# Patient Record
Sex: FEMALE | Race: WHITE | NOT HISPANIC OR LATINO | Employment: STUDENT | URBAN - METROPOLITAN AREA
[De-identification: names, ages, dates, MRNs, and addresses within clinical notes are randomized per-mention and may not be internally consistent; named-entity substitution may affect disease eponyms.]

---
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
## 2022-02-21 ENCOUNTER — TELEPHONE (OUTPATIENT)
Dept: PULMONOLOGY | Facility: CLINIC | Age: 22
End: 2022-02-21

## 2022-02-21 NOTE — TELEPHONE ENCOUNTER
Patient is calling, she said she started using the lower dosage Advair inhaler first to try and see if that would work well for her  She was still struggling so she tried the higher dosage to see if that would help her shortness of breath or asthma attacks  Neither of the doses really helped her and she is still experiencing SOB and asthma attacks  She isn't sure what to do   Please advise

## 2022-02-21 NOTE — TELEPHONE ENCOUNTER
Called patient  Started on prednisone taper    She will call to schedule follow up with Dr Wilton Perea Length To Time In Minutes Device Was In Place: 10

## 2022-03-21 ENCOUNTER — TELEPHONE (OUTPATIENT)
Dept: PULMONOLOGY | Facility: CLINIC | Age: 22
End: 2022-03-21

## 2022-03-22 ENCOUNTER — OFFICE VISIT (OUTPATIENT)
Dept: PULMONOLOGY | Facility: CLINIC | Age: 22
End: 2022-03-22
Payer: COMMERCIAL

## 2022-03-22 VITALS
WEIGHT: 220 LBS | SYSTOLIC BLOOD PRESSURE: 118 MMHG | HEIGHT: 69 IN | DIASTOLIC BLOOD PRESSURE: 76 MMHG | OXYGEN SATURATION: 99 % | BODY MASS INDEX: 32.58 KG/M2 | TEMPERATURE: 97.7 F | RESPIRATION RATE: 18 BRPM | HEART RATE: 106 BPM

## 2022-03-22 DIAGNOSIS — J45.40 MODERATE PERSISTENT ASTHMA, UNSPECIFIED WHETHER COMPLICATED: Primary | ICD-10-CM

## 2022-03-22 DIAGNOSIS — T78.40XD ALLERGY, SUBSEQUENT ENCOUNTER: ICD-10-CM

## 2022-03-22 DIAGNOSIS — J30.89 NON-SEASONAL ALLERGIC RHINITIS, UNSPECIFIED TRIGGER: ICD-10-CM

## 2022-03-22 PROCEDURE — 99214 OFFICE O/P EST MOD 30 MIN: CPT | Performed by: INTERNAL MEDICINE

## 2022-03-22 RX ORDER — BUDESONIDE AND FORMOTEROL FUMARATE DIHYDRATE 160; 4.5 UG/1; UG/1
2 AEROSOL RESPIRATORY (INHALATION) 2 TIMES DAILY
Qty: 10.2 G | Refills: 5 | Status: SHIPPED | OUTPATIENT
Start: 2022-03-22 | End: 2022-03-23

## 2022-03-22 RX ORDER — BUDESONIDE AND FORMOTEROL FUMARATE DIHYDRATE 160; 4.5 UG/1; UG/1
2 AEROSOL RESPIRATORY (INHALATION) 2 TIMES DAILY
Qty: 10.2 G | Refills: 5 | Status: SHIPPED | OUTPATIENT
Start: 2022-03-22 | End: 2022-03-22

## 2022-03-22 RX ORDER — FLUTICASONE PROPIONATE 50 MCG
1 SPRAY, SUSPENSION (ML) NASAL DAILY
Qty: 15.8 ML | Refills: 5 | Status: SHIPPED | OUTPATIENT
Start: 2022-03-22

## 2022-03-22 NOTE — PROGRESS NOTES
Office Progress Note - Pulmonary    Reid Saavedra 25 y o  female MRN: [id number]    Encounter: [id number]      Assessment:   Bronchial asthma   Allergic rhinitis  Plan:     Symbicort 160/4 5, 2 inhalations twice a day   Albuterol rescue inhaler 2 inhalations 4 times a day as needed   Fluticasone nasal spray 2 sprays to each nostril once a day   Benadryl once a day as needed per   Follow-up in 6 months per    Discussion:   The patient has bronchial asthma is better treated with the higher dose of Advair inhaler  Unfortunately her insurance company does not cover Advair and Symbicort is the preferred agent  I have discontinue the Advair and started her on Symbicort 160/4 5, 2 inhalations twice a day  She will use albuterol rescue inhaler 2 inhalations 4 times a day as needed  I have continued her on the fluticasone nasal spray 2 sprays to each nostril once a day  I have renewed her prescription  Because of the asthma and the allergic rhinitis the patient needs to have an air condition in her dorm  I have filled the form to help her moved to a different dorm that is air conditioned  I will see her in 6 months in a follow-up visit  Subjective: The patient is here for a follow-up visit  She tried the lower dose of Advair however she started having more symptoms  She resume the higher dose of the Advair  With that she did not need to use the rescue inhaler  Unfortunately her insurance company does not cover Advair and she has to switch to Symbicort  She denies nocturnal symptoms  Her postnasal dripping is well controlled  She is using fluticasone nasal spray 2 sprays to each nostril once a day  Review of systems:  A 12 point system review is done and aside from what is stated above the rest of the review of systems is negative  Family history and social history are reviewed  Medications list is reviewed        Vitals: Blood pressure 118/76, pulse (!) 106, temperature 97 7 °F (36 5 °C), resp  rate 18, height 5' 9" (1 753 m), weight 99 8 kg (220 lb), SpO2 99 %  ,     Physical Exam  Gen: Awake, alert, oriented x 3, no acute distress  HEENT: Mucous membranes moist, no oral lesions, no thrush  NECK: No accessory muscle use, JVP not elevated  Cardiac: Regular, single S1, single S2, no murmurs, no rubs, no gallops  Lungs:  Clear breath sounds  No wheezing or rhonchi  Abdomen: normoactive bowel sounds, soft nontender, nondistended, no rebound or rigidity, no guarding  Extremities: no cyanosis, no clubbing, no edema  Neuro:  Grossly nonfocal   Skin:  No rash

## 2022-03-23 DIAGNOSIS — J45.40 MODERATE PERSISTENT ASTHMA WITHOUT COMPLICATION: Primary | ICD-10-CM

## 2022-03-23 RX ORDER — BUDESONIDE AND FORMOTEROL FUMARATE DIHYDRATE 160; 4.5 UG/1; UG/1
2 AEROSOL RESPIRATORY (INHALATION) 2 TIMES DAILY
Qty: 10.2 G | Refills: 5 | Status: SHIPPED | OUTPATIENT
Start: 2022-03-23 | End: 2022-06-03 | Stop reason: SDUPTHER

## 2022-03-28 ENCOUNTER — TELEPHONE (OUTPATIENT)
Dept: OTHER | Facility: OTHER | Age: 22
End: 2022-03-28

## 2022-03-28 ENCOUNTER — APPOINTMENT (EMERGENCY)
Dept: RADIOLOGY | Facility: HOSPITAL | Age: 22
End: 2022-03-28
Payer: COMMERCIAL

## 2022-03-28 ENCOUNTER — HOSPITAL ENCOUNTER (EMERGENCY)
Facility: HOSPITAL | Age: 22
Discharge: HOME | End: 2022-03-28
Attending: EMERGENCY MEDICINE
Payer: COMMERCIAL

## 2022-03-28 VITALS
HEART RATE: 76 BPM | RESPIRATION RATE: 17 BRPM | SYSTOLIC BLOOD PRESSURE: 145 MMHG | TEMPERATURE: 98.7 F | OXYGEN SATURATION: 100 % | DIASTOLIC BLOOD PRESSURE: 79 MMHG

## 2022-03-28 DIAGNOSIS — R10.13 EPIGASTRIC PAIN: ICD-10-CM

## 2022-03-28 DIAGNOSIS — R10.9 LEFT SIDED ABDOMINAL PAIN: ICD-10-CM

## 2022-03-28 DIAGNOSIS — R10.9 LEFT FLANK PAIN: Primary | ICD-10-CM

## 2022-03-28 LAB
ALBUMIN SERPL BCP-MCNC: 4 G/DL (ref 3.5–5)
ALP SERPL-CCNC: 91 U/L (ref 46–384)
ALT SERPL W P-5'-P-CCNC: 14 U/L (ref 12–78)
ANION GAP SERPL CALCULATED.3IONS-SCNC: 3 MMOL/L (ref 4–13)
AST SERPL W P-5'-P-CCNC: 12 U/L (ref 5–45)
BASOPHILS # BLD AUTO: 0.07 THOUSANDS/ΜL (ref 0–0.1)
BASOPHILS NFR BLD AUTO: 1 % (ref 0–1)
BILIRUB SERPL-MCNC: 0.24 MG/DL (ref 0.2–1)
BILIRUB UR QL STRIP: NEGATIVE
BUN SERPL-MCNC: 8 MG/DL (ref 5–25)
CALCIUM SERPL-MCNC: 9.6 MG/DL (ref 8.3–10.1)
CHLORIDE SERPL-SCNC: 108 MMOL/L (ref 100–108)
CLARITY UR: CLEAR
CO2 SERPL-SCNC: 29 MMOL/L (ref 21–32)
COLOR UR: YELLOW
CREAT SERPL-MCNC: 0.64 MG/DL (ref 0.6–1.3)
EOSINOPHIL # BLD AUTO: 0.28 THOUSAND/ΜL (ref 0–0.61)
EOSINOPHIL NFR BLD AUTO: 3 % (ref 0–6)
ERYTHROCYTE [DISTWIDTH] IN BLOOD BY AUTOMATED COUNT: 12.7 % (ref 11.6–15.1)
EXT PREG TEST URINE: NEGATIVE
EXT. CONTROL ED NAV: NORMAL
GFR SERPL CREATININE-BSD FRML MDRD: 129 ML/MIN/1.73SQ M
GLUCOSE SERPL-MCNC: 96 MG/DL (ref 65–140)
GLUCOSE UR STRIP-MCNC: NEGATIVE MG/DL
HCT VFR BLD AUTO: 40.9 % (ref 34.8–46.1)
HGB BLD-MCNC: 13.2 G/DL (ref 11.5–15.4)
HGB UR QL STRIP.AUTO: NEGATIVE
IMM GRANULOCYTES # BLD AUTO: 0.06 THOUSAND/UL (ref 0–0.2)
IMM GRANULOCYTES NFR BLD AUTO: 1 % (ref 0–2)
KETONES UR STRIP-MCNC: NEGATIVE MG/DL
LEUKOCYTE ESTERASE UR QL STRIP: NEGATIVE
LIPASE SERPL-CCNC: 117 U/L (ref 73–393)
LYMPHOCYTES # BLD AUTO: 1.9 THOUSANDS/ΜL (ref 0.6–4.47)
LYMPHOCYTES NFR BLD AUTO: 19 % (ref 14–44)
MCH RBC QN AUTO: 29.5 PG (ref 26.8–34.3)
MCHC RBC AUTO-ENTMCNC: 32.3 G/DL (ref 31.4–37.4)
MCV RBC AUTO: 91 FL (ref 82–98)
MONOCYTES # BLD AUTO: 0.61 THOUSAND/ΜL (ref 0.17–1.22)
MONOCYTES NFR BLD AUTO: 6 % (ref 4–12)
NEUTROPHILS # BLD AUTO: 7.06 THOUSANDS/ΜL (ref 1.85–7.62)
NEUTS SEG NFR BLD AUTO: 70 % (ref 43–75)
NITRITE UR QL STRIP: NEGATIVE
NRBC BLD AUTO-RTO: 0 /100 WBCS
PH UR STRIP.AUTO: 7.5 [PH] (ref 4.5–8)
PLATELET # BLD AUTO: 352 THOUSANDS/UL (ref 149–390)
PMV BLD AUTO: 10.3 FL (ref 8.9–12.7)
POTASSIUM SERPL-SCNC: 3.8 MMOL/L (ref 3.5–5.3)
PROT SERPL-MCNC: 7.8 G/DL (ref 6.4–8.2)
PROT UR STRIP-MCNC: NEGATIVE MG/DL
RBC # BLD AUTO: 4.48 MILLION/UL (ref 3.81–5.12)
SODIUM SERPL-SCNC: 140 MMOL/L (ref 136–145)
SP GR UR STRIP.AUTO: 1.02 (ref 1–1.03)
UROBILINOGEN UR QL STRIP.AUTO: 0.2 E.U./DL
WBC # BLD AUTO: 9.98 THOUSAND/UL (ref 4.31–10.16)

## 2022-03-28 PROCEDURE — 81025 URINE PREGNANCY TEST: CPT | Performed by: PHYSICIAN ASSISTANT

## 2022-03-28 PROCEDURE — 81003 URINALYSIS AUTO W/O SCOPE: CPT

## 2022-03-28 PROCEDURE — 99285 EMERGENCY DEPT VISIT HI MDM: CPT | Performed by: PHYSICIAN ASSISTANT

## 2022-03-28 PROCEDURE — 96374 THER/PROPH/DIAG INJ IV PUSH: CPT

## 2022-03-28 PROCEDURE — 85025 COMPLETE CBC W/AUTO DIFF WBC: CPT | Performed by: PHYSICIAN ASSISTANT

## 2022-03-28 PROCEDURE — 36415 COLL VENOUS BLD VENIPUNCTURE: CPT | Performed by: PHYSICIAN ASSISTANT

## 2022-03-28 PROCEDURE — 83690 ASSAY OF LIPASE: CPT | Performed by: PHYSICIAN ASSISTANT

## 2022-03-28 PROCEDURE — 96376 TX/PRO/DX INJ SAME DRUG ADON: CPT

## 2022-03-28 PROCEDURE — 76856 US EXAM PELVIC COMPLETE: CPT

## 2022-03-28 PROCEDURE — 96361 HYDRATE IV INFUSION ADD-ON: CPT

## 2022-03-28 PROCEDURE — 80053 COMPREHEN METABOLIC PANEL: CPT | Performed by: PHYSICIAN ASSISTANT

## 2022-03-28 PROCEDURE — G1004 CDSM NDSC: HCPCS

## 2022-03-28 PROCEDURE — 99284 EMERGENCY DEPT VISIT MOD MDM: CPT

## 2022-03-28 PROCEDURE — 76830 TRANSVAGINAL US NON-OB: CPT

## 2022-03-28 PROCEDURE — 96375 TX/PRO/DX INJ NEW DRUG ADDON: CPT

## 2022-03-28 PROCEDURE — 74177 CT ABD & PELVIS W/CONTRAST: CPT

## 2022-03-28 RX ORDER — KETOROLAC TROMETHAMINE 30 MG/ML
15 INJECTION, SOLUTION INTRAMUSCULAR; INTRAVENOUS ONCE
Status: COMPLETED | OUTPATIENT
Start: 2022-03-28 | End: 2022-03-28

## 2022-03-28 RX ORDER — ONDANSETRON 2 MG/ML
4 INJECTION INTRAMUSCULAR; INTRAVENOUS ONCE
Status: COMPLETED | OUTPATIENT
Start: 2022-03-28 | End: 2022-03-28

## 2022-03-28 RX ADMIN — ONDANSETRON 4 MG: 2 INJECTION INTRAMUSCULAR; INTRAVENOUS at 16:49

## 2022-03-28 RX ADMIN — ONDANSETRON 4 MG: 2 INJECTION INTRAMUSCULAR; INTRAVENOUS at 15:08

## 2022-03-28 RX ADMIN — SODIUM CHLORIDE 1000 ML: 0.9 INJECTION, SOLUTION INTRAVENOUS at 15:08

## 2022-03-28 RX ADMIN — IOHEXOL 100 ML: 350 INJECTION, SOLUTION INTRAVENOUS at 16:59

## 2022-03-28 RX ADMIN — MORPHINE SULFATE 2 MG: 2 INJECTION, SOLUTION INTRAMUSCULAR; INTRAVENOUS at 16:49

## 2022-03-28 RX ADMIN — KETOROLAC TROMETHAMINE 15 MG: 30 INJECTION, SOLUTION INTRAMUSCULAR at 15:08

## 2022-03-28 NOTE — Clinical Note
Esvin Grya was seen and treated in our emergency department on 3/28/2022  Diagnosis:     Denisse    She may return on this date:     Excused on 3/28     If you have any questions or concerns, please don't hesitate to call        Tyrone Florez MD    ______________________________           _______________          _______________  Hospital Representative                              Date                                Time

## 2022-03-28 NOTE — DISCHARGE INSTRUCTIONS
Please follow all return precautions  6 5 x 4 7 x 6 5 cm hemorrhagic left ovarian cyst (O-RADS 2  Based on its size and appearance, by ACR O-RADS criteria, gynecologic referral is suggested)

## 2022-03-28 NOTE — Clinical Note
Elizabeth Chun was seen and treated in our emergency department on 3/28/2022  Diagnosis:     Denisse    She may return on this date:     Excused on 3/28     If you have any questions or concerns, please don't hesitate to call        Chuy Suarez MD    ______________________________           _______________          _______________  Hospital Representative                              Date                                Time

## 2022-03-28 NOTE — ED PROVIDER NOTES
History  Chief Complaint   Patient presents with    Flank Pain     Pt c/o L sided flank pain radiating into groin/abdomen since today  Pt has hx of kidney stones      Patient presents to the ER for evaluation of left flank pain  The patient states that today she began with pain that began in her left flank and radiates around to her back  States that the pain is worse with walking  Patient notes associated nausea and urinary frequency  Patient denies taking any medication for PTA  Patient states that she has a history of kidney stones and this feels similar  Patient states she has a history of an appendectomy  Denies any fevers, dizziness, chest pain, difficulty breathing, vomiting, diarrhea, dysuria, or any other concerning symptoms  Prior to Admission Medications   Prescriptions Last Dose Informant Patient Reported? Taking? Multiple Vitamin (Multi-Day) TABS  Self Yes No   Sig: Take 1 tablet by mouth   Probiotic Product (Misc Intestinal Vikki Regulat) CAPS  Self Yes No   Sig: Take by mouth   Patient not taking: Reported on 2021   Symbicort 160-4 5 MCG/ACT inhaler   No No   Sig: Inhale 2 puffs 2 (two) times a day Rinse mouth after use     albuterol (PROVENTIL HFA,VENTOLIN HFA) 90 mcg/act inhaler  Self Yes No   ascorbic acid (VITAMIN C) 500 MG tablet  Self Yes No   Sig: Take 500 mg by mouth   doxycycline hyclate (VIBRAMYCIN) 100 mg capsule  Self Yes No   Patient not taking: Reported on 2021   fluticasone (FLONASE) 50 mcg/act nasal spray   No No   Si spray into each nostril daily   ibuprofen (MOTRIN) 800 mg tablet   No No   Sig: Take 1 tablet (800 mg total) by mouth every 8 (eight) hours for 5 days   morphine (MSIR) 15 mg tablet  Self No No   Sig: Take 0 5 tablets (7 5 mg total) by mouth every 4 (four) hours as needed for severe painMax Daily Amount: 45 mg   Patient not taking: Reported on 2021   ondansetron (ZOFRAN) 4 mg tablet  Self No No   Sig: Take 1 tablet (4 mg total) by mouth every 6 (six) hours   Patient not taking: Reported on 10/7/2021   predniSONE 10 mg tablet  Self No No   Sig: Take 4 tablets (40 mg total) by mouth daily 40 mg x 3 days, 30 mg x 3 days, 20 mg x 3 days, 10 mg x 3 days   Patient not taking: Reported on 3/22/2022    predniSONE 20 mg tablet  Self Yes No   Patient not taking: Reported on 9/22/2021      Facility-Administered Medications: None       Past Medical History:   Diagnosis Date    Exercise-induced asthma        Past Surgical History:   Procedure Laterality Date    APPENDECTOMY         History reviewed  No pertinent family history  I have reviewed and agree with the history as documented  E-Cigarette/Vaping    E-Cigarette Use Never User      E-Cigarette/Vaping Substances    Nicotine No     THC No     CBD No     Flavoring No     Other No     Unknown No      Social History     Tobacco Use    Smoking status: Never Smoker    Smokeless tobacco: Never Used   Vaping Use    Vaping Use: Never used   Substance Use Topics    Alcohol use: Yes     Comment: social     Drug use: Never       Review of Systems   Constitutional: Negative for fever  HENT: Negative for congestion, rhinorrhea and sore throat  Respiratory: Negative for shortness of breath  Cardiovascular: Negative for chest pain  Gastrointestinal: Positive for abdominal pain and nausea  Negative for diarrhea and vomiting  Genitourinary: Positive for flank pain and frequency  Negative for dysuria  Musculoskeletal: Negative for back pain and neck pain  Skin: Negative for rash  Neurological: Negative for weakness, numbness and headaches  All other systems reviewed and are negative  Physical Exam  Physical Exam  Constitutional:       Appearance: She is well-developed  HENT:      Head: Normocephalic and atraumatic  Nose: Nose normal    Eyes:      Conjunctiva/sclera: Conjunctivae normal    Cardiovascular:      Rate and Rhythm: Normal rate     Pulmonary:      Effort: Pulmonary effort is normal    Abdominal:      Palpations: Abdomen is soft  Tenderness: There is abdominal tenderness  There is no guarding  Comments: TTP of Left flank, LLQ, LUQ and epigastric area   Musculoskeletal:         General: Normal range of motion  Cervical back: Normal range of motion  Skin:     General: Skin is warm  Capillary Refill: Capillary refill takes less than 2 seconds  Neurological:      Mental Status: She is alert and oriented to person, place, and time           Vital Signs  ED Triage Vitals [03/28/22 1436]   Temperature Pulse Respirations Blood Pressure SpO2   98 7 °F (37 1 °C) 76 17 145/79 100 %      Temp Source Heart Rate Source Patient Position - Orthostatic VS BP Location FiO2 (%)   Oral Monitor Lying Right arm --      Pain Score       7           Vitals:    03/28/22 1436   BP: 145/79   Pulse: 76   Patient Position - Orthostatic VS: Lying         Visual Acuity      ED Medications  Medications   sodium chloride 0 9 % bolus 1,000 mL (0 mL Intravenous Stopped 3/28/22 1634)   ondansetron (ZOFRAN) injection 4 mg (4 mg Intravenous Given 3/28/22 1508)   ketorolac (TORADOL) injection 15 mg (15 mg Intravenous Given 3/28/22 1508)   morphine injection 2 mg (2 mg Intravenous Given 3/28/22 1649)   ondansetron (ZOFRAN) injection 4 mg (4 mg Intravenous Given 3/28/22 1649)   iohexol (OMNIPAQUE) 350 MG/ML injection (MULTI-DOSE) 100 mL (100 mL Intravenous Given 3/28/22 1659)       Diagnostic Studies  Results Reviewed     Procedure Component Value Units Date/Time    Lipase [[id number]]  (Normal) Collected: 03/28/22 1507    Lab Status: Final result Specimen: Blood from Arm, Right Updated: 03/28/22 1559     Lipase 117 u/L     Comprehensive metabolic panel [[id number]]  (Abnormal) Collected: 03/28/22 1507    Lab Status: Final result Specimen: Blood from Arm, Right Updated: 03/28/22 1559     Sodium 140 mmol/L      Potassium 3 8 mmol/L      Chloride 108 mmol/L      CO2 29 mmol/L      ANION GAP 3 mmol/L BUN 8 mg/dL      Creatinine 0 64 mg/dL      Glucose 96 mg/dL      Calcium 9 6 mg/dL      AST 12 U/L      ALT 14 U/L      Alkaline Phosphatase 91 U/L      Total Protein 7 8 g/dL      Albumin 4 0 g/dL      Total Bilirubin 0 24 mg/dL      eGFR 129 ml/min/1 73sq m     Narrative:      National Kidney Disease Foundation guidelines for Chronic Kidney Disease (CKD):     Stage 1 with normal or high GFR (GFR > 90 mL/min/1 73 square meters)    Stage 2 Mild CKD (GFR = 60-89 mL/min/1 73 square meters)    Stage 3A Moderate CKD (GFR = 45-59 mL/min/1 73 square meters)    Stage 3B Moderate CKD (GFR = 30-44 mL/min/1 73 square meters)    Stage 4 Severe CKD (GFR = 15-29 mL/min/1 73 square meters)    Stage 5 End Stage CKD (GFR <15 mL/min/1 73 square meters)  Note: GFR calculation is accurate only with a steady state creatinine    POCT pregnancy, urine [[id number]]  (Normal) Resulted: 03/28/22 1544    Lab Status: Final result Updated: 03/28/22 1544     EXT PREG TEST UR (Ref: Negative) negative     Control valid    CBC and differential [[id number]] Collected: 03/28/22 1507    Lab Status: Final result Specimen: Blood from Arm, Right Updated: 03/28/22 1525     WBC 9 98 Thousand/uL      RBC 4 48 Million/uL      Hemoglobin 13 2 g/dL      Hematocrit 40 9 %      MCV 91 fL      MCH 29 5 pg      MCHC 32 3 g/dL      RDW 12 7 %      MPV 10 3 fL      Platelets 442 Thousands/uL      nRBC 0 /100 WBCs      Neutrophils Relative 70 %      Immat GRANS % 1 %      Lymphocytes Relative 19 %      Monocytes Relative 6 %      Eosinophils Relative 3 %      Basophils Relative 1 %      Neutrophils Absolute 7 06 Thousands/µL      Immature Grans Absolute 0 06 Thousand/uL      Lymphocytes Absolute 1 90 Thousands/µL      Monocytes Absolute 0 61 Thousand/µL      Eosinophils Absolute 0 28 Thousand/µL      Basophils Absolute 0 07 Thousands/µL     Urine Macroscopic, POC [[id number]] Collected: 03/28/22 1514    Lab Status: Final result Specimen: Urine Updated: 03/28/22 1515     Color, UA Yellow     Clarity, UA Clear     pH, UA 7 5     Leukocytes, UA Negative     Nitrite, UA Negative     Protein, UA Negative mg/dl      Glucose, UA Negative mg/dl      Ketones, UA Negative mg/dl      Urobilinogen, UA 0 2 E U /dl      Bilirubin, UA Negative     Blood, UA Negative     Specific Gravity, UA 1 020    Narrative:      CLINITEK RESULT                 US pelvis complete w transvaginal   Final Result by Derrick Medeiros MD (03/28 1856)       6 5 x 4 7 x 6 5 cm hemorrhagic left ovarian cyst (O-RADS 2  Based on its size and appearance, by ACR O-RADS criteria, gynecologic referral is suggested)  No evidence of adnexal torsion  Small amount of free fluid in the pelvis  Otherwise normal pelvic sonogram                       Workstation performed: [id number]         CT abdomen pelvis with contrast   Final Result by Radha Jo MD (03/28 1733)      Incidental 6 8 cm left adnexal cyst   According to current guidelines Aracelis Goo Am Nnamdi Radiol 2020; 30:649-016) in this premenopausal woman, this should be followed up in 6 to 12 months by pelvic ultrasound  The study was marked in [facility] for immediate notification  Workstation performed: [id number]                    Procedures  Procedures         ED Course  ED Course as of 03/29/22 0722   Mon Mar 28, 2022   1446 Blood Pressure: 145/79   1446 Temperature: 98 7 °F (37 1 °C)   1446 Pulse: 76   1446 Respirations: 17   1446 SpO2: 100 %   1516 Leukocytes, UA: Negative   1516 Nitrite, UA: Negative   1516 Blood, UA: Negative   1529 WBC: 9 98   1529 Hemoglobin: 13 2   1559 Creatinine: 0 64   1600 PREGNANCY TEST URINE: negative         CRAFFT      Most Recent Value   SBIRT (13-21 yo)    In order to provide better care to our patients, we are screening all of our patients for alcohol and drug use  Would it be okay to ask you these screening questions?  No Filed at: 03/28/2022 1523                                          MDM Patient well appearing, no acute distress in the ER  Patient signed out to Dr Moni Zimmer, awaiting CT scan results and dispo  Disposition  Final diagnoses:   Left flank pain   Left sided abdominal pain   Epigastric pain     Time reflects when diagnosis was documented in both MDM as applicable and the Disposition within this note     Time User Action Codes Description Comment    3/28/2022  5:39 PM Blinda Duplin Add [R10 9] Left flank pain     3/28/2022  5:39 PM Blinda Duplin Add [R10 9] Left sided abdominal pain     3/28/2022  5:40 PM Blinda Duplin Add [R10 13] Epigastric pain       ED Disposition     ED Disposition Condition Date/Time Comment    Discharge Stable Mon Mar 28, 2022  5:39 PM Ayla Mooreumblani discharge to home/self care              Follow-up Information     Follow up With Specialties Details Why Contact Info Additional Information    Marian Tsai MD Pediatrics Call   [address] [facility]   [address]  [facility] [address] [place] [address], [facility]  Obstetrics and Gynecology Call   [id number] [place] [address], [address] [place] [address], [address] [place] [address], [id number] [phone]          Discharge Medication List as of 3/28/2022  7:15 PM      CONTINUE these medications which have NOT CHANGED    Details   albuterol (PROVENTIL HFA,VENTOLIN HFA) 90 mcg/act inhaler Starting Fri 7/23/2021, Historical Med      ascorbic acid (VITAMIN C) 500 MG tablet Take 500 mg by mouth, Historical Med      doxycycline hyclate (VIBRAMYCIN) 100 mg capsule Starting u 6/24/2021, Historical Med      fluticasone (FLONASE) 50 mcg/act nasal spray 1 spray into each nostril daily, Starting Tue 3/22/2022, Normal      ibuprofen (MOTRIN) 800 mg tablet Take 1 tablet (800 mg total) by mouth every 8 (eight) hours for 5 days, Starting Tue 5/18/2021, Until Sun 5/23/2021, Normal morphine (MSIR) 15 mg tablet Take 0 5 tablets (7 5 mg total) by mouth every 4 (four) hours as needed for severe painMax Daily Amount: 45 mg, Starting Sun 4/18/2021, Normal      Multiple Vitamin (Multi-Day) TABS Take 1 tablet by mouth, Historical Med      ondansetron (ZOFRAN) 4 mg tablet Take 1 tablet (4 mg total) by mouth every 6 (six) hours, Starting Sun 4/18/2021, Normal      !! predniSONE 10 mg tablet Take 4 tablets (40 mg total) by mouth daily 40 mg x 3 days, 30 mg x 3 days, 20 mg x 3 days, 10 mg x 3 days, Starting Mon 2/21/2022, Normal      !! predniSONE 20 mg tablet Starting Fri 7/23/2021, Historical Med      Probiotic Product (Misc Intestinal Vikki Regulat) CAPS Take by mouth, Historical Med      Symbicort 160-4 5 MCG/ACT inhaler Inhale 2 puffs 2 (two) times a day Rinse mouth after use , Starting Wed 3/23/2022, Normal       !! - Potential duplicate medications found  Please discuss with provider  No discharge procedures on file      PDMP Review     None          ED Provider  Electronically Signed by           Oscar Avila PA-C  03/29/22 1108

## 2022-03-28 NOTE — Clinical Note
Naeem Kan was seen and treated in our emergency department on 3/28/2022  Diagnosis:     Denisse    She may return on this date:     Excused on 3/28     If you have any questions or concerns, please don't hesitate to call        Marlena Mccoy MD    ______________________________           _______________          _______________  Hospital Representative                              Date                                Time

## 2022-03-28 NOTE — TELEPHONE ENCOUNTER
[employer]  She is going to be sending her over to the hospital for flank pain and possible kidney stones  She just wanted her urologist to be aware  If you need to speak with her della call her back at the number listed

## 2022-03-31 ENCOUNTER — HOSPITAL ENCOUNTER (EMERGENCY)
Facility: HOSPITAL | Age: 22
Discharge: HOME | End: 2022-03-31
Attending: EMERGENCY MEDICINE
Payer: COMMERCIAL

## 2022-03-31 VITALS
OXYGEN SATURATION: 99 % | BODY MASS INDEX: 34.5 KG/M2 | TEMPERATURE: 98.3 F | HEART RATE: 95 BPM | DIASTOLIC BLOOD PRESSURE: 79 MMHG | WEIGHT: 233.6 LBS | RESPIRATION RATE: 18 BRPM | SYSTOLIC BLOOD PRESSURE: 141 MMHG

## 2022-03-31 DIAGNOSIS — R10.9 ABDOMINAL PAIN: Primary | ICD-10-CM

## 2022-03-31 DIAGNOSIS — R11.0 NAUSEA: ICD-10-CM

## 2022-03-31 DIAGNOSIS — R19.7 DIARRHEA: ICD-10-CM

## 2022-03-31 DIAGNOSIS — N83.209 OVARIAN CYST: ICD-10-CM

## 2022-03-31 LAB
ALBUMIN SERPL BCP-MCNC: 3.6 G/DL (ref 3.5–5)
ALP SERPL-CCNC: 81 U/L (ref 46–384)
ALT SERPL W P-5'-P-CCNC: 13 U/L (ref 12–78)
ANION GAP SERPL CALCULATED.3IONS-SCNC: 4 MMOL/L (ref 4–13)
AST SERPL W P-5'-P-CCNC: 14 U/L (ref 5–45)
BASOPHILS # BLD AUTO: 0.08 THOUSANDS/ΜL (ref 0–0.1)
BASOPHILS NFR BLD AUTO: 1 % (ref 0–1)
BILIRUB SERPL-MCNC: 0.23 MG/DL (ref 0.2–1)
BILIRUB UR QL STRIP: NEGATIVE
BUN SERPL-MCNC: 9 MG/DL (ref 5–25)
CALCIUM SERPL-MCNC: 9.2 MG/DL (ref 8.3–10.1)
CHLORIDE SERPL-SCNC: 108 MMOL/L (ref 100–108)
CLARITY UR: CLEAR
CO2 SERPL-SCNC: 28 MMOL/L (ref 21–32)
COLOR UR: NORMAL
CREAT SERPL-MCNC: 0.69 MG/DL (ref 0.6–1.3)
EOSINOPHIL # BLD AUTO: 0.3 THOUSAND/ΜL (ref 0–0.61)
EOSINOPHIL NFR BLD AUTO: 3 % (ref 0–6)
ERYTHROCYTE [DISTWIDTH] IN BLOOD BY AUTOMATED COUNT: 12.4 % (ref 11.6–15.1)
EXT PREG TEST URINE: NEGATIVE
EXT. CONTROL ED NAV: NORMAL
GFR SERPL CREATININE-BSD FRML MDRD: 126 ML/MIN/1.73SQ M
GLUCOSE SERPL-MCNC: 74 MG/DL (ref 65–140)
GLUCOSE UR STRIP-MCNC: NEGATIVE MG/DL
HCT VFR BLD AUTO: 41.5 % (ref 34.8–46.1)
HGB BLD-MCNC: 13.7 G/DL (ref 11.5–15.4)
HGB UR QL STRIP.AUTO: NEGATIVE
IMM GRANULOCYTES # BLD AUTO: 0.05 THOUSAND/UL (ref 0–0.2)
IMM GRANULOCYTES NFR BLD AUTO: 1 % (ref 0–2)
KETONES UR STRIP-MCNC: NEGATIVE MG/DL
LEUKOCYTE ESTERASE UR QL STRIP: NEGATIVE
LIPASE SERPL-CCNC: 106 U/L (ref 73–393)
LYMPHOCYTES # BLD AUTO: 1.74 THOUSANDS/ΜL (ref 0.6–4.47)
LYMPHOCYTES NFR BLD AUTO: 16 % (ref 14–44)
MCH RBC QN AUTO: 30.2 PG (ref 26.8–34.3)
MCHC RBC AUTO-ENTMCNC: 33 G/DL (ref 31.4–37.4)
MCV RBC AUTO: 92 FL (ref 82–98)
MONOCYTES # BLD AUTO: 0.79 THOUSAND/ΜL (ref 0.17–1.22)
MONOCYTES NFR BLD AUTO: 7 % (ref 4–12)
NEUTROPHILS # BLD AUTO: 7.83 THOUSANDS/ΜL (ref 1.85–7.62)
NEUTS SEG NFR BLD AUTO: 72 % (ref 43–75)
NITRITE UR QL STRIP: NEGATIVE
NRBC BLD AUTO-RTO: 0 /100 WBCS
PH UR STRIP.AUTO: 7.5 [PH]
PLATELET # BLD AUTO: 366 THOUSANDS/UL (ref 149–390)
PMV BLD AUTO: 10.2 FL (ref 8.9–12.7)
POTASSIUM SERPL-SCNC: 3.5 MMOL/L (ref 3.5–5.3)
PROT SERPL-MCNC: 7.3 G/DL (ref 6.4–8.2)
PROT UR STRIP-MCNC: NEGATIVE MG/DL
RBC # BLD AUTO: 4.53 MILLION/UL (ref 3.81–5.12)
SODIUM SERPL-SCNC: 140 MMOL/L (ref 136–145)
SP GR UR STRIP.AUTO: 1.01 (ref 1–1.03)
UROBILINOGEN UR STRIP-ACNC: <2 MG/DL
WBC # BLD AUTO: 10.79 THOUSAND/UL (ref 4.31–10.16)

## 2022-03-31 PROCEDURE — 85025 COMPLETE CBC W/AUTO DIFF WBC: CPT | Performed by: PHYSICIAN ASSISTANT

## 2022-03-31 PROCEDURE — 99284 EMERGENCY DEPT VISIT MOD MDM: CPT | Performed by: PHYSICIAN ASSISTANT

## 2022-03-31 PROCEDURE — 96361 HYDRATE IV INFUSION ADD-ON: CPT

## 2022-03-31 PROCEDURE — 83690 ASSAY OF LIPASE: CPT | Performed by: PHYSICIAN ASSISTANT

## 2022-03-31 PROCEDURE — 99284 EMERGENCY DEPT VISIT MOD MDM: CPT

## 2022-03-31 PROCEDURE — 80053 COMPREHEN METABOLIC PANEL: CPT | Performed by: PHYSICIAN ASSISTANT

## 2022-03-31 PROCEDURE — 81025 URINE PREGNANCY TEST: CPT | Performed by: PHYSICIAN ASSISTANT

## 2022-03-31 PROCEDURE — 96375 TX/PRO/DX INJ NEW DRUG ADDON: CPT

## 2022-03-31 PROCEDURE — 96374 THER/PROPH/DIAG INJ IV PUSH: CPT

## 2022-03-31 PROCEDURE — 81003 URINALYSIS AUTO W/O SCOPE: CPT | Performed by: PHYSICIAN ASSISTANT

## 2022-03-31 PROCEDURE — 36415 COLL VENOUS BLD VENIPUNCTURE: CPT | Performed by: PHYSICIAN ASSISTANT

## 2022-03-31 RX ORDER — KETOROLAC TROMETHAMINE 30 MG/ML
15 INJECTION, SOLUTION INTRAMUSCULAR; INTRAVENOUS ONCE
Status: COMPLETED | OUTPATIENT
Start: 2022-03-31 | End: 2022-03-31

## 2022-03-31 RX ORDER — ONDANSETRON 2 MG/ML
4 INJECTION INTRAMUSCULAR; INTRAVENOUS ONCE
Status: COMPLETED | OUTPATIENT
Start: 2022-03-31 | End: 2022-03-31

## 2022-03-31 RX ORDER — ONDANSETRON 4 MG/1
4 TABLET, ORALLY DISINTEGRATING ORAL EVERY 6 HOURS PRN
Qty: 10 TABLET | Refills: 0 | Status: SHIPPED | OUTPATIENT
Start: 2022-03-31 | End: 2022-07-06 | Stop reason: ALTCHOICE

## 2022-03-31 RX ORDER — DICYCLOMINE HCL 20 MG
20 TABLET ORAL 2 TIMES DAILY
Qty: 6 TABLET | Refills: 0 | Status: SHIPPED | OUTPATIENT
Start: 2022-03-31 | End: 2022-07-06 | Stop reason: ALTCHOICE

## 2022-03-31 RX ORDER — DICYCLOMINE HCL 20 MG
20 TABLET ORAL ONCE
Status: COMPLETED | OUTPATIENT
Start: 2022-03-31 | End: 2022-03-31

## 2022-03-31 RX ADMIN — SODIUM CHLORIDE 1000 ML: 0.9 INJECTION, SOLUTION INTRAVENOUS at 12:40

## 2022-03-31 RX ADMIN — KETOROLAC TROMETHAMINE 15 MG: 30 INJECTION, SOLUTION INTRAMUSCULAR at 12:51

## 2022-03-31 RX ADMIN — ONDANSETRON 4 MG: 2 INJECTION INTRAMUSCULAR; INTRAVENOUS at 12:39

## 2022-03-31 RX ADMIN — DICYCLOMINE HYDROCHLORIDE 20 MG: 20 TABLET ORAL at 12:40

## 2022-03-31 NOTE — Clinical Note
Delfina Victor was seen and treated in our emergency department on 3/31/2022  Diagnosis: abdominal pain    Denisse    She may return on this date: 04/04/2022         If you have any questions or concerns, please don't hesitate to call        Kevan Knapp PA-C    ______________________________           _______________          _______________  Hospital Representative                              Date                                Time

## 2022-03-31 NOTE — ED PROVIDER NOTES
History  Chief Complaint   Patient presents with    Abdominal Pain     Pt seen monday for the same diagnosed with ovarian cysts, c/o increasing pain     This is a 82-year-old female patient who was seen here 4 days ago  At that time she had left lower quadrant pain had a CT scan was found have a 6 cm left incidental ovarian cyst   An ultrasound was then performed there was no torsion and the cyst was once again seen  Was taken Motrin every 6 hours and was helping now has developed diarrhea that is nonbloody she now has pain more over left upper sometimes in her lower  Sometimes in the right upper  She is currently nauseous  States she is not eating as normal   But she is nontoxic in no acute distress denies any fever chills headache blurred vision double vision cough congestion sore throat no chest pain or shortness of breath  No urgency frequency dysuria  Differential diagnosis not limited to viral gastroenteritis, cholecystitis less likely, gastritis less likely, pancreatitis  Ongoing cyst discomfort however not as intense as previous  Patient is under lot of stress from school could be also irritable bowel  Based on the factors no real pain to palpation no guarding or rebound and she just had a CT scan ultrasound will not reimage  I will recheck blood work give Bentyl Zofran fluids and re-evaluate  Prior to Admission Medications   Prescriptions Last Dose Informant Patient Reported? Taking? Multiple Vitamin (Multi-Day) TABS  Self Yes No   Sig: Take 1 tablet by mouth   Probiotic Product (Misc Intestinal Vikki Regulat) CAPS  Self Yes No   Sig: Take by mouth   Patient not taking: Reported on 9/22/2021   Symbicort 160-4 5 MCG/ACT inhaler   No No   Sig: Inhale 2 puffs 2 (two) times a day Rinse mouth after use     albuterol (PROVENTIL HFA,VENTOLIN HFA) 90 mcg/act inhaler  Self Yes No   ascorbic acid (VITAMIN C) 500 MG tablet  Self Yes No   Sig: Take 500 mg by mouth   doxycycline hyclate (VIBRAMYCIN) 100 mg capsule  Self Yes No   Patient not taking: Reported on 2021   fluticasone (FLONASE) 50 mcg/act nasal spray   No No   Si spray into each nostril daily   ibuprofen (MOTRIN) 800 mg tablet   No No   Sig: Take 1 tablet (800 mg total) by mouth every 8 (eight) hours for 5 days   morphine (MSIR) 15 mg tablet  Self No No   Sig: Take 0 5 tablets (7 5 mg total) by mouth every 4 (four) hours as needed for severe painMax Daily Amount: 45 mg   Patient not taking: Reported on 2021   ondansetron (ZOFRAN) 4 mg tablet  Self No No   Sig: Take 1 tablet (4 mg total) by mouth every 6 (six) hours   Patient not taking: Reported on 10/7/2021   predniSONE 10 mg tablet  Self No No   Sig: Take 4 tablets (40 mg total) by mouth daily 40 mg x 3 days, 30 mg x 3 days, 20 mg x 3 days, 10 mg x 3 days   Patient not taking: Reported on 3/22/2022    predniSONE 20 mg tablet  Self Yes No   Patient not taking: Reported on 2021      Facility-Administered Medications: None       Past Medical History:   Diagnosis Date    Exercise-induced asthma        Past Surgical History:   Procedure Laterality Date    APPENDECTOMY         History reviewed  No pertinent family history  I have reviewed and agree with the history as documented  E-Cigarette/Vaping    E-Cigarette Use Never User      E-Cigarette/Vaping Substances    Nicotine No     THC No     CBD No     Flavoring No     Other No     Unknown No      Social History     Tobacco Use    Smoking status: Never Smoker    Smokeless tobacco: Never Used   Vaping Use    Vaping Use: Never used   Substance Use Topics    Alcohol use: Yes     Comment: social     Drug use: Never       Review of Systems   Constitutional: Negative for diaphoresis, fatigue and fever  HENT: Negative for congestion, ear pain, nosebleeds and sore throat  Eyes: Negative for photophobia, pain, discharge and visual disturbance     Respiratory: Negative for cough, choking, chest tightness, shortness of breath and wheezing  Cardiovascular: Negative for chest pain and palpitations  Gastrointestinal: Positive for abdominal distention, diarrhea and nausea  Negative for abdominal pain, anal bleeding, blood in stool, constipation, rectal pain and vomiting  Genitourinary: Negative for dysuria, flank pain and frequency  Musculoskeletal: Negative for back pain, gait problem and joint swelling  Skin: Negative for color change and rash  Neurological: Negative for dizziness, syncope and headaches  Psychiatric/Behavioral: Negative for behavioral problems and confusion  The patient is not nervous/anxious  All other systems reviewed and are negative  Physical Exam  Physical Exam  Vitals and nursing note reviewed  Constitutional:       General: She is not in acute distress  Appearance: Normal appearance  She is well-developed  She is not ill-appearing, toxic-appearing or diaphoretic  HENT:      Head: Normocephalic and atraumatic  Right Ear: Tympanic membrane, ear canal and external ear normal       Left Ear: Tympanic membrane, ear canal and external ear normal       Nose: Nose normal  No congestion or rhinorrhea  Mouth/Throat:      Mouth: Mucous membranes are moist       Pharynx: Oropharynx is clear  No oropharyngeal exudate or posterior oropharyngeal erythema  Eyes:      Extraocular Movements: Extraocular movements intact  Conjunctiva/sclera: Conjunctivae normal       Pupils: Pupils are equal, round, and reactive to light  Cardiovascular:      Rate and Rhythm: Normal rate and regular rhythm  Pulses: Normal pulses  Pulmonary:      Effort: Pulmonary effort is normal  No respiratory distress  Breath sounds: Normal breath sounds  Abdominal:      General: Bowel sounds are normal       Palpations: Abdomen is soft  Tenderness: There is no abdominal tenderness  Musculoskeletal:         General: Normal range of motion        Cervical back: Normal range of motion and neck supple  No rigidity or tenderness  Right lower leg: No edema  Left lower leg: No edema  Lymphadenopathy:      Cervical: No cervical adenopathy  Skin:     General: Skin is warm and dry  Capillary Refill: Capillary refill takes less than 2 seconds  Findings: No rash  Neurological:      General: No focal deficit present  Mental Status: She is alert and oriented to person, place, and time  Mental status is at baseline     Psychiatric:         Mood and Affect: Mood normal          Behavior: Behavior normal          Vital Signs  ED Triage Vitals [03/31/22 1119]   Temperature Pulse Respirations Blood Pressure SpO2   98 3 °F (36 8 °C) 95 18 141/79 99 %      Temp Source Heart Rate Source Patient Position - Orthostatic VS BP Location FiO2 (%)   Oral Monitor -- -- --      Pain Score       7           Vitals:    03/31/22 1119   BP: 141/79   Pulse: 95         Visual Acuity      ED Medications  Medications   sodium chloride 0 9 % bolus 1,000 mL (1,000 mL Intravenous New Bag 3/31/22 1240)   ondansetron (ZOFRAN) injection 4 mg (4 mg Intravenous Given 3/31/22 1239)   dicyclomine (BENTYL) tablet 20 mg (20 mg Oral Given 3/31/22 1240)   ketorolac (TORADOL) injection 15 mg (15 mg Intravenous Given 3/31/22 1251)       Diagnostic Studies  Results Reviewed     Procedure Component Value Units Date/Time    Comprehensive metabolic panel [[id number]] Collected: 03/31/22 1233    Lab Status: Final result Specimen: Blood from Arm, Left Updated: 03/31/22 1321     Sodium 140 mmol/L      Potassium 3 5 mmol/L      Chloride 108 mmol/L      CO2 28 mmol/L      ANION GAP 4 mmol/L      BUN 9 mg/dL      Creatinine 0 69 mg/dL      Glucose 74 mg/dL      Calcium 9 2 mg/dL      AST 14 U/L      ALT 13 U/L      Alkaline Phosphatase 81 U/L      Total Protein 7 3 g/dL      Albumin 3 6 g/dL      Total Bilirubin 0 23 mg/dL      eGFR 126 ml/min/1 73sq m     Narrative:      [place] guidelines for Chronic Kidney Disease (CKD):     Stage 1 with normal or high GFR (GFR > 90 mL/min/1 73 square meters)    Stage 2 Mild CKD (GFR = 60-89 mL/min/1 73 square meters)    Stage 3A Moderate CKD (GFR = 45-59 mL/min/1 73 square meters)    Stage 3B Moderate CKD (GFR = 30-44 mL/min/1 73 square meters)    Stage 4 Severe CKD (GFR = 15-29 mL/min/1 73 square meters)    Stage 5 End Stage CKD (GFR <15 mL/min/1 73 square meters)  Note: GFR calculation is accurate only with a steady state creatinine    Lipase [[id number]]  (Normal) Collected: 03/31/22 1233    Lab Status: Final result Specimen: Blood from Arm, Left Updated: 03/31/22 1321     Lipase 106 u/L     UA w Reflex to Microscopic w Reflex to Culture [[id number]] Collected: 03/31/22 1238    Lab Status: Final result Specimen: Urine, Clean Catch Updated: 03/31/22 1318     Color, UA Light Yellow     Clarity, UA Clear     Specific Gravity, UA 1 014     pH, UA 7 5     Leukocytes, UA Negative     Nitrite, UA Negative     Protein, UA Negative mg/dl      Glucose, UA Negative mg/dl      Ketones, UA Negative mg/dl      Urobilinogen, UA <2 0 mg/dl      Bilirubin, UA Negative     Blood, UA Negative    CBC and differential [[id number]]  (Abnormal) Collected: 03/31/22 1233    Lab Status: Final result Specimen: Blood from Arm, Left Updated: 03/31/22 1303     WBC 10 79 Thousand/uL      RBC 4 53 Million/uL      Hemoglobin 13 7 g/dL      Hematocrit 41 5 %      MCV 92 fL      MCH 30 2 pg      MCHC 33 0 g/dL      RDW 12 4 %      MPV 10 2 fL      Platelets 219 Thousands/uL      nRBC 0 /100 WBCs      Neutrophils Relative 72 %      Immat GRANS % 1 %      Lymphocytes Relative 16 %      Monocytes Relative 7 %      Eosinophils Relative 3 %      Basophils Relative 1 %      Neutrophils Absolute 7 83 Thousands/µL      Immature Grans Absolute 0 05 Thousand/uL      Lymphocytes Absolute 1 74 Thousands/µL      Monocytes Absolute 0 79 Thousand/µL      Eosinophils Absolute 0 30 Thousand/µL      Basophils Absolute 0 08 Thousands/µL     POCT pregnancy, urine [[id number]]     Lab Status: No result                  No orders to display              Procedures  Procedures         ED Course         CRAFFT      Most Recent Value   SBIRT (13-21 yo)    In order to provide better care to our patients, we are screening all of our patients for alcohol and drug use  Would it be okay to ask you these screening questions? No Filed at: 03/31/2022 1122                                          [facility]  Number of Diagnoses or Management Options  Abdominal pain: established and worsening  Diarrhea: new and requires workup  Nausea: new and requires workup  Ovarian cyst  Diagnosis management comments: Patient presents with ongoing abdominal pain  Now with diarrhea  No real left lower quadrant pain there is no real pain to palpation  Patient states her pain is much improved she is in the room playing cards with friends in no acute distress  Patient to follow-up with OBGYN next week  Patient be given Bentyl and Zofran  Follow up with family doctor as well  All labs reviewed by this provider      Risk of Complications, Morbidity, and/or Mortality  Presenting problems: moderate  Diagnostic procedures: moderate  Management options: moderate    Patient Progress  Patient progress: stable      Disposition  Final diagnoses:   Abdominal pain   Nausea   Diarrhea   Ovarian cyst     Time reflects when diagnosis was documented in both MDM as applicable and the Disposition within this note     Time User Action Codes Description Comment    3/31/2022  1:28 PM DinapoliElkin Add [R10 9] Abdominal pain     3/31/2022  1:28 PM Alycia Bar [R11 0] Nausea     3/31/2022  1:28 PM Alycia Bar [R19 7] Diarrhea     3/31/2022  1:28 PM [address] [F32 339] Ovarian cyst       ED Disposition     ED Disposition Condition Date/Time Comment    Discharge Stable Thu Mar 31, 2022  1:28 PM [facility] discharge to home/self care             Follow-up Information     Follow up With Specialties Details Why Luzma Youngblood MD Pediatrics Call in 1 day  [place] [address]  [phone]            Patient's Medications   Discharge Prescriptions    DICYCLOMINE (BENTYL) 20 MG TABLET    Take 1 tablet (20 mg total) by mouth 2 (two) times a day       Start Date: 3/31/2022 End Date: --       Order Dose: 20 mg       Quantity: 6 tablet    Refills: 0    ONDANSETRON (ZOFRAN ODT) 4 MG DISINTEGRATING TABLET    Take 1 tablet (4 mg total) by mouth every 6 (six) hours as needed for nausea or vomiting       Start Date: 3/31/2022 End Date: --       Order Dose: 4 mg       Quantity: 10 tablet    Refills: 0       No discharge procedures on file      PDMP Review     None          ED Provider  Electronically Signed by           Nadine King PA-C  03/31/22 3309

## 2022-03-31 NOTE — DISCHARGE INSTRUCTIONS
Follow-up with OBGYN as planned next week    Follow-up with family doctor for recheck    Return with any worsening symptoms questions, or concerns

## 2022-04-05 ENCOUNTER — OFFICE VISIT (OUTPATIENT)
Dept: OBGYN CLINIC | Facility: CLINIC | Age: 22
End: 2022-04-05
Payer: COMMERCIAL

## 2022-04-05 VITALS
DIASTOLIC BLOOD PRESSURE: 72 MMHG | WEIGHT: 227.6 LBS | BODY MASS INDEX: 35.72 KG/M2 | SYSTOLIC BLOOD PRESSURE: 122 MMHG | HEIGHT: 67 IN

## 2022-04-05 DIAGNOSIS — N83.202 CYST OF LEFT OVARY: Primary | ICD-10-CM

## 2022-04-05 PROCEDURE — 99203 OFFICE O/P NEW LOW 30 MIN: CPT | Performed by: OBSTETRICS & GYNECOLOGY

## 2022-04-05 NOTE — PROGRESS NOTES
Assessment/Plan:    Discussed cyst, as her pain has been controlled and not requiring medication it is okay to observe and plan repeat ultrasound in 6 weeks  If enlarges further or has continued worsened pain may need to consider surgical excision  Discussed risk of ovarian torsion with patient - aware if any severe pain needs to present to ER for further evaluation  Also discussed option moving forward for options for cyst suppression  Could consider OCP for contraception, cyst prevention, cycle control  She will consider this option if necessary  Cyst of left ovary  -     US pelvis complete w transvaginal; Future        Subjective:      Patient ID: Anai Griffith is a 25 y o  female  Had normal period cycle in March    Last Monday had severe cramping/stomach pains on left side  Does have known history of kidney stones, thought was related  [facility] sent her [facility] ER  Diagnosed at that time with ovarian cyst, not a stone  Seen again on Thursday - more GI related  Since that time overall has been feeling better  Today has period cramps and mild back pain - not yet requiring OTC analgesia  No bleeding today  No history of ovarian cysts  Condoms for contraception  The following portions of the patient's history were reviewed and updated as appropriate: current medications and problem list     Review of Systems   Constitutional: Negative for chills and fever  Genitourinary: Positive for pelvic pain  Negative for vaginal bleeding and vaginal discharge  Musculoskeletal: Positive for back pain  Objective:      /72 (BP Location: Left arm, Patient Position: Sitting, Cuff Size: Large)   Ht 5' 7 25" (1 708 m)   Wt 103 kg (227 lb 9 6 oz)   LMP 03/27/2022 (Exact Date)   BMI 35 38 kg/m²          Physical Exam  Vitals and nursing note reviewed  Constitutional:       Appearance: Normal appearance  Abdominal:      General: There is no distension  Palpations: Abdomen is soft  Tenderness: There is abdominal tenderness (RLQ, mild)  There is no guarding or rebound  Neurological:      Mental Status: She is alert     Psychiatric:         Mood and Affect: Mood normal          Behavior: Behavior normal

## 2022-04-19 ENCOUNTER — TELEPHONE (OUTPATIENT)
Dept: OBGYN CLINIC | Facility: CLINIC | Age: 22
End: 2022-04-19

## 2022-04-19 DIAGNOSIS — Z30.011 ENCOUNTER FOR INITIAL PRESCRIPTION OF CONTRACEPTIVE PILLS: Primary | ICD-10-CM

## 2022-04-19 RX ORDER — NORETHINDRONE ACETATE AND ETHINYL ESTRADIOL 1; .02 MG/1; MG/1
1 TABLET ORAL DAILY
Qty: 84 TABLET | Refills: 1 | Status: SHIPPED | OUTPATIENT
Start: 2022-04-19 | End: 2022-04-21 | Stop reason: SDUPTHER

## 2022-04-19 NOTE — TELEPHONE ENCOUNTER
Yes, this would be fine  I'll send rx to her pharmacy - then will need a 3 month pill check scheduled

## 2022-04-19 NOTE — TELEPHONE ENCOUNTER
----- Message from Miguelangel Cabezas sent at 4/18/2022  7:57 PM EDT -----  Regarding: Birth Control  Hello! I was thinking about it, would I be able to get the birth control pills to help with everything? Thanks!   Miguelangel Cabezas

## 2022-04-20 ENCOUNTER — TELEPHONE (OUTPATIENT)
Dept: OBGYN CLINIC | Facility: CLINIC | Age: 22
End: 2022-04-20

## 2022-04-25 ENCOUNTER — TELEPHONE (OUTPATIENT)
Dept: OBGYN CLINIC | Facility: CLINIC | Age: 22
End: 2022-04-25

## 2022-04-25 NOTE — TELEPHONE ENCOUNTER
----- Message from Anai Griffith sent at 4/25/2022  1:20 PM EDT -----  Regarding: reply from Dr Jaskaran Estrada! I have a quick question  When I went to the pharmacy, they gave me estradiol and taytulla  Which one do I take?

## 2022-04-26 DIAGNOSIS — Z30.011 ENCOUNTER FOR INITIAL PRESCRIPTION OF CONTRACEPTIVE PILLS: ICD-10-CM

## 2022-04-26 RX ORDER — NORETHINDRONE ACETATE AND ETHINYL ESTRADIOL 1; .02 MG/1; MG/1
1 TABLET ORAL DAILY
Qty: 84 TABLET | Refills: 0 | Status: SHIPPED | OUTPATIENT
Start: 2022-04-26

## 2022-05-13 ENCOUNTER — HOSPITAL ENCOUNTER (OUTPATIENT)
Dept: ULTRASOUND IMAGING | Facility: HOSPITAL | Age: 22
Discharge: HOME | End: 2022-05-13
Payer: COMMERCIAL

## 2022-05-13 DIAGNOSIS — N83.202 CYST OF LEFT OVARY: ICD-10-CM

## 2022-05-13 PROCEDURE — 76856 US EXAM PELVIC COMPLETE: CPT

## 2022-05-13 PROCEDURE — 76830 TRANSVAGINAL US NON-OB: CPT

## 2022-06-03 DIAGNOSIS — J45.40 MODERATE PERSISTENT ASTHMA WITHOUT COMPLICATION: ICD-10-CM

## 2022-06-06 RX ORDER — BUDESONIDE AND FORMOTEROL FUMARATE DIHYDRATE 160; 4.5 UG/1; UG/1
2 AEROSOL RESPIRATORY (INHALATION) 2 TIMES DAILY
Qty: 30.6 G | Refills: 1 | Status: SHIPPED | OUTPATIENT
Start: 2022-06-06

## 2022-06-24 ENCOUNTER — OFFICE VISIT (OUTPATIENT)
Dept: URGENT CARE | Age: 22
End: 2022-06-24
Payer: COMMERCIAL

## 2022-06-24 VITALS
OXYGEN SATURATION: 98 % | HEART RATE: 89 BPM | SYSTOLIC BLOOD PRESSURE: 120 MMHG | DIASTOLIC BLOOD PRESSURE: 69 MMHG | RESPIRATION RATE: 16 BRPM | TEMPERATURE: 97 F

## 2022-06-24 DIAGNOSIS — H66.002 NON-RECURRENT ACUTE SUPPURATIVE OTITIS MEDIA OF LEFT EAR WITHOUT SPONTANEOUS RUPTURE OF TYMPANIC MEMBRANE: Primary | ICD-10-CM

## 2022-06-24 PROCEDURE — G0382 LEV 3 HOSP TYPE B ED VISIT: HCPCS

## 2022-06-24 RX ORDER — AMOXICILLIN AND CLAVULANATE POTASSIUM 875; 125 MG/1; MG/1
1 TABLET, FILM COATED ORAL EVERY 12 HOURS SCHEDULED
Qty: 20 TABLET | Refills: 0 | Status: SHIPPED | OUTPATIENT
Start: 2022-06-24 | End: 2022-07-04

## 2022-06-24 NOTE — PATIENT INSTRUCTIONS
Please take antibiotics once in the morning and once at night for the next 10 days  1   Drink plenty fluids  2   Take probiotics [i e  Yogurt, Acidophilus, Florastor (liquid)] daily  3   Over-the-counter medications as needed for symptomatic care  4    Advance activities as tolerated  5    Follow-up with your primary care physician in 3-4 days  6   Go to emergency room if symptoms are worsening      7   Use a humidifier at bedtime

## 2022-06-24 NOTE — PROGRESS NOTES
[facility]        NAME: Miguelangel Cabezas is a 25 y o  female  : 2000    MRN: [id number]  DATE: 2022  TIME: 4:20 PM    Assessment and Plan   Non-recurrent acute suppurative otitis media of left ear without spontaneous rupture of tympanic membrane [H66 [id number]]  1  Non-recurrent acute suppurative otitis media of left ear without spontaneous rupture of tympanic membrane  amoxicillin-clavulanate (AUGMENTIN) 875-125 mg per tablet         Patient Instructions     Antibiotics as discussed  Follow up with PCP in 3-5 days  Proceed to  ER if symptoms worsen  Chief Complaint     Chief Complaint   Patient presents with    Earache         History of Present Illness       Patient presenting for evaluation of left-sided ear pain worsening over the past 2 days  She states that this pain is sharp and at times radiates down her jaw  He denies any drainage or decreased hearing  She denies any tinnitus, fevers, chills or generalized body aches  Patient states that she is taking ibuprofen for symptoms, is provided minimal relief  Review of Systems   Review of Systems   Constitutional: Negative for chills and fever  HENT: Positive for ear pain  Negative for sore throat  Eyes: Negative for pain and visual disturbance  Respiratory: Negative for cough and shortness of breath  Cardiovascular: Negative for chest pain and palpitations  Gastrointestinal: Negative for abdominal pain and vomiting  Genitourinary: Negative for dysuria and hematuria  Musculoskeletal: Negative for arthralgias and back pain  Skin: Negative for color change and rash  Neurological: Negative for seizures and syncope  All other systems reviewed and are negative          Current Medications       Current Outpatient Medications:     amoxicillin-clavulanate (AUGMENTIN) 875-125 mg per tablet, Take 1 tablet by mouth every 12 (twelve) hours for 10 days, Disp: 20 tablet, Rfl: 0    albuterol (PROVENTIL HFA,VENTOLIN HFA) 90 mcg/act inhaler, , Disp: , Rfl:     ascorbic acid (VITAMIN C) 500 MG tablet, Take 500 mg by mouth, Disp: , Rfl:     dicyclomine (BENTYL) 20 mg tablet, Take 1 tablet (20 mg total) by mouth 2 (two) times a day (Patient not taking: Reported on 4/5/2022 ), Disp: 6 tablet, Rfl: 0    doxycycline hyclate (VIBRAMYCIN) 100 mg capsule, , Disp: , Rfl:     fluticasone (FLONASE) 50 mcg/act nasal spray, 1 spray into each nostril daily, Disp: 15 8 mL, Rfl: 5    ibuprofen (MOTRIN) 800 mg tablet, Take 1 tablet (800 mg total) by mouth every 8 (eight) hours for 5 days, Disp: 15 tablet, Rfl: 0    morphine (MSIR) 15 mg tablet, Take 0 5 tablets (7 5 mg total) by mouth every 4 (four) hours as needed for severe painMax Daily Amount: 45 mg (Patient not taking: Reported on 8/4/2021), Disp: 5 tablet, Rfl: 0    Multiple Vitamin (Multi-Day) TABS, Take 1 tablet by mouth (Patient not taking: Reported on 4/5/2022 ), Disp: , Rfl:     norethindrone-ethinyl estradiol (Junel 1/20) 1-20 MG-MCG per tablet, Take 1 tablet by mouth daily, Disp: 84 tablet, Rfl: 0    ondansetron (Zofran ODT) 4 mg disintegrating tablet, Take 1 tablet (4 mg total) by mouth every 6 (six) hours as needed for nausea or vomiting, Disp: 10 tablet, Rfl: 0    ondansetron (ZOFRAN) 4 mg tablet, Take 1 tablet (4 mg total) by mouth every 6 (six) hours (Patient not taking: Reported on 10/7/2021), Disp: 12 tablet, Rfl: 0    predniSONE 10 mg tablet, Take 4 tablets (40 mg total) by mouth daily 40 mg x 3 days, 30 mg x 3 days, 20 mg x 3 days, 10 mg x 3 days (Patient not taking: Reported on 3/22/2022 ), Disp: 30 tablet, Rfl: 0    predniSONE 20 mg tablet, , Disp: , Rfl:     Probiotic Product (Misc Intestinal Vikki Regulat) CAPS, Take by mouth (Patient not taking: Reported on 9/22/2021), Disp: , Rfl:     Symbicort 160-4 5 MCG/ACT inhaler, Inhale 2 puffs 2 (two) times a day Rinse mouth after use , Disp: 30 6 g, Rfl: 1    Current Allergies     Allergies as of 06/24/2022    (No Known Allergies)            The following portions of the patient's history were reviewed and updated as appropriate: allergies, current medications, past family history, past medical history, past social history, past surgical history and problem list      Past Medical History:   Diagnosis Date    Exercise-induced asthma        Past Surgical History:   Procedure Laterality Date    APPENDECTOMY      WISDOM TOOTH EXTRACTION         Family History   Problem Relation Age of Onset    Heart disease Maternal Grandfather          Medications have been verified  Objective   /69   Pulse 89   Temp (!) 97 °F (36 1 °C)   Resp 16   SpO2 98%        Physical Exam     Physical Exam  Vitals and nursing note reviewed  Constitutional:       General: She is not in acute distress  Appearance: Normal appearance  She is not ill-appearing  HENT:      Head: Normocephalic and atraumatic  Right Ear: Tympanic membrane normal       Left Ear: Tenderness present  Tympanic membrane is erythematous and bulging  Nose: Nose normal  No congestion or rhinorrhea  Mouth/Throat:      Mouth: Mucous membranes are moist       Pharynx: Oropharynx is clear  No oropharyngeal exudate or posterior oropharyngeal erythema  Eyes:      General:         Right eye: No discharge  Left eye: No discharge  Extraocular Movements: Extraocular movements intact  Conjunctiva/sclera: Conjunctivae normal       Pupils: Pupils are equal, round, and reactive to light  Cardiovascular:      Rate and Rhythm: Normal rate and regular rhythm  Pulses: Normal pulses  Heart sounds: Normal heart sounds  No murmur heard  No friction rub  No gallop  Pulmonary:      Effort: No respiratory distress  Breath sounds: Normal breath sounds  No wheezing, rhonchi or rales  Abdominal:      General: Abdomen is flat  Bowel sounds are normal       Palpations: Abdomen is soft  Tenderness:  There is no abdominal tenderness  There is no guarding or rebound  Musculoskeletal:         General: No tenderness  Normal range of motion  Cervical back: Normal range of motion and neck supple  Skin:     General: Skin is warm and dry  Capillary Refill: Capillary refill takes less than 2 seconds  Neurological:      General: No focal deficit present  Mental Status: She is alert and oriented to person, place, and time     Psychiatric:         Mood and Affect: Mood normal          Behavior: Behavior normal

## 2022-06-27 ENCOUNTER — APPOINTMENT (EMERGENCY)
Dept: RADIOLOGY | Facility: HOSPITAL | Age: 22
End: 2022-06-27
Payer: COMMERCIAL

## 2022-06-27 ENCOUNTER — HOSPITAL ENCOUNTER (EMERGENCY)
Facility: HOSPITAL | Age: 22
Discharge: HOME | End: 2022-06-27
Attending: EMERGENCY MEDICINE | Admitting: EMERGENCY MEDICINE
Payer: COMMERCIAL

## 2022-06-27 VITALS
SYSTOLIC BLOOD PRESSURE: 159 MMHG | OXYGEN SATURATION: 97 % | TEMPERATURE: 98.4 F | HEART RATE: 90 BPM | DIASTOLIC BLOOD PRESSURE: 90 MMHG | RESPIRATION RATE: 18 BRPM

## 2022-06-27 DIAGNOSIS — R10.9 ABDOMINAL PAIN: Primary | ICD-10-CM

## 2022-06-27 DIAGNOSIS — N83.209 OVARIAN CYST: ICD-10-CM

## 2022-06-27 LAB
ALBUMIN SERPL BCP-MCNC: 2.6 G/DL (ref 3.5–5)
ALP SERPL-CCNC: 76 U/L (ref 46–384)
ALT SERPL W P-5'-P-CCNC: 10 U/L (ref 12–78)
ANION GAP SERPL CALCULATED.3IONS-SCNC: 6 MMOL/L (ref 4–13)
AST SERPL W P-5'-P-CCNC: 8 U/L (ref 5–45)
BASOPHILS # BLD AUTO: 0.1 THOUSANDS/ΜL (ref 0–0.1)
BASOPHILS NFR BLD AUTO: 1 % (ref 0–1)
BILIRUB SERPL-MCNC: 0.14 MG/DL (ref 0.2–1)
BILIRUB UR QL STRIP: NEGATIVE
BUN SERPL-MCNC: 7 MG/DL (ref 5–25)
CALCIUM ALBUM COR SERPL-MCNC: 10.2 MG/DL (ref 8.3–10.1)
CALCIUM SERPL-MCNC: 9.1 MG/DL (ref 8.3–10.1)
CHLORIDE SERPL-SCNC: 110 MMOL/L (ref 100–108)
CLARITY UR: CLEAR
CO2 SERPL-SCNC: 24 MMOL/L (ref 21–32)
COLOR UR: YELLOW
COLOR, POC: YELLOW
CREAT SERPL-MCNC: 0.56 MG/DL (ref 0.6–1.3)
EOSINOPHIL # BLD AUTO: 0.66 THOUSAND/ΜL (ref 0–0.61)
EOSINOPHIL NFR BLD AUTO: 5 % (ref 0–6)
ERYTHROCYTE [DISTWIDTH] IN BLOOD BY AUTOMATED COUNT: 12.5 % (ref 11.6–15.1)
EXT PREG TEST URINE: NEGATIVE
EXT. CONTROL ED NAV: NORMAL
GFR SERPL CREATININE-BSD FRML MDRD: 135 ML/MIN/1.73SQ M
GLUCOSE SERPL-MCNC: 84 MG/DL (ref 65–140)
GLUCOSE UR STRIP-MCNC: NEGATIVE MG/DL
HCT VFR BLD AUTO: 41 % (ref 34.8–46.1)
HGB BLD-MCNC: 14 G/DL (ref 11.5–15.4)
HGB UR QL STRIP.AUTO: NEGATIVE
IMM GRANULOCYTES # BLD AUTO: 0.06 THOUSAND/UL (ref 0–0.2)
IMM GRANULOCYTES NFR BLD AUTO: 1 % (ref 0–2)
KETONES UR STRIP-MCNC: NEGATIVE MG/DL
LEUKOCYTE ESTERASE UR QL STRIP: NEGATIVE
LIPASE SERPL-CCNC: 118 U/L (ref 73–393)
LYMPHOCYTES # BLD AUTO: 2.4 THOUSANDS/ΜL (ref 0.6–4.47)
LYMPHOCYTES NFR BLD AUTO: 20 % (ref 14–44)
MCH RBC QN AUTO: 31 PG (ref 26.8–34.3)
MCHC RBC AUTO-ENTMCNC: 34.1 G/DL (ref 31.4–37.4)
MCV RBC AUTO: 91 FL (ref 82–98)
MONOCYTES # BLD AUTO: 1.1 THOUSAND/ΜL (ref 0.17–1.22)
MONOCYTES NFR BLD AUTO: 9 % (ref 4–12)
NEUTROPHILS # BLD AUTO: 7.83 THOUSANDS/ΜL (ref 1.85–7.62)
NEUTS SEG NFR BLD AUTO: 64 % (ref 43–75)
NITRITE UR QL STRIP: NEGATIVE
NRBC BLD AUTO-RTO: 0 /100 WBCS
PH UR STRIP.AUTO: 6.5 [PH] (ref 4.5–8)
PLATELET # BLD AUTO: 394 THOUSANDS/UL (ref 149–390)
PMV BLD AUTO: 10.5 FL (ref 8.9–12.7)
POTASSIUM SERPL-SCNC: 3.5 MMOL/L (ref 3.5–5.3)
PROT SERPL-MCNC: 7.7 G/DL (ref 6.4–8.2)
PROT UR STRIP-MCNC: NEGATIVE MG/DL
RBC # BLD AUTO: 4.52 MILLION/UL (ref 3.81–5.12)
SODIUM SERPL-SCNC: 140 MMOL/L (ref 136–145)
SP GR UR STRIP.AUTO: >=1.03 (ref 1–1.03)
UROBILINOGEN UR QL STRIP.AUTO: 0.2 E.U./DL
WBC # BLD AUTO: 12.15 THOUSAND/UL (ref 4.31–10.16)

## 2022-06-27 PROCEDURE — 96375 TX/PRO/DX INJ NEW DRUG ADDON: CPT

## 2022-06-27 PROCEDURE — 76830 TRANSVAGINAL US NON-OB: CPT

## 2022-06-27 PROCEDURE — 99285 EMERGENCY DEPT VISIT HI MDM: CPT | Performed by: EMERGENCY MEDICINE

## 2022-06-27 PROCEDURE — 76856 US EXAM PELVIC COMPLETE: CPT

## 2022-06-27 PROCEDURE — 74176 CT ABD & PELVIS W/O CONTRAST: CPT

## 2022-06-27 PROCEDURE — 80053 COMPREHEN METABOLIC PANEL: CPT

## 2022-06-27 PROCEDURE — 81025 URINE PREGNANCY TEST: CPT

## 2022-06-27 PROCEDURE — 83690 ASSAY OF LIPASE: CPT

## 2022-06-27 PROCEDURE — 76705 ECHO EXAM OF ABDOMEN: CPT | Performed by: EMERGENCY MEDICINE

## 2022-06-27 PROCEDURE — 96376 TX/PRO/DX INJ SAME DRUG ADON: CPT

## 2022-06-27 PROCEDURE — G1004 CDSM NDSC: HCPCS

## 2022-06-27 PROCEDURE — 85025 COMPLETE CBC W/AUTO DIFF WBC: CPT

## 2022-06-27 PROCEDURE — 76775 US EXAM ABDO BACK WALL LIM: CPT | Performed by: EMERGENCY MEDICINE

## 2022-06-27 PROCEDURE — 96374 THER/PROPH/DIAG INJ IV PUSH: CPT

## 2022-06-27 PROCEDURE — 99284 EMERGENCY DEPT VISIT MOD MDM: CPT

## 2022-06-27 PROCEDURE — 81003 URINALYSIS AUTO W/O SCOPE: CPT

## 2022-06-27 PROCEDURE — 36415 COLL VENOUS BLD VENIPUNCTURE: CPT

## 2022-06-27 RX ORDER — KETOROLAC TROMETHAMINE 30 MG/ML
15 INJECTION, SOLUTION INTRAMUSCULAR; INTRAVENOUS ONCE
Status: COMPLETED | OUTPATIENT
Start: 2022-06-27 | End: 2022-06-27

## 2022-06-27 RX ORDER — MORPHINE SULFATE 4 MG/ML
4 INJECTION, SOLUTION INTRAMUSCULAR; INTRAVENOUS ONCE
Status: COMPLETED | OUTPATIENT
Start: 2022-06-27 | End: 2022-06-27

## 2022-06-27 RX ORDER — ONDANSETRON 4 MG/1
4 TABLET, FILM COATED ORAL EVERY 6 HOURS
Qty: 12 TABLET | Refills: 0 | Status: SHIPPED | OUTPATIENT
Start: 2022-06-27 | End: 2022-07-06 | Stop reason: ALTCHOICE

## 2022-06-27 RX ORDER — ACETAMINOPHEN 325 MG/1
650 TABLET ORAL ONCE
Status: COMPLETED | OUTPATIENT
Start: 2022-06-27 | End: 2022-06-27

## 2022-06-27 RX ORDER — ONDANSETRON 2 MG/ML
4 INJECTION INTRAMUSCULAR; INTRAVENOUS ONCE
Status: COMPLETED | OUTPATIENT
Start: 2022-06-27 | End: 2022-06-27

## 2022-06-27 RX ORDER — ONDANSETRON 4 MG/1
4 TABLET, ORALLY DISINTEGRATING ORAL EVERY 6 HOURS PRN
Qty: 20 TABLET | Refills: 0 | Status: SHIPPED | OUTPATIENT
Start: 2022-06-27 | End: 2022-06-27 | Stop reason: CLARIF

## 2022-06-27 RX ADMIN — MORPHINE SULFATE 4 MG: 4 INJECTION INTRAVENOUS at 20:02

## 2022-06-27 RX ADMIN — KETOROLAC TROMETHAMINE 15 MG: 30 INJECTION, SOLUTION INTRAMUSCULAR; INTRAVENOUS at 17:58

## 2022-06-27 RX ADMIN — ACETAMINOPHEN 650 MG: 325 TABLET, FILM COATED ORAL at 17:59

## 2022-06-27 RX ADMIN — ONDANSETRON 4 MG: 2 INJECTION INTRAMUSCULAR; INTRAVENOUS at 17:56

## 2022-06-27 RX ADMIN — ONDANSETRON 4 MG: 2 INJECTION INTRAMUSCULAR; INTRAVENOUS at 20:53

## 2022-06-27 NOTE — ED PROVIDER NOTES
History  Chief Complaint   Patient presents with    Abdominal Pain     Abd pain, vomiting, back pain since 0600  HPI  Alicia Dalton is a 25 y o  female with PMH significant for hemorrhagic cyst coming in today with complaint of abdominal pain  She reports this started this morning when she woke up this exam   Reports is difficult to localize, and the bilateral flank region, radiating to her back  She reports associated nausea  It has been progressively worsening since this morning  She has had similar symptoms in the past, and was diagnosed with a renal stone  She reports this appears mild in nature  She reports associated nausea with meals over the last week  Otherwise the patient denies any headache, vision changes, sore throat, congestion, cough, chest pain, shortness of breath, episodes of syncope, vaginal bleeding, discharge, dysuria  She had an appendectomy previously  No other abdominal surgeries  Last BM this AM was normal   She has been taking her OCP as directed  Last menstrual period was 1 and half weeks ago  She is not sexually active  She does not drink alcohol or smoke  She works as a teacher  No other complaints at this time      - No language barrier    - History obtained from patient and chart   - Reviewed and documented relevant past medical/family/social history  - There are no limitations to the history obtained  - Previous charting was reviewed  Some data reviewed included below for ease of access whether or not it is relevant to this patient encounter  Prior to Admission Medications   Prescriptions Last Dose Informant Patient Reported? Taking?    Multiple Vitamin (Multi-Day) TABS  Self Yes No   Sig: Take 1 tablet by mouth   Patient not taking: Reported on 4/5/2022    Probiotic Product (Misc Intestinal Vikki Regulat) CAPS  Self Yes No   Sig: Take by mouth   Patient not taking: Reported on 9/22/2021   Symbicort 160-4 5 MCG/ACT inhaler   No No   Sig: Inhale 2 puffs 2 (two) times a day Rinse mouth after use     albuterol (PROVENTIL HFA,VENTOLIN HFA) 90 mcg/act inhaler  Self Yes No   amoxicillin-clavulanate (AUGMENTIN) 875-125 mg per tablet   No No   Sig: Take 1 tablet by mouth every 12 (twelve) hours for 10 days   ascorbic acid (VITAMIN C) 500 MG tablet  Self Yes No   Sig: Take 500 mg by mouth   dicyclomine (BENTYL) 20 mg tablet  Self No No   Sig: Take 1 tablet (20 mg total) by mouth 2 (two) times a day   Patient not taking: Reported on 2022    doxycycline hyclate (VIBRAMYCIN) 100 mg capsule  Self Yes No   Patient not taking: Reported on 2021   fluticasone (FLONASE) 50 mcg/act nasal spray  Self No No   Si spray into each nostril daily   ibuprofen (MOTRIN) 800 mg tablet   No No   Sig: Take 1 tablet (800 mg total) by mouth every 8 (eight) hours for 5 days   morphine (MSIR) 15 mg tablet  Self No No   Sig: Take 0 5 tablets (7 5 mg total) by mouth every 4 (four) hours as needed for severe painMax Daily Amount: 45 mg   Patient not taking: Reported on 2021   norethindrone-ethinyl estradiol (Junel ) 1-20 MG-MCG per tablet   No No   Sig: Take 1 tablet by mouth daily   ondansetron (ZOFRAN) 4 mg tablet  Self No No   Sig: Take 1 tablet (4 mg total) by mouth every 6 (six) hours   Patient not taking: Reported on 10/7/2021   ondansetron (Zofran ODT) 4 mg disintegrating tablet  Self No No   Sig: Take 1 tablet (4 mg total) by mouth every 6 (six) hours as needed for nausea or vomiting   predniSONE 10 mg tablet  Self No No   Sig: Take 4 tablets (40 mg total) by mouth daily 40 mg x 3 days, 30 mg x 3 days, 20 mg x 3 days, 10 mg x 3 days   Patient not taking: Reported on 3/22/2022    predniSONE 20 mg tablet  Self Yes No   Patient not taking: Reported on 2021      Facility-Administered Medications: None       Past Medical History:   Diagnosis Date    Exercise-induced asthma        Past Surgical History:   Procedure Laterality Date    APPENDECTOMY      WISDOM TOOTH EXTRACTION Family History   Problem Relation Age of Onset    Heart disease Maternal Grandfather      I have reviewed and agree with the history as documented  E-Cigarette/Vaping    E-Cigarette Use Never User      E-Cigarette/Vaping Substances    Nicotine No     THC No     CBD No     Flavoring No     Other No     Unknown No      Social History     Tobacco Use    Smoking status: Never Smoker    Smokeless tobacco: Never Used   Vaping Use    Vaping Use: Never used   Substance Use Topics    Alcohol use: Yes     Comment: social     Drug use: Never        Review of Systems   Constitutional: Negative for chills and fever  HENT: Negative for sore throat  Eyes: Negative for pain and visual disturbance  Respiratory: Negative for shortness of breath  Cardiovascular: Negative for chest pain and palpitations  Gastrointestinal: Positive for abdominal pain and nausea  Negative for vomiting  Genitourinary: Negative for dysuria  Musculoskeletal: Negative for back pain  Skin: Negative for color change and rash  Neurological: Negative for syncope  All other systems reviewed and are negative  Physical Exam  ED Triage Vitals [06/27/22 1615]   Temperature Pulse Respirations Blood Pressure SpO2   98 4 °F (36 9 °C) 90 18 159/90 97 %      Temp src Heart Rate Source Patient Position - Orthostatic VS BP Location FiO2 (%)   -- -- -- -- --      Pain Score       6             Orthostatic Vital Signs  Vitals:    06/27/22 1615   BP: 159/90   Pulse: 90       Physical Exam  Vitals and nursing note reviewed  Constitutional:       General: She is not in acute distress  Appearance: She is well-developed  HENT:      Head: Normocephalic and atraumatic  Eyes:      Conjunctiva/sclera: Conjunctivae normal    Cardiovascular:      Rate and Rhythm: Normal rate and regular rhythm  Heart sounds: No murmur heard  Pulmonary:      Effort: Pulmonary effort is normal  No respiratory distress        Breath sounds: Normal breath sounds  Abdominal:      Palpations: Abdomen is soft  Tenderness: There is abdominal tenderness in the right upper quadrant  There is no right CVA tenderness, left CVA tenderness, guarding or rebound  Negative signs include Bunch's sign  Musculoskeletal:      Cervical back: Neck supple  Skin:     General: Skin is warm and dry  Neurological:      General: No focal deficit present  Mental Status: She is alert     Psychiatric:         Mood and Affect: Mood normal          ED Medications  Medications   ondansetron (ZOFRAN) injection 4 mg (4 mg Intravenous Given 6/27/22 1756)   acetaminophen (TYLENOL) tablet 650 mg (650 mg Oral Given 6/27/22 1759)   ketorolac (TORADOL) injection 15 mg (15 mg Intravenous Given 6/27/22 1758)   morphine injection 4 mg (4 mg Intravenous Given 6/2002)   ondansetron (ZOFRAN) injection 4 mg (4 mg Intravenous Given 6/27/22 2053)       Diagnostic Studies  Results Reviewed     Procedure Component Value Units Date/Time    Comprehensive metabolic panel [[id number]]  (Abnormal) Collected: 06/27/22 1741    Lab Status: Final result Specimen: Blood from Arm, Right Updated: 06/27/22 1812     Sodium 140 mmol/L      Potassium 3 5 mmol/L      Chloride 110 mmol/L      CO2 24 mmol/L      ANION GAP 6 mmol/L      BUN 7 mg/dL      Creatinine 0 56 mg/dL      Glucose 84 mg/dL      Calcium 9 1 mg/dL      Corrected Calcium 10 2 mg/dL      AST 8 U/L      ALT 10 U/L      Alkaline Phosphatase 76 U/L      Total Protein 7 7 g/dL      Albumin 2 6 g/dL      Total Bilirubin 0 14 mg/dL      eGFR 135 ml/min/1 73sq m     Narrative:      Patricio guidelines for Chronic Kidney Disease (CKD):     Stage 1 with normal or high GFR (GFR > 90 mL/min/1 73 square meters)    Stage 2 Mild CKD (GFR = 60-89 mL/min/1 73 square meters)    Stage 3A Moderate CKD (GFR = 45-59 mL/min/1 73 square meters)    Stage 3B Moderate CKD (GFR = 30-44 mL/min/1 73 square meters)    Stage 4 Severe CKD (GFR = 15-29 mL/min/1 73 square meters)    Stage 5 End Stage CKD (GFR <15 mL/min/1 73 square meters)  Note: GFR calculation is accurate only with a steady state creatinine    Lipase [[id number]]  (Normal) Collected: 06/27/22 1741    Lab Status: Final result Specimen: Blood from Arm, Right Updated: 06/27/22 1812     Lipase 118 u/L     CBC and differential [[id number]]  (Abnormal) Collected: 06/27/22 1741    Lab Status: Final result Specimen: Blood from Arm, Right Updated: 06/27/22 1754     WBC 12 15 Thousand/uL      RBC 4 52 Million/uL      Hemoglobin 14 0 g/dL      Hematocrit 41 0 %      MCV 91 fL      MCH 31 0 pg      MCHC 34 1 g/dL      RDW 12 5 %      MPV 10 5 fL      Platelets 055 Thousands/uL      nRBC 0 /100 WBCs      Neutrophils Relative 64 %      Immat GRANS % 1 %      Lymphocytes Relative 20 %      Monocytes Relative 9 %      Eosinophils Relative 5 %      Basophils Relative 1 %      Neutrophils Absolute 7 83 Thousands/µL      Immature Grans Absolute 0 06 Thousand/uL      Lymphocytes Absolute 2 40 Thousands/µL      Monocytes Absolute 1 10 Thousand/µL      Eosinophils Absolute 0 66 Thousand/µL      Basophils Absolute 0 10 Thousands/µL     POCT pregnancy, urine [[id number]]  (Normal) Resulted: 06/27/22 1748    Lab Status: Final result Updated: 06/27/22 1748     EXT PREG TEST UR (Ref: Negative) negative     Control valid    POCT urinalysis dipstick [[id number]]  (Normal) Resulted: 06/27/22 1747    Lab Status: Final result Specimen: Urine Updated: 06/27/22 1748     Color, UA yellow    Urine Macroscopic, POC [[id number]] Collected: 06/27/22 1744    Lab Status: Final result Specimen: Urine Updated: 06/27/22 1746     Color, UA Yellow     Clarity, UA Clear     pH, UA 6 5     Leukocytes, UA Negative     Nitrite, UA Negative     Protein, UA Negative mg/dl      Glucose, UA Negative mg/dl      Ketones, UA Negative mg/dl      Urobilinogen, UA 0 2 E U /dl      Bilirubin, UA Negative     Occult Blood, UA Negative Specific Gravity, UA >=1 030    Narrative:      CLINITEK RESULT                 US pelvis complete w transvaginal   Final Result by Denys Essex, MD (06/27 2133)      1   4 0 cm simple right ovarian cyst  Based on the ACR O-RADS system, this is O-RADS category 2 (almost certain benign with <4% risk of malignancy ) The management recommendation is no additional workup or followup needed  Doppler flow within normal    limits  REFERENCE: Radiology 2020; [id number]         2  Unremarkable left ovary  Workstation performed: [id number]         CT abdomen pelvis wo contrast   Final Result by Mihaela Guardado MD (06/27 1927)         1  Bilateral nonobstructing renal calculi  No ureteral or bladder calculi  No collecting system dilatation  2   3 8 x 3 6 cm right ovarian cyst       The study was marked in EPIC for immediate notification              Workstation performed: [id number]               Procedures  POC Biliary US    Date/Time: 6/27/2022 6:51 PM  Performed by: Kallie Gomez MD  Authorized by: Kallie Gomez MD     Performed by:  Resident  Other Assisting Provider: No    Procedure details:     Exam Type:  Educational    Indications: upper right quadrant abdominal pain      Assessment for:  Cholecystitis and cholelithiasis    Views obtained: gallbladder (transverse and longitudinal) and liver      Image quality: non-diagnostic      Image availability:  Images available in PACS  Findings:     Cholelithiasis: not identified      Common bile duct:  Unable to visualize    Gallbladder wall:  Unable to visualize    Pericholecystic fluid: not identified      Sonographic Bunch's sign: negative    Interpretation:     Biliary ultrasound impressions: indeterminate    POC Renal US    Date/Time: 6/27/2022 6:52 PM  Performed by: Kallei Gomez MD  Authorized by: Kallie Gomez MD     Performed by:  Resident  Other Assisting Provider: No    Procedure details:     Exam Type:  Diagnostic    Indications: abdominal pain      Assessment for:  Suspected hydronephrosis    Views obtained: left kidney and right kidney      Image quality: diagnostic      Image availability:  Images available in PACS  Findings:     LEFT kidney findings: unremarkable      RIGHT kidney findings: unremarkable      RIGHT hydronephrosis: none    Interpretation:     Renal ultrasound impressions: normal exam            ED Course  ED Course as of 06/28/22 0856   Mon Jun 27, 2022   1752 PREGNANCY TEST URINE: negative   1752 Leukocytes, UA: Negative   1752 Nitrite, UA: Negative   1804 WBC(!): 12 15   1821 Added on scan given negative findings so far  Will bedside US as well                                       MDM  Number of Diagnoses or Management Options  Abdominal pain  Ovarian cyst  Diagnosis management comments: Luzma Kee is a 25 y o  who presents with complaints of abdominal pain    Vital signs are stable, physical exam shows right upper quadrant tenderness    Ddx:  Renal stone versus gallbladder pathology versus UTI versus ectopic pregnancy versus cyst versus other  Less likely to be infectious given afebrile status and reassuring vitals  Plan:  CBC, CMP, lipase, Tylenol, Zofran, ultrasound    Re-assessment: Remainder of care signed out  Chart review remarkable for R ovarian cyst, confirmed on US  She was discharged home with instructions for close follow up            Amount and/or Complexity of Data Reviewed  Clinical lab tests: ordered and reviewed  Tests in the radiology section of CPT®: ordered and reviewed    Risk of Complications, Morbidity, and/or Mortality  Presenting problems: moderate  Diagnostic procedures: low  Management options: low    Patient Progress  Patient progress: stable      Disposition  Final diagnoses:   Abdominal pain   Ovarian cyst     Time reflects when diagnosis was documented in both MDM as applicable and the Disposition within this note     Time User Action Codes Description Comment    6/27/2022  9:49 PM Evansjudit Herrera Add [R10 9] Abdominal pain     6/27/2022  9:49 PM Evansjudit Herrera Add [T21 251] Ovarian cyst       ED Disposition     ED Disposition   Discharge    Condition   Stable    Date/Time   Mon Jun 27, 2022  9:49 PM    Comment   Aristides Aguilar discharge to home/self care  Follow-up Information     Follow up With Specialties Details Why [address] [facility] Schedule an appointment as soon as possible for a visit   [address] [id number] [phone]          Discharge Medication List as of 6/27/2022  9:59 PM      START taking these medications    Details   !! ondansetron (ZOFRAN) 4 mg tablet Take 1 tablet (4 mg total) by mouth every 6 (six) hours, Starting Mon 6/27/2022, Normal       !! - Potential duplicate medications found  Please discuss with provider        CONTINUE these medications which have NOT CHANGED    Details   albuterol (PROVENTIL HFA,VENTOLIN HFA) 90 mcg/act inhaler Starting Fri 7/23/2021, Historical Med      amoxicillin-clavulanate (AUGMENTIN) 875-125 mg per tablet Take 1 tablet by mouth every 12 (twelve) hours for 10 days, Starting Fri 6/24/2022, Until Mon 7/4/2022, Normal      ascorbic acid (VITAMIN C) 500 MG tablet Take 500 mg by mouth, Historical Med      dicyclomine (BENTYL) 20 mg tablet Take 1 tablet (20 mg total) by mouth 2 (two) times a day, Starting Thu 3/31/2022, Print      doxycycline hyclate (VIBRAMYCIN) 100 mg capsule Starting Thu 6/24/2021, Historical Med      fluticasone (FLONASE) 50 mcg/act nasal spray 1 spray into each nostril daily, Starting Tue 3/22/2022, Normal      ibuprofen (MOTRIN) 800 mg tablet Take 1 tablet (800 mg total) by mouth every 8 (eight) hours for 5 days, Starting Tue 5/18/2021, Until Sun 5/23/2021, Normal      morphine (MSIR) 15 mg tablet Take 0 5 tablets (7 5 mg total) by mouth every 4 (four) hours as needed for severe painMax Daily Amount: 45 mg, Starting Sun 4/18/2021, Normal      Multiple Vitamin (Multi-Day) TABS Take 1 tablet by mouth, Historical Med      norethindrone-ethinyl estradiol (Junel 1/20) 1-20 MG-MCG per tablet Take 1 tablet by mouth daily, Starting Tue 4/26/2022, Normal      ondansetron (Zofran ODT) 4 mg disintegrating tablet Take 1 tablet (4 mg total) by mouth every 6 (six) hours as needed for nausea or vomiting, Starting Thu 3/31/2022, Print      !! ondansetron (ZOFRAN) 4 mg tablet Take 1 tablet (4 mg total) by mouth every 6 (six) hours, Starting Sun 4/18/2021, Normal      !! predniSONE 10 mg tablet Take 4 tablets (40 mg total) by mouth daily 40 mg x 3 days, 30 mg x 3 days, 20 mg x 3 days, 10 mg x 3 days, Starting Mon 2/21/2022, Normal      !! predniSONE 20 mg tablet Starting Fri 7/23/2021, Historical Med      Probiotic Product (Misc Intestinal Vikki Regulat) CAPS Take by mouth, Historical Med      Symbicort 160-4 5 MCG/ACT inhaler Inhale 2 puffs 2 (two) times a day Rinse mouth after use , Starting Mon 6/6/2022, Normal       !! - Potential duplicate medications found  Please discuss with provider  No discharge procedures on file  PDMP Review     None           ED Provider  Attending physically available and evaluated Peggy Sullivan  [facility] managed the patient along with the ED Attending      Electronically Signed by         Tiffani Waggoner MD  06/28/22 3284

## 2022-06-27 NOTE — DISCHARGE INSTRUCTIONS
Your workup here was not concerning for anything dangerous  Therefore there is no need for you to stay at the hospital for further testing  We feel safe to send you home  You can use Tylenol and Zofran for management of your symptoms  You should follow up with your primary care physician to assess for resolution of your symptoms and to determine if there is further evaluation that needs to be performed      Return to the emergency department if you have any symptoms of fevers, severe pain, or inability to eat

## 2022-06-27 NOTE — ED ATTENDING ATTESTATION
6/27/2022  Helena NUNEZ DO, saw and evaluated the patient  I have discussed the patient with the resident/non-physician practitioner and agree with the resident's/non-physician practitioner's findings, Plan of Care, and MDM as documented in the resident's/non-physician practitioner's note, except where noted  All available labs and Radiology studies were reviewed  I was present for key portions of any procedure(s) performed by the resident/non-physician practitioner and I was immediately available to provide assistance  At this point I agree with the current assessment done in the Emergency Department  I have conducted an independent evaluation of this patient a history and physical is as follows:    53-year-old female presents for right-sided abdominal pain  Started this morning  Mid abdominal pain  Radiates to the back  No hematuria  Does feel similar to previous kidney stones  Vomiting  Decreased appetite  Not necessarily worse with eating  Denies any constipation diarrhea fever chills or other systemic symptoms  She reports waxing waning her symptoms  Moderate severity  Last menstrual period was a week and half ago  She has a previous surgical history of appendectomy  She recently was diagnosed with a home very insists that resolved on repeat ultrasound a month and a half ago  On exam she has tenderness in the right lower right middle quadrant negative Bunch sign no rebound tenderness no guarding  Normal vital signs    Assessment plan:  Abdominal pain cholelithiasis or biliary colic versus ureteral colic versus ovarian cyst plan labs scan pain control reassess for disposition    ED Course    ultrasound shows ovarian cyst no evidence of torsion patient will follow-up with OBGYN    Critical Care Time  Procedures

## 2022-06-27 NOTE — Clinical Note
Brody Herr was seen and treated in our emergency department on 6/27/2022  No restrictions            Diagnosis:     Martinez Justice  may return to work on return date  She may return on this date: 06/29/2022         If you have any questions or concerns, please don't hesitate to call        Saji Baldwin, DO    ______________________________           _______________          _______________  Hospital Representative                              Date                                Time

## 2022-06-27 NOTE — Clinical Note
Nadaytonflash Plascencia was seen and treated in our emergency department on 6/27/2022  No restrictions            Diagnosis:     Anna Harry  may return to work on return date  She may return on this date: 06/29/2022         If you have any questions or concerns, please don't hesitate to call        Nathan Retana DO    ______________________________           _______________          _______________  Hospital Representative                              Date                                Time

## 2022-07-06 ENCOUNTER — OFFICE VISIT (OUTPATIENT)
Dept: OBGYN CLINIC | Facility: CLINIC | Age: 22
End: 2022-07-06
Payer: COMMERCIAL

## 2022-07-06 VITALS — SYSTOLIC BLOOD PRESSURE: 110 MMHG | DIASTOLIC BLOOD PRESSURE: 72 MMHG | BODY MASS INDEX: 36.38 KG/M2 | WEIGHT: 234 LBS

## 2022-07-06 DIAGNOSIS — Z11.3 SCREEN FOR STD (SEXUALLY TRANSMITTED DISEASE): Primary | ICD-10-CM

## 2022-07-06 DIAGNOSIS — N83.201 CYST OF RIGHT OVARY: ICD-10-CM

## 2022-07-06 LAB
EXTERNAL HIV CONFIRMATION: NORMAL
EXTERNAL HIV SCREEN: NORMAL

## 2022-07-06 PROCEDURE — 87491 CHLMYD TRACH DNA AMP PROBE: CPT | Performed by: PHYSICIAN ASSISTANT

## 2022-07-06 PROCEDURE — 87591 N.GONORRHOEAE DNA AMP PROB: CPT | Performed by: PHYSICIAN ASSISTANT

## 2022-07-06 PROCEDURE — 99213 OFFICE O/P EST LOW 20 MIN: CPT | Performed by: PHYSICIAN ASSISTANT

## 2022-07-06 RX ORDER — MELOXICAM 15 MG/1
15 TABLET ORAL DAILY
Qty: 30 TABLET | Refills: 3 | Status: SHIPPED | OUTPATIENT
Start: 2022-07-06 | End: 2022-10-26

## 2022-07-06 NOTE — PROGRESS NOTES
[place]  2000    S:  25 y o  female here for a problem visit  She complains of abdominal pain, pelvic cramping, and irregular menses    She was seen in the ER 6/27/22 and was found to have a 4cm right ovarian simple cyst   She was told to follow up with us  She had a previous cyst in March  She started Junel Fe 1/20 on 4/25/22 to control ovarian cysts - we discussed that this can take at least 3 months to regulate hormones nicely  She did get her bleed a week early this month  She has not been sexually active since April 2022  She has had no recent STD testing  Ibuprofen helps with her cramping but she is now having GI issues  She had some labs run today by GI  Past Medical History:   Diagnosis Date    Exercise-induced asthma      Family History   Problem Relation Age of Onset    Heart disease Maternal Grandfather      Social History     Socioeconomic History    Marital status: Single     Spouse name: None    Number of children: None    Years of education: None    Highest education level: None   Occupational History    None   Tobacco Use    Smoking status: Never Smoker    Smokeless tobacco: Never Used   Vaping Use    Vaping Use: Never used   Substance and Sexual Activity    Alcohol use: Yes     Comment: social     Drug use: Never    Sexual activity: Not Currently     Partners: Male     Birth control/protection: Condom   Other Topics Concern    None   Social History Narrative    Drinks one to two cups of coffee a day      Social Determinants of Health     Financial Resource Strain: Not on file   Food Insecurity: Not on file   Transportation Needs: Not on file   Physical Activity: Not on file   Stress: Not on file   Social Connections: Not on file   Intimate Partner Violence: Not on file   Housing Stability: Not on file       Review of Systems   Respiratory: Negative  Cardiovascular: Negative  Gastrointestinal: Negative for constipation and diarrhea  Genitourinary: Negative for difficulty urinating, pelvic pain, vaginal bleeding, vaginal discharge, itching or odor  O:  /72 (BP Location: Right arm, Patient Position: Sitting, Cuff Size: Standard)   Wt 106 kg (234 lb)   LMP 07/03/2022 (Exact Date)   BMI 36 38 kg/m²   She appears well and is in no distress  Abdomen is soft and nontender  External genitals are normal without lesions or rashes  Vagina with menses present  Cervix is normal, no lesions or discharge  Uterus is nontender, no masses  Adnexa are nontender, no pelvic masses appreciated    A/P: Ovarian cyst  Discussed heating pad, NSAIDs (will change to Mobic)   Call with increase in pain or if not resolved in 1-2 weeks   STD screening - GC/chlamydia sent today

## 2022-07-08 LAB
C TRACH DNA SPEC QL NAA+PROBE: NEGATIVE
N GONORRHOEA DNA SPEC QL NAA+PROBE: NEGATIVE

## 2022-07-24 ENCOUNTER — HOSPITAL ENCOUNTER (EMERGENCY)
Facility: HOSPITAL | Age: 22
Discharge: HOME | End: 2022-07-24
Attending: EMERGENCY MEDICINE | Admitting: EMERGENCY MEDICINE
Payer: COMMERCIAL

## 2022-07-24 ENCOUNTER — APPOINTMENT (EMERGENCY)
Dept: RADIOLOGY | Facility: HOSPITAL | Age: 22
End: 2022-07-24
Payer: COMMERCIAL

## 2022-07-24 VITALS
TEMPERATURE: 97.9 F | OXYGEN SATURATION: 95 % | DIASTOLIC BLOOD PRESSURE: 101 MMHG | SYSTOLIC BLOOD PRESSURE: 161 MMHG | RESPIRATION RATE: 16 BRPM | HEART RATE: 64 BPM

## 2022-07-24 DIAGNOSIS — N20.0 KIDNEY STONE ON LEFT SIDE: Primary | ICD-10-CM

## 2022-07-24 DIAGNOSIS — N13.30 HYDRONEPHROSIS OF LEFT KIDNEY: ICD-10-CM

## 2022-07-24 LAB
ALBUMIN SERPL BCP-MCNC: 3.8 G/DL (ref 3.5–5)
ALP SERPL-CCNC: 68 U/L (ref 46–384)
ALT SERPL W P-5'-P-CCNC: 9 U/L (ref 12–78)
AMORPH URATE CRY URNS QL MICRO: ABNORMAL
ANION GAP SERPL CALCULATED.3IONS-SCNC: 7 MMOL/L (ref 4–13)
AST SERPL W P-5'-P-CCNC: 11 U/L (ref 5–45)
BACTERIA UR QL AUTO: ABNORMAL /HPF
BASOPHILS # BLD AUTO: 0.06 THOUSANDS/ΜL (ref 0–0.1)
BASOPHILS NFR BLD AUTO: 1 % (ref 0–1)
BILIRUB SERPL-MCNC: 0.29 MG/DL (ref 0.2–1)
BILIRUB UR QL STRIP: NEGATIVE
BUN SERPL-MCNC: 8 MG/DL (ref 5–25)
CALCIUM SERPL-MCNC: 9.5 MG/DL (ref 8.3–10.1)
CHLORIDE SERPL-SCNC: 108 MMOL/L (ref 96–108)
CLARITY UR: CLEAR
CLARITY, POC: CLEAR
CO2 SERPL-SCNC: 23 MMOL/L (ref 21–32)
COLOR UR: YELLOW
COLOR, POC: YELLOW
CREAT SERPL-MCNC: 0.79 MG/DL (ref 0.6–1.3)
EOSINOPHIL # BLD AUTO: 0.32 THOUSAND/ΜL (ref 0–0.61)
EOSINOPHIL NFR BLD AUTO: 4 % (ref 0–6)
ERYTHROCYTE [DISTWIDTH] IN BLOOD BY AUTOMATED COUNT: 12.3 % (ref 11.6–15.1)
EXT PREG TEST URINE: NEGATIVE
EXT. CONTROL ED NAV: NORMAL
GFR SERPL CREATININE-BSD FRML MDRD: 108 ML/MIN/1.73SQ M
GLUCOSE SERPL-MCNC: 99 MG/DL (ref 65–140)
GLUCOSE UR STRIP-MCNC: NEGATIVE MG/DL
HCT VFR BLD AUTO: 41.4 % (ref 34.8–46.1)
HGB BLD-MCNC: 13.7 G/DL (ref 11.5–15.4)
HGB UR QL STRIP.AUTO: ABNORMAL
IMM GRANULOCYTES # BLD AUTO: 0.04 THOUSAND/UL (ref 0–0.2)
IMM GRANULOCYTES NFR BLD AUTO: 1 % (ref 0–2)
KETONES UR STRIP-MCNC: NEGATIVE MG/DL
LEUKOCYTE ESTERASE UR QL STRIP: NEGATIVE
LIPASE SERPL-CCNC: 86 U/L (ref 73–393)
LYMPHOCYTES # BLD AUTO: 1.85 THOUSANDS/ΜL (ref 0.6–4.47)
LYMPHOCYTES NFR BLD AUTO: 22 % (ref 14–44)
MCH RBC QN AUTO: 29.4 PG (ref 26.8–34.3)
MCHC RBC AUTO-ENTMCNC: 33.1 G/DL (ref 31.4–37.4)
MCV RBC AUTO: 89 FL (ref 82–98)
MONOCYTES # BLD AUTO: 0.51 THOUSAND/ΜL (ref 0.17–1.22)
MONOCYTES NFR BLD AUTO: 6 % (ref 4–12)
MUCOUS THREADS UR QL AUTO: ABNORMAL
NEUTROPHILS # BLD AUTO: 5.79 THOUSANDS/ΜL (ref 1.85–7.62)
NEUTS SEG NFR BLD AUTO: 66 % (ref 43–75)
NITRITE UR QL STRIP: NEGATIVE
NON-SQ EPI CELLS URNS QL MICRO: ABNORMAL /HPF
NRBC BLD AUTO-RTO: 0 /100 WBCS
PH UR STRIP.AUTO: 7 [PH] (ref 4.5–8)
PLATELET # BLD AUTO: 373 THOUSANDS/UL (ref 149–390)
PMV BLD AUTO: 10.3 FL (ref 8.9–12.7)
POTASSIUM SERPL-SCNC: 3.8 MMOL/L (ref 3.5–5.3)
PROT SERPL-MCNC: 7.9 G/DL (ref 6.4–8.4)
PROT UR STRIP-MCNC: ABNORMAL MG/DL
RBC # BLD AUTO: 4.66 MILLION/UL (ref 3.81–5.12)
RBC #/AREA URNS AUTO: ABNORMAL /HPF
SODIUM SERPL-SCNC: 138 MMOL/L (ref 135–147)
SP GR UR STRIP.AUTO: 1.02 (ref 1–1.03)
UROBILINOGEN UR QL STRIP.AUTO: 0.2 E.U./DL
WBC # BLD AUTO: 8.57 THOUSAND/UL (ref 4.31–10.16)
WBC #/AREA URNS AUTO: ABNORMAL /HPF

## 2022-07-24 PROCEDURE — 81001 URINALYSIS AUTO W/SCOPE: CPT

## 2022-07-24 PROCEDURE — 81025 URINE PREGNANCY TEST: CPT | Performed by: EMERGENCY MEDICINE

## 2022-07-24 PROCEDURE — G1004 CDSM NDSC: HCPCS

## 2022-07-24 PROCEDURE — 36415 COLL VENOUS BLD VENIPUNCTURE: CPT | Performed by: EMERGENCY MEDICINE

## 2022-07-24 PROCEDURE — 83690 ASSAY OF LIPASE: CPT | Performed by: EMERGENCY MEDICINE

## 2022-07-24 PROCEDURE — 99285 EMERGENCY DEPT VISIT HI MDM: CPT | Performed by: EMERGENCY MEDICINE

## 2022-07-24 PROCEDURE — 85025 COMPLETE CBC W/AUTO DIFF WBC: CPT | Performed by: EMERGENCY MEDICINE

## 2022-07-24 PROCEDURE — 96374 THER/PROPH/DIAG INJ IV PUSH: CPT

## 2022-07-24 PROCEDURE — 74176 CT ABD & PELVIS W/O CONTRAST: CPT

## 2022-07-24 PROCEDURE — 96375 TX/PRO/DX INJ NEW DRUG ADDON: CPT

## 2022-07-24 PROCEDURE — 80053 COMPREHEN METABOLIC PANEL: CPT | Performed by: EMERGENCY MEDICINE

## 2022-07-24 PROCEDURE — 99284 EMERGENCY DEPT VISIT MOD MDM: CPT

## 2022-07-24 PROCEDURE — 96361 HYDRATE IV INFUSION ADD-ON: CPT

## 2022-07-24 PROCEDURE — 96376 TX/PRO/DX INJ SAME DRUG ADON: CPT

## 2022-07-24 RX ORDER — ACETAMINOPHEN 325 MG/1
650 TABLET ORAL ONCE
Status: DISCONTINUED | OUTPATIENT
Start: 2022-07-24 | End: 2022-07-24 | Stop reason: HOSPADM

## 2022-07-24 RX ORDER — HYDROMORPHONE HCL/PF 1 MG/ML
0.5 SYRINGE (ML) INJECTION ONCE
Status: COMPLETED | OUTPATIENT
Start: 2022-07-24 | End: 2022-07-24

## 2022-07-24 RX ORDER — ONDANSETRON 2 MG/ML
4 INJECTION INTRAMUSCULAR; INTRAVENOUS ONCE
Status: COMPLETED | OUTPATIENT
Start: 2022-07-24 | End: 2022-07-24

## 2022-07-24 RX ORDER — OXYCODONE HYDROCHLORIDE 5 MG/1
5 TABLET ORAL EVERY 4 HOURS PRN
Qty: 12 TABLET | Refills: 0 | Status: SHIPPED | OUTPATIENT
Start: 2022-07-24 | End: 2022-10-26

## 2022-07-24 RX ORDER — HYDROMORPHONE HCL/PF 1 MG/ML
1 SYRINGE (ML) INJECTION ONCE
Status: COMPLETED | OUTPATIENT
Start: 2022-07-24 | End: 2022-07-24

## 2022-07-24 RX ADMIN — HYDROMORPHONE HYDROCHLORIDE 1 MG: 1 INJECTION, SOLUTION INTRAMUSCULAR; INTRAVENOUS; SUBCUTANEOUS at 10:43

## 2022-07-24 RX ADMIN — SODIUM CHLORIDE 1000 ML: 0.9 INJECTION, SOLUTION INTRAVENOUS at 09:54

## 2022-07-24 RX ADMIN — ONDANSETRON HYDROCHLORIDE 4 MG: 2 INJECTION, SOLUTION INTRAMUSCULAR; INTRAVENOUS at 09:54

## 2022-07-24 RX ADMIN — HYDROMORPHONE HYDROCHLORIDE 0.5 MG: 1 INJECTION, SOLUTION INTRAMUSCULAR; INTRAVENOUS; SUBCUTANEOUS at 09:54

## 2022-07-24 RX ADMIN — ONDANSETRON 4 MG: 2 INJECTION INTRAMUSCULAR; INTRAVENOUS at 10:43

## 2022-07-24 NOTE — DISCHARGE INSTRUCTIONS
Your CT scan showed "Mildly obstructing 1 mm calculus at the left ureterovesical junction  Multiple tiny bilateral intrarenal calculi "     Please follow up with urology, return to the ED if you develop any of the concerning symptoms we discussed  Prescription for oxycodone was sent to your pharmacy, use this as needed for pain  Please use the following pain medications as prescribed:  - Tylenol 650mg every 6 hours  - Motrin 400mg every 6 hours  They work in different ways so can be used together at the same time

## 2022-07-24 NOTE — ED PROVIDER NOTES
History  Chief Complaint   Patient presents with    Flank Pain     Left side, history of kidney stones     HPI    24 yo F, past medical history significant for nephrolithiasis, ovarian cyst, presenting for evaluation of left-sided flank pain  Patient states she woke up this morning with flank pain  Patient has attempted to take Advil without relief of her symptoms  States that this feels similar to symptoms she had in the past when she was found to have nephrolithiasis and ovarian cyst   Is associated with nausea and vomiting  Denies fever, chills, chest pain, shortness of breath, dysuria, urinary frequency, hematuria, vaginal discharge, vaginal bleeding  Denies any prior history of STIs  Prior to Admission Medications   Prescriptions Last Dose Informant Patient Reported? Taking? Multiple Vitamin (Multi-Day) TABS  Self Yes No   Sig: Take 1 tablet by mouth   Probiotic Product (Misc Intestinal Vikki Regulat) CAPS  Self Yes No   Sig: Take by mouth   Symbicort 160-4 5 MCG/ACT inhaler  Self No No   Sig: Inhale 2 puffs 2 (two) times a day Rinse mouth after use  albuterol (PROVENTIL HFA,VENTOLIN HFA) 90 mcg/act inhaler  Self Yes No   ascorbic acid (VITAMIN C) 500 MG tablet  Self Yes No   Sig: Take 500 mg by mouth   fluticasone (FLONASE) 50 mcg/act nasal spray  Self No No   Si spray into each nostril daily   meloxicam (Mobic) 15 mg tablet   No No   Sig: Take 1 tablet (15 mg total) by mouth daily   norethindrone-ethinyl estradiol (Junel ) 1-20 MG-MCG per tablet  Self No No   Sig: Take 1 tablet by mouth daily      Facility-Administered Medications: None       Past Medical History:   Diagnosis Date    Exercise-induced asthma        Past Surgical History:   Procedure Laterality Date    APPENDECTOMY      WISDOM TOOTH EXTRACTION         Family History   Problem Relation Age of Onset    Heart disease Maternal Grandfather      I have reviewed and agree with the history as documented      E-Cigarette/Vaping  E-Cigarette Use Never User      E-Cigarette/Vaping Substances    Nicotine No     THC No     CBD No     Flavoring No     Other No     Unknown No      Social History     Tobacco Use    Smoking status: Never Smoker    Smokeless tobacco: Never Used   Vaping Use    Vaping Use: Never used   Substance Use Topics    Alcohol use: Yes     Comment: social     Drug use: Never        Review of Systems   Constitutional: Negative for chills and fever  HENT: Negative for sore throat  Respiratory: Negative for cough and shortness of breath  Cardiovascular: Negative for chest pain, palpitations and leg swelling  Gastrointestinal: Positive for nausea and vomiting  Negative for abdominal pain and diarrhea  Genitourinary: Positive for flank pain  Negative for dysuria and frequency  Musculoskeletal: Negative for myalgias  Skin: Negative for rash and wound  Neurological: Negative for dizziness, weakness, light-headedness, numbness and headaches  All other systems reviewed and are negative  Physical Exam  ED Triage Vitals [07/24/22 0914]   Temperature Pulse Respirations Blood Pressure SpO2   97 9 °F (36 6 °C) (!) 106 18 (!) 161/101 100 %      Temp Source Heart Rate Source Patient Position - Orthostatic VS BP Location FiO2 (%)   Tympanic Monitor Sitting Right arm --      Pain Score       10 - Worst Possible Pain             Orthostatic Vital Signs  Vitals:    07/24/22 0914 07/24/22 1115   BP: (!) 161/101    Pulse: (!) 106 64   Patient Position - Orthostatic VS: Sitting        Physical Exam  Vitals and nursing note reviewed  Constitutional:       General: She is in acute distress (Crying)  Appearance: Normal appearance  She is not ill-appearing or toxic-appearing  HENT:      Head: Normocephalic and atraumatic        Right Ear: External ear normal       Left Ear: External ear normal       Nose: Nose normal       Mouth/Throat:      Mouth: Mucous membranes are moist       Pharynx: Oropharynx is clear    Eyes:      General: No scleral icterus  Extraocular Movements: Extraocular movements intact  Cardiovascular:      Rate and Rhythm: Normal rate and regular rhythm  Pulses: Normal pulses  Pulmonary:      Effort: Pulmonary effort is normal  No respiratory distress  Breath sounds: Normal breath sounds  Abdominal:      Palpations: Abdomen is soft  Tenderness: There is no abdominal tenderness  There is no right CVA tenderness, left CVA tenderness, guarding or rebound  Musculoskeletal:         General: Normal range of motion  Cervical back: Normal range of motion and neck supple  Skin:     General: Skin is warm  Capillary Refill: Capillary refill takes less than 2 seconds  Neurological:      General: No focal deficit present  Mental Status: She is alert and oriented to person, place, and time           ED Medications  Medications   sodium chloride 0 9 % bolus 1,000 mL (0 mL Intravenous Stopped 7/24/22 1210)   ondansetron (ZOFRAN) injection 4 mg (4 mg Intravenous Given 7/24/22 0954)   HYDROmorphone (DILAUDID) injection 0 5 mg (0 5 mg Intravenous Given 7/24/22 0954)   ondansetron (ZOFRAN) injection 4 mg (4 mg Intravenous Given 7/24/22 1043)   HYDROmorphone (DILAUDID) injection 1 mg (1 mg Intravenous Given 7/24/22 1043)       Diagnostic Studies  Results Reviewed     Procedure Component Value Units Date/Time    Comprehensive metabolic panel [[id number]]  (Abnormal) Collected: 07/24/22 0949    Lab Status: Final result Specimen: Blood from Arm, Right Updated: 07/24/22 1021     Sodium 138 mmol/L      Potassium 3 8 mmol/L      Chloride 108 mmol/L      CO2 23 mmol/L      ANION GAP 7 mmol/L      BUN 8 mg/dL      Creatinine 0 79 mg/dL      Glucose 99 mg/dL      Calcium 9 5 mg/dL      AST 11 U/L      ALT 9 U/L      Alkaline Phosphatase 68 U/L      Total Protein 7 9 g/dL      Albumin 3 8 g/dL      Total Bilirubin 0 29 mg/dL      eGFR 108 ml/min/1 73sq m     Narrative:      [facility] [employer] guidelines for Chronic Kidney Disease (CKD):     Stage 1 with normal or high GFR (GFR > 90 mL/min/1 73 square meters)    Stage 2 Mild CKD (GFR = 60-89 mL/min/1 73 square meters)    Stage 3A Moderate CKD (GFR = 45-59 mL/min/1 73 square meters)    Stage 3B Moderate CKD (GFR = 30-44 mL/min/1 73 square meters)    Stage 4 Severe CKD (GFR = 15-29 mL/min/1 73 square meters)    Stage 5 End Stage CKD (GFR <15 mL/min/1 73 square meters)  Note: GFR calculation is accurate only with a steady state creatinine    Lipase [[id number]]  (Normal) Collected: 07/24/22 0949    Lab Status: Final result Specimen: Blood from Arm, Right Updated: 07/24/22 1021     Lipase 86 u/L     Urine Microscopic [[id number]]  (Abnormal) Collected: 07/24/22 0956    Lab Status: Final result Specimen: Urine, Clean Catch Updated: 07/24/22 1017     RBC, UA Innumerable /hpf      WBC, UA 4-10 /hpf      Epithelial Cells Occasional /hpf      Bacteria, UA Occasional /hpf      MUCUS THREADS Moderate     Amorphous Crystals, UA Occasional    CBC and differential [[id number]] Collected: 07/24/22 0949    Lab Status: Final result Specimen: Blood from Arm, Right Updated: 07/24/22 1001     WBC 8 57 Thousand/uL      RBC 4 66 Million/uL      Hemoglobin 13 7 g/dL      Hematocrit 41 4 %      MCV 89 fL      MCH 29 4 pg      MCHC 33 1 g/dL      RDW 12 3 %      MPV 10 3 fL      Platelets 555 Thousands/uL      nRBC 0 /100 WBCs      Neutrophils Relative 66 %      Immat GRANS % 1 %      Lymphocytes Relative 22 %      Monocytes Relative 6 %      Eosinophils Relative 4 %      Basophils Relative 1 %      Neutrophils Absolute 5 79 Thousands/µL      Immature Grans Absolute 0 04 Thousand/uL      Lymphocytes Absolute 1 85 Thousands/µL      Monocytes Absolute 0 51 Thousand/µL      Eosinophils Absolute 0 32 Thousand/µL      Basophils Absolute 0 06 Thousands/µL     POCT pregnancy, urine [[id number]]  (Normal) Resulted: 07/24/22 0959    Lab Status: Final result Updated: 07/24/22 0959     EXT PREG TEST UR (Ref: Negative) negative     Control valid    POCT urinalysis dipstick [[id number]]  (Normal) Resulted: 07/24/22 0958    Lab Status: Final result Specimen: Urine Updated: 07/24/22 0958     Color, UA yellow     Clarity, UA clear    Urine Macroscopic, POC [[id number]]  (Abnormal) Collected: 07/24/22 0956    Lab Status: Final result Specimen: Urine Updated: 07/24/22 0957     Color, UA Yellow     Clarity, UA Clear     pH, UA 7 0     Leukocytes, UA Negative     Nitrite, UA Negative     Protein, UA Trace mg/dl      Glucose, UA Negative mg/dl      Ketones, UA Negative mg/dl      Urobilinogen, UA 0 2 E U /dl      Bilirubin, UA Negative     Occult Blood, UA Large     Specific Gravity, UA 1 025    Narrative:      CLINITEK RESULT                 CT abdomen pelvis wo contrast   Final Result by Mahin Salomon MD (07/24 1038)      Mildly obstructing 1 mm calculus at the left ureterovesical junction  Multiple tiny bilateral intrarenal calculi  Workstation performed: [id number]               Procedures  Procedures      ED Course  ED Course as of 07/24/22 1459   Sun Jul 24, 2022   1010 PREGNANCY TEST URINE: negative         CRAFFT    Flowsheet Row Most Recent Value   SBIRT (13-23 yo)    In order to provide better care to our patients, we are screening all of our patients for alcohol and drug use  Would it be okay to ask you these screening questions? No Filed at: 07/24/2022 3779                                    MDM  Number of Diagnoses or Management Options  Hydronephrosis of left kidney  Kidney stone on left side  Diagnosis management comments: 61-year-old female, past medical history significant for prior nephrolithiasis, ovarian cyst, presenting for evaluation of left-sided flank pain  On exam the patient is very uncomfortable appearing but has no abdominal tenderness, no CVA tenderness    My differential diagnosis includes but is not limited to nephrolithiasis, ovarian cyst, ovarian torsion, colitis, pyelonephritis  Plan:  CBC, CMP, lipase, UA, urine preg, CT abdomen pelvis, IV fluids, pain control  Patient's evaluation was significant for 1 mm mildly obstructing renal stone  Patient's other lab work does not show any signs of an CONSUELO, infection  Patient was re-evaluated and reports symptomatic improvement  Patient to be discharged with close Urology follow-up, pain control, strict return to ED precautions  I reviewed all testing with the patient:  See above  I gave oral return precautions for what to return for in addition to the written return precautions  The patient (and any family present:  Friend) verbalized understanding of the discharge instructions and warnings that would necessitate return to the Emergency Department  I specifically highlighted areas of special concern regarding the written and verbal discharge instructions and return precautions  All questions were answered prior to discharge  Disposition  Final diagnoses:   Kidney stone on left side   Hydronephrosis of left kidney     Time reflects when diagnosis was documented in both MDM as applicable and the Disposition within this note     Time User Action Codes Description Comment    7/24/2022 12:03 PM Rashad Sandra Add [N20 0] Kidney stone on left side     7/24/2022 12:03 PM Rashad Pep Add [N13 30] Hydronephrosis of left kidney     7/24/2022 12:04 PM Scott Patel Add [N20 0] Nephrolithiasis     7/24/2022 12:04 PM Cheko Coats [N20 0] Nephrolithiasis       ED Disposition     ED Disposition   Discharge    Condition   Stable    Date/Time   Sun Jul 24, 2022 12:04 PM    Comment   Alysha Elders discharge to home/self care                 Follow-up Information     Follow up With Specialties Details Why Contact Info Additional [address] [facility]  For Emergency Department Follow-up [address] [facility] [place], [address] [place], [place], [place], [address]    [facility] Emergency Department Emergency Medicine  If symptoms worsen Mame 10 [phone]  [address] Emergency Department, [address] [facility], [place], [place], [address]          Discharge Medication List as of 7/24/2022 12:05 PM      START taking these medications    Details   oxyCODONE (Roxicodone) 5 immediate release tablet Take 1 tablet (5 mg total) by mouth every 4 (four) hours as needed for moderate pain for up to 12 doses Max Daily Amount: 30 mg, Starting Sun 7/24/2022, Normal         CONTINUE these medications which have NOT CHANGED    Details   albuterol (PROVENTIL HFA,VENTOLIN HFA) 90 mcg/act inhaler Starting Fri 7/23/2021, Historical Med      ascorbic acid (VITAMIN C) 500 MG tablet Take 500 mg by mouth, Historical Med      fluticasone (FLONASE) 50 mcg/act nasal spray 1 spray into each nostril daily, Starting Tue 3/22/2022, Normal      meloxicam (Mobic) 15 mg tablet Take 1 tablet (15 mg total) by mouth daily, Starting Wed 7/6/2022, Normal      Multiple Vitamin (Multi-Day) TABS Take 1 tablet by mouth, Historical Med      norethindrone-ethinyl estradiol (Junel 1/20) 1-20 MG-MCG per tablet Take 1 tablet by mouth daily, Starting Tue 4/26/2022, Normal      Probiotic Product (Misc Intestinal Vikki Regulat) CAPS Take by mouth, Historical Med      Symbicort 160-4 5 MCG/ACT inhaler Inhale 2 puffs 2 (two) times a day Rinse mouth after use , Starting Mon 6/6/2022, Normal               PDMP Review     None           ED Provider  Attending physically available and evaluated Ashley Leon I managed the patient along with the ED Attending      Electronically Signed by         Artie Mane DO  07/24/22 8359

## 2022-08-12 ENCOUNTER — TELEPHONE (OUTPATIENT)
Dept: PULMONOLOGY | Facility: CLINIC | Age: 22
End: 2022-08-12

## 2022-09-01 ENCOUNTER — OFFICE VISIT (OUTPATIENT)
Dept: PULMONOLOGY | Facility: CLINIC | Age: 22
End: 2022-09-01
Payer: COMMERCIAL

## 2022-09-01 VITALS
HEIGHT: 70 IN | RESPIRATION RATE: 16 BRPM | TEMPERATURE: 97.5 F | OXYGEN SATURATION: 98 % | DIASTOLIC BLOOD PRESSURE: 80 MMHG | WEIGHT: 220 LBS | SYSTOLIC BLOOD PRESSURE: 118 MMHG | BODY MASS INDEX: 31.5 KG/M2 | HEART RATE: 80 BPM

## 2022-09-01 DIAGNOSIS — J30.89 NON-SEASONAL ALLERGIC RHINITIS, UNSPECIFIED TRIGGER: ICD-10-CM

## 2022-09-01 DIAGNOSIS — T78.40XD ALLERGY, SUBSEQUENT ENCOUNTER: ICD-10-CM

## 2022-09-01 DIAGNOSIS — J45.40 MODERATE PERSISTENT ASTHMA WITHOUT COMPLICATION: Primary | ICD-10-CM

## 2022-09-01 PROCEDURE — 99214 OFFICE O/P EST MOD 30 MIN: CPT | Performed by: INTERNAL MEDICINE

## 2022-09-01 RX ORDER — FLUTICASONE PROPIONATE 50 MCG
1 SPRAY, SUSPENSION (ML) NASAL DAILY
Qty: 15.8 ML | Refills: 5 | Status: SHIPPED | OUTPATIENT
Start: 2022-09-01

## 2022-09-01 RX ORDER — BUDESONIDE AND FORMOTEROL FUMARATE DIHYDRATE 160; 4.5 UG/1; UG/1
2 AEROSOL RESPIRATORY (INHALATION) 2 TIMES DAILY
Qty: 30.6 G | Refills: 5 | Status: SHIPPED | OUTPATIENT
Start: 2022-09-01

## 2022-09-01 RX ORDER — ALBUTEROL SULFATE 90 UG/1
2 AEROSOL, METERED RESPIRATORY (INHALATION) EVERY 6 HOURS PRN
Qty: 18 G | Refills: 5 | Status: SHIPPED | OUTPATIENT
Start: 2022-09-01

## 2022-09-01 NOTE — PROGRESS NOTES
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  It is better now  I have maintained her on the Symbicort 160/4 5, 2 inhalations twice a day  She will use the albuterol rescue inhaler 2 inhalations 4 times a day as needed  Her allergic rhinitis is fairly treated  I have maintained her on the fluticasone nasal spray 2 sprays to each nostril once a day  I talked her about her weight and try to bring her BMI below 30  I have renewed her prescriptions today  I will see her in 6 months in a follow-up visit  Subjective: The patient is here for a follow-up visit  She was having difficulty during the marching band practice is because of the heat and humidity  During those days she used the albuterol once a day and sometimes twice  She is on Symbicort 160/4 5, 2 inhalations twice a day  She denies nocturnal symptoms  She has occasional nasal congestion  She is using fluticasone nasal spray 2 sprays to each nostril once a day  She has occasional heartburn specially when she eats spicy food  Review of systems:  A 12 point system review is done and aside from what is stated above the rest of the review of systems is negative  Family history and social history are reviewed  Medications list is reviewed  Vitals: Blood pressure 118/80, pulse 80, temperature 97 5 °F (36 4 °C), temperature source Tympanic, resp   rate 16, height 5' 10" (1 778 m), weight 99 8 kg (220 lb), SpO2 98 % ,     Physical Exam  Gen: Awake, alert, oriented x 3, no acute distress  HEENT: Mucous membranes moist, no oral lesions, no thrush  NECK: No accessory muscle use, JVP not elevated  Cardiac: Regular, single S1, single S2, no murmurs, no rubs, no gallops  Lungs:  Clear breath sounds  No wheezing or rhonchi  Abdomen: normoactive bowel sounds, soft nontender, nondistended, no rebound or rigidity, no guarding  Extremities: no cyanosis, no clubbing, no edema  Neuro:  Grossly nonfocal     Lab Results   Component Value Date    WBC 8 57 07/24/2022    HGB 13 7 07/24/2022    HCT 41 4 07/24/2022    MCV 89 07/24/2022     07/24/2022     Lab Results   Component Value Date    SODIUM 138 07/24/2022    K 3 8 07/24/2022     07/24/2022    CO2 23 07/24/2022    BUN 8 07/24/2022    CREATININE 0 79 07/24/2022    GLUC 99 07/24/2022    CALCIUM 9 5 07/24/2022       Skin:  No rash

## 2022-09-29 ENCOUNTER — APPOINTMENT (EMERGENCY)
Dept: RADIOLOGY | Facility: HOSPITAL | Age: 22
End: 2022-09-29
Payer: COMMERCIAL

## 2022-09-29 ENCOUNTER — HOSPITAL ENCOUNTER (EMERGENCY)
Facility: HOSPITAL | Age: 22
Discharge: HOME | End: 2022-09-29
Attending: EMERGENCY MEDICINE
Payer: COMMERCIAL

## 2022-09-29 VITALS
SYSTOLIC BLOOD PRESSURE: 111 MMHG | TEMPERATURE: 101.4 F | HEART RATE: 103 BPM | RESPIRATION RATE: 20 BRPM | OXYGEN SATURATION: 98 % | DIASTOLIC BLOOD PRESSURE: 51 MMHG

## 2022-09-29 VITALS
TEMPERATURE: 98.3 F | SYSTOLIC BLOOD PRESSURE: 132 MMHG | OXYGEN SATURATION: 98 % | DIASTOLIC BLOOD PRESSURE: 72 MMHG | RESPIRATION RATE: 22 BRPM | HEART RATE: 122 BPM

## 2022-09-29 DIAGNOSIS — R10.9 ABDOMINAL PAIN: ICD-10-CM

## 2022-09-29 DIAGNOSIS — J02.9 PHARYNGITIS: ICD-10-CM

## 2022-09-29 DIAGNOSIS — R50.9 FEVER: ICD-10-CM

## 2022-09-29 DIAGNOSIS — N12 PYELONEPHRITIS: Primary | ICD-10-CM

## 2022-09-29 DIAGNOSIS — R11.2 NAUSEA AND VOMITING: ICD-10-CM

## 2022-09-29 DIAGNOSIS — R10.9 RIGHT FLANK PAIN: Primary | ICD-10-CM

## 2022-09-29 DIAGNOSIS — R10.9 FLANK PAIN: ICD-10-CM

## 2022-09-29 DIAGNOSIS — J02.9 SORE THROAT: ICD-10-CM

## 2022-09-29 LAB
ALBUMIN SERPL BCP-MCNC: 4 G/DL (ref 3.5–5)
ALP SERPL-CCNC: 80 U/L (ref 46–116)
ALT SERPL W P-5'-P-CCNC: 12 U/L (ref 12–78)
ANION GAP SERPL CALCULATED.3IONS-SCNC: 5 MMOL/L (ref 4–13)
ANION GAP SERPL CALCULATED.3IONS-SCNC: 7 MMOL/L (ref 4–13)
APTT PPP: 29 SECONDS (ref 23–37)
AST SERPL W P-5'-P-CCNC: 8 U/L (ref 5–45)
BACTERIA UR QL AUTO: ABNORMAL /HPF
BACTERIA UR QL AUTO: ABNORMAL /HPF
BASOPHILS # BLD AUTO: 0.06 THOUSANDS/ΜL (ref 0–0.1)
BASOPHILS # BLD AUTO: 0.07 THOUSANDS/ΜL (ref 0–0.1)
BASOPHILS NFR BLD AUTO: 0 % (ref 0–1)
BASOPHILS NFR BLD AUTO: 0 % (ref 0–1)
BILIRUB SERPL-MCNC: 0.41 MG/DL (ref 0.2–1)
BILIRUB UR QL STRIP: NEGATIVE
BILIRUB UR QL STRIP: NEGATIVE
BUN SERPL-MCNC: 6 MG/DL (ref 5–25)
BUN SERPL-MCNC: 8 MG/DL (ref 5–25)
CALCIUM SERPL-MCNC: 8.7 MG/DL (ref 8.3–10.1)
CALCIUM SERPL-MCNC: 9.4 MG/DL (ref 8.3–10.1)
CHLORIDE SERPL-SCNC: 103 MMOL/L (ref 96–108)
CHLORIDE SERPL-SCNC: 110 MMOL/L (ref 96–108)
CLARITY UR: CLEAR
CLARITY UR: CLEAR
CO2 SERPL-SCNC: 21 MMOL/L (ref 21–32)
CO2 SERPL-SCNC: 26 MMOL/L (ref 21–32)
COLOR UR: YELLOW
COLOR UR: YELLOW
CREAT SERPL-MCNC: 0.75 MG/DL (ref 0.6–1.3)
CREAT SERPL-MCNC: 0.76 MG/DL (ref 0.6–1.3)
EOSINOPHIL # BLD AUTO: 0.01 THOUSAND/ΜL (ref 0–0.61)
EOSINOPHIL # BLD AUTO: 0.12 THOUSAND/ΜL (ref 0–0.61)
EOSINOPHIL NFR BLD AUTO: 0 % (ref 0–6)
EOSINOPHIL NFR BLD AUTO: 1 % (ref 0–6)
ERYTHROCYTE [DISTWIDTH] IN BLOOD BY AUTOMATED COUNT: 12.5 % (ref 11.6–15.1)
ERYTHROCYTE [DISTWIDTH] IN BLOOD BY AUTOMATED COUNT: 12.5 % (ref 11.6–15.1)
EXT PREG TEST URINE: NORMAL
EXT. CONTROL ED NAV: NORMAL
FLUAV RNA RESP QL NAA+PROBE: NEGATIVE
FLUBV RNA RESP QL NAA+PROBE: NEGATIVE
GFR SERPL CREATININE-BSD FRML MDRD: 113 ML/MIN/1.73SQ M
GFR SERPL CREATININE-BSD FRML MDRD: 115 ML/MIN/1.73SQ M
GLUCOSE SERPL-MCNC: 104 MG/DL (ref 65–140)
GLUCOSE SERPL-MCNC: 95 MG/DL (ref 65–140)
GLUCOSE UR STRIP-MCNC: NEGATIVE MG/DL
GLUCOSE UR STRIP-MCNC: NEGATIVE MG/DL
HCT VFR BLD AUTO: 42.6 % (ref 34.8–46.1)
HCT VFR BLD AUTO: 42.9 % (ref 34.8–46.1)
HGB BLD-MCNC: 13.8 G/DL (ref 11.5–15.4)
HGB BLD-MCNC: 14.2 G/DL (ref 11.5–15.4)
HGB UR QL STRIP.AUTO: ABNORMAL
HGB UR QL STRIP.AUTO: NEGATIVE
IMM GRANULOCYTES # BLD AUTO: 0.08 THOUSAND/UL (ref 0–0.2)
IMM GRANULOCYTES # BLD AUTO: 0.09 THOUSAND/UL (ref 0–0.2)
IMM GRANULOCYTES NFR BLD AUTO: 1 % (ref 0–2)
IMM GRANULOCYTES NFR BLD AUTO: 1 % (ref 0–2)
INR PPP: 1.05 (ref 0.84–1.19)
KETONES UR STRIP-MCNC: ABNORMAL MG/DL
KETONES UR STRIP-MCNC: ABNORMAL MG/DL
LACTATE SERPL-SCNC: 1.1 MMOL/L (ref 0.5–2)
LEUKOCYTE ESTERASE UR QL STRIP: ABNORMAL
LEUKOCYTE ESTERASE UR QL STRIP: NEGATIVE
LIPASE SERPL-CCNC: 101 U/L (ref 73–393)
LYMPHOCYTES # BLD AUTO: 1.1 THOUSANDS/ΜL (ref 0.6–4.47)
LYMPHOCYTES # BLD AUTO: 1.41 THOUSANDS/ΜL (ref 0.6–4.47)
LYMPHOCYTES NFR BLD AUTO: 7 % (ref 14–44)
LYMPHOCYTES NFR BLD AUTO: 9 % (ref 14–44)
MCH RBC QN AUTO: 29.2 PG (ref 26.8–34.3)
MCH RBC QN AUTO: 30.1 PG (ref 26.8–34.3)
MCHC RBC AUTO-ENTMCNC: 32.4 G/DL (ref 31.4–37.4)
MCHC RBC AUTO-ENTMCNC: 33.1 G/DL (ref 31.4–37.4)
MCV RBC AUTO: 90 FL (ref 82–98)
MCV RBC AUTO: 91 FL (ref 82–98)
MONOCYTES # BLD AUTO: 1.03 THOUSAND/ΜL (ref 0.17–1.22)
MONOCYTES # BLD AUTO: 1.17 THOUSAND/ΜL (ref 0.17–1.22)
MONOCYTES NFR BLD AUTO: 6 % (ref 4–12)
MONOCYTES NFR BLD AUTO: 8 % (ref 4–12)
NEUTROPHILS # BLD AUTO: 12.81 THOUSANDS/ΜL (ref 1.85–7.62)
NEUTROPHILS # BLD AUTO: 13.96 THOUSANDS/ΜL (ref 1.85–7.62)
NEUTS SEG NFR BLD AUTO: 83 % (ref 43–75)
NEUTS SEG NFR BLD AUTO: 84 % (ref 43–75)
NITRITE UR QL STRIP: NEGATIVE
NITRITE UR QL STRIP: NEGATIVE
NON-SQ EPI CELLS URNS QL MICRO: ABNORMAL /HPF
NON-SQ EPI CELLS URNS QL MICRO: ABNORMAL /HPF
NRBC BLD AUTO-RTO: 0 /100 WBCS
NRBC BLD AUTO-RTO: 0 /100 WBCS
PH UR STRIP.AUTO: 7 [PH] (ref 4.5–8)
PH UR STRIP.AUTO: 7 [PH] (ref 4.5–8)
PLATELET # BLD AUTO: 287 THOUSANDS/UL (ref 149–390)
PLATELET # BLD AUTO: 352 THOUSANDS/UL (ref 149–390)
PMV BLD AUTO: 10.6 FL (ref 8.9–12.7)
PMV BLD AUTO: 10.8 FL (ref 8.9–12.7)
POTASSIUM SERPL-SCNC: 3.3 MMOL/L (ref 3.5–5.3)
POTASSIUM SERPL-SCNC: 3.6 MMOL/L (ref 3.5–5.3)
PROCALCITONIN SERPL-MCNC: 0.07 NG/ML
PROT SERPL-MCNC: 8.6 G/DL (ref 6.4–8.4)
PROT UR STRIP-MCNC: ABNORMAL MG/DL
PROT UR STRIP-MCNC: ABNORMAL MG/DL
PROTHROMBIN TIME: 13.9 SECONDS (ref 11.6–14.5)
RBC # BLD AUTO: 4.72 MILLION/UL (ref 3.81–5.12)
RBC # BLD AUTO: 4.73 MILLION/UL (ref 3.81–5.12)
RBC #/AREA URNS AUTO: ABNORMAL /HPF
RBC #/AREA URNS AUTO: ABNORMAL /HPF
RSV RNA RESP QL NAA+PROBE: NEGATIVE
SARS-COV-2 RNA RESP QL NAA+PROBE: NEGATIVE
SODIUM SERPL-SCNC: 134 MMOL/L (ref 135–147)
SODIUM SERPL-SCNC: 138 MMOL/L (ref 135–147)
SP GR UR STRIP.AUTO: 1.01 (ref 1–1.03)
SP GR UR STRIP.AUTO: 1.02 (ref 1–1.03)
UROBILINOGEN UR QL STRIP.AUTO: 0.2 E.U./DL
UROBILINOGEN UR QL STRIP.AUTO: 0.2 E.U./DL
WBC # BLD AUTO: 15.24 THOUSAND/UL (ref 4.31–10.16)
WBC # BLD AUTO: 16.67 THOUSAND/UL (ref 4.31–10.16)
WBC #/AREA URNS AUTO: ABNORMAL /HPF
WBC #/AREA URNS AUTO: ABNORMAL /HPF

## 2022-09-29 PROCEDURE — 36415 COLL VENOUS BLD VENIPUNCTURE: CPT | Performed by: EMERGENCY MEDICINE

## 2022-09-29 PROCEDURE — 96374 THER/PROPH/DIAG INJ IV PUSH: CPT

## 2022-09-29 PROCEDURE — 85025 COMPLETE CBC W/AUTO DIFF WBC: CPT | Performed by: EMERGENCY MEDICINE

## 2022-09-29 PROCEDURE — 83690 ASSAY OF LIPASE: CPT | Performed by: EMERGENCY MEDICINE

## 2022-09-29 PROCEDURE — 81025 URINE PREGNANCY TEST: CPT | Performed by: EMERGENCY MEDICINE

## 2022-09-29 PROCEDURE — 87086 URINE CULTURE/COLONY COUNT: CPT | Performed by: EMERGENCY MEDICINE

## 2022-09-29 PROCEDURE — 96375 TX/PRO/DX INJ NEW DRUG ADDON: CPT

## 2022-09-29 PROCEDURE — 85730 THROMBOPLASTIN TIME PARTIAL: CPT | Performed by: EMERGENCY MEDICINE

## 2022-09-29 PROCEDURE — 70491 CT SOFT TISSUE NECK W/DYE: CPT

## 2022-09-29 PROCEDURE — G1004 CDSM NDSC: HCPCS

## 2022-09-29 PROCEDURE — 96365 THER/PROPH/DIAG IV INF INIT: CPT

## 2022-09-29 PROCEDURE — 80048 BASIC METABOLIC PNL TOTAL CA: CPT | Performed by: EMERGENCY MEDICINE

## 2022-09-29 PROCEDURE — 99284 EMERGENCY DEPT VISIT MOD MDM: CPT

## 2022-09-29 PROCEDURE — 80053 COMPREHEN METABOLIC PANEL: CPT | Performed by: EMERGENCY MEDICINE

## 2022-09-29 PROCEDURE — 96361 HYDRATE IV INFUSION ADD-ON: CPT

## 2022-09-29 PROCEDURE — 81001 URINALYSIS AUTO W/SCOPE: CPT

## 2022-09-29 PROCEDURE — 87040 BLOOD CULTURE FOR BACTERIA: CPT | Performed by: EMERGENCY MEDICINE

## 2022-09-29 PROCEDURE — 84145 PROCALCITONIN (PCT): CPT | Performed by: EMERGENCY MEDICINE

## 2022-09-29 PROCEDURE — 74177 CT ABD & PELVIS W/CONTRAST: CPT

## 2022-09-29 PROCEDURE — 99284 EMERGENCY DEPT VISIT MOD MDM: CPT | Performed by: EMERGENCY MEDICINE

## 2022-09-29 PROCEDURE — 99285 EMERGENCY DEPT VISIT HI MDM: CPT | Performed by: EMERGENCY MEDICINE

## 2022-09-29 PROCEDURE — 85610 PROTHROMBIN TIME: CPT | Performed by: EMERGENCY MEDICINE

## 2022-09-29 PROCEDURE — 0241U HB NFCT DS VIR RESP RNA 4 TRGT: CPT | Performed by: EMERGENCY MEDICINE

## 2022-09-29 PROCEDURE — 83605 ASSAY OF LACTIC ACID: CPT | Performed by: EMERGENCY MEDICINE

## 2022-09-29 RX ORDER — POTASSIUM CHLORIDE 20 MEQ/1
40 TABLET, EXTENDED RELEASE ORAL ONCE
Status: COMPLETED | OUTPATIENT
Start: 2022-09-29 | End: 2022-09-29

## 2022-09-29 RX ORDER — DEXAMETHASONE SODIUM PHOSPHATE 10 MG/ML
10 INJECTION, SOLUTION INTRAMUSCULAR; INTRAVENOUS ONCE
Status: COMPLETED | OUTPATIENT
Start: 2022-09-29 | End: 2022-09-29

## 2022-09-29 RX ORDER — LIDOCAINE HYDROCHLORIDE 20 MG/ML
15 SOLUTION OROPHARYNGEAL ONCE
Status: COMPLETED | OUTPATIENT
Start: 2022-09-29 | End: 2022-09-29

## 2022-09-29 RX ORDER — IBUPROFEN 600 MG/1
600 TABLET ORAL ONCE
Status: COMPLETED | OUTPATIENT
Start: 2022-09-29 | End: 2022-09-29

## 2022-09-29 RX ORDER — IPRATROPIUM BROMIDE AND ALBUTEROL SULFATE 2.5; .5 MG/3ML; MG/3ML
3 SOLUTION RESPIRATORY (INHALATION)
Status: DISCONTINUED | OUTPATIENT
Start: 2022-09-29 | End: 2022-09-29

## 2022-09-29 RX ORDER — ONDANSETRON 2 MG/ML
4 INJECTION INTRAMUSCULAR; INTRAVENOUS ONCE
Status: COMPLETED | OUTPATIENT
Start: 2022-09-29 | End: 2022-09-29

## 2022-09-29 RX ORDER — CEPHALEXIN 500 MG/1
500 CAPSULE ORAL EVERY 6 HOURS SCHEDULED
Qty: 56 CAPSULE | Refills: 0 | Status: SHIPPED | OUTPATIENT
Start: 2022-09-29 | End: 2022-10-13

## 2022-09-29 RX ORDER — MORPHINE SULFATE 4 MG/ML
4 INJECTION, SOLUTION INTRAMUSCULAR; INTRAVENOUS ONCE
Status: COMPLETED | OUTPATIENT
Start: 2022-09-29 | End: 2022-09-29

## 2022-09-29 RX ORDER — CEPHALEXIN 500 MG/1
500 CAPSULE ORAL ONCE
Status: COMPLETED | OUTPATIENT
Start: 2022-09-29 | End: 2022-09-29

## 2022-09-29 RX ORDER — KETOROLAC TROMETHAMINE 30 MG/ML
15 INJECTION, SOLUTION INTRAMUSCULAR; INTRAVENOUS ONCE
Status: COMPLETED | OUTPATIENT
Start: 2022-09-29 | End: 2022-09-29

## 2022-09-29 RX ORDER — ONDANSETRON 4 MG/1
4 TABLET, ORALLY DISINTEGRATING ORAL EVERY 6 HOURS PRN
Qty: 20 TABLET | Refills: 0 | Status: SHIPPED | OUTPATIENT
Start: 2022-09-29 | End: 2022-10-23 | Stop reason: SDUPTHER

## 2022-09-29 RX ADMIN — SODIUM CHLORIDE 1000 ML: 0.9 INJECTION, SOLUTION INTRAVENOUS at 17:47

## 2022-09-29 RX ADMIN — IOHEXOL 100 ML: 350 INJECTION, SOLUTION INTRAVENOUS at 04:38

## 2022-09-29 RX ADMIN — IBUPROFEN 600 MG: 600 TABLET, FILM COATED ORAL at 17:43

## 2022-09-29 RX ADMIN — KETOROLAC TROMETHAMINE 15 MG: 30 INJECTION, SOLUTION INTRAMUSCULAR; INTRAVENOUS at 03:12

## 2022-09-29 RX ADMIN — SODIUM CHLORIDE 1000 ML: 0.9 INJECTION, SOLUTION INTRAVENOUS at 03:15

## 2022-09-29 RX ADMIN — POTASSIUM CHLORIDE 40 MEQ: 1500 TABLET, EXTENDED RELEASE ORAL at 18:46

## 2022-09-29 RX ADMIN — MORPHINE SULFATE 4 MG: 4 INJECTION INTRAVENOUS at 04:25

## 2022-09-29 RX ADMIN — CEPHALEXIN 500 MG: 500 CAPSULE ORAL at 17:43

## 2022-09-29 RX ADMIN — ONDANSETRON 4 MG: 2 INJECTION INTRAMUSCULAR; INTRAVENOUS at 17:48

## 2022-09-29 RX ADMIN — DEXAMETHASONE SODIUM PHOSPHATE 10 MG: 10 INJECTION, SOLUTION INTRAMUSCULAR; INTRAVENOUS at 18:24

## 2022-09-29 RX ADMIN — LIDOCAINE HYDROCHLORIDE 15 ML: 20 SOLUTION ORAL; TOPICAL at 08:53

## 2022-09-29 RX ADMIN — IOHEXOL 100 ML: 350 INJECTION, SOLUTION INTRAVENOUS at 18:33

## 2022-09-29 RX ADMIN — ONDANSETRON 4 MG: 2 INJECTION INTRAMUSCULAR; INTRAVENOUS at 03:11

## 2022-09-29 RX ADMIN — CEFTRIAXONE SODIUM 1000 MG: 10 INJECTION, POWDER, FOR SOLUTION INTRAVENOUS at 08:53

## 2022-09-29 NOTE — ED NOTES
Patient transported to [facility], [address] [facility]  09/29/22 9248

## 2022-09-29 NOTE — Clinical Note
Nancy Renees was seen and treated in our emergency department on 9/29/2022  No restrictions            Diagnosis:     Denisse    She may return on this date: 09/30/2022         If you have any questions or concerns, please don't hesitate to call        Jody Pozo RN    ______________________________           _______________          _______________  [facility] Representative                              Date                                Time

## 2022-09-29 NOTE — Clinical Note
Caleb Jamison was seen and treated in our emergency department on 9/29/2022  Diagnosis:     Marcelo Vasquez  may return to school on return date  She may return on this date: 10/01/2022         If you have any questions or concerns, please don't hesitate to call        Zina Salguero MD    ______________________________           _______________          _______________  Hospital Representative                              Date                                Time

## 2022-09-29 NOTE — Clinical Note
"CC:Cough and congestion     HPI:  Graciela Is a 7 year old female is here with her mother , who is a nurse at [employer] who is on her maternity leave . She and her brother both have developed cough and congestion .      Patient Active Problem List    Diagnosis Date Noted   • History of insulin dependent diabetes mellitus in sibling 01/12/2021   • Family history of autism 01/12/2021   • Cognitive deficits 01/12/2021   • Learning difficulty 01/12/2021   • Speech delay 01/12/2021       Current Outpatient Medications   Medication Sig Dispense Refill   • Melatonin 1 MG Tab At night when needed       No current facility-administered medications for this visit.        Patient has no known allergies.        Family History   Problem Relation Age of Onset   • No Known Problems Mother    • GI Disease Father         Gets bloated stomach with certain foods   • Allergies Father         Food allergy to chicken (outgrew as adult)   • Diabetes Brother 4        Type 1   • Other Maternal Grandmother         History of Cardona Parkinson White       No past surgical history on file.    ROS:    See HPI above. All other systems were reviewed and are negative.    BP 96/64   Pulse 98   Temp 36.7 °C (98 °F)   Resp 24   Ht 1.172 m (3' 10.16\")   Wt 22.7 kg (50 lb)   SpO2 97%   BMI 16.50 kg/m²     Physical Exam:  Gen:  Alert, active, well appearing  HEENT:  PERRLA, TM's clear b/l, oropharynx with no erythema or exudate Nose is with rhinorrhea   Neck:  Supple, FROM without tenderness, no lymphadenopathy  Lungs:  Clear to auscultation bilaterally, no wheezes/rales/rhonchi  CV:  Regular rate and rhythm. Normal S1/S2.  No murmurs.  Good pulses throughout.  Brisk capillary refill.  Abd:  Soft non tender, non distended. Normal active bowel sounds.  Ext:  WWP, no cyanosis, no edema  Skin:  No rashes or bruising.      Assessment and Plan:    1. Acute URI  Pathogenesis of viral infections discussed, including number expected per year, typical length and " Jim Shelton was seen and treated in our emergency department on 9/29/2022  No restrictions            Diagnosis:     Denisse    She may return on this date: 09/30/2022         If you have any questions or concerns, please don't hesitate to call        Anna Sanches RN    ______________________________           _______________          _______________  [facility] Representative                              Date                                Time natural progression. Symptomatic care discussed, including nasal saline, humidifier, encourage fluids, honey/[place] for cough, humidifier, may prefer to sleep at incline.  Do not give over the counter cold meds under 2 years of age. Antibiotics will not help a virus. Wash hands well and do not share food, drink, etc. Signs of dehydration and respiratory distress reviewed with parent/guardian. Return to clinic if not better in 7-10 days, getting worse, fever longer than 4 days, cough longer than 2 weeks, or signs of dehydration.      2. Close exposure to COVID-19 virus    - COVID/SARS CoV-2 PCR; Future

## 2022-09-29 NOTE — ED ATTENDING ATTESTATION
9/29/2022  IDigna DO, saw and evaluated the patient  I have discussed the patient with the resident/non-physician practitioner and agree with the resident's/non-physician practitioner's findings, Plan of Care, and MDM as documented in the resident's/non-physician practitioner's note, except where noted  All available labs and Radiology studies were reviewed  I was present for key portions of any procedure(s) performed by the resident/non-physician practitioner and I was immediately available to provide assistance  At this point I agree with the current assessment done in the Emergency Department  I have conducted an independent evaluation of this patient a history and physical is as follows:    51-year-old female presents with dysuria, right flank pain and sore throat  Patient was seen emergency department early this morning and diagnosed with likely pyelonephritis and started on antibiotics  Patient did get antibiotic IV in the emergency department but has not filled her oral antibiotic prescription  She states she started to get fevers and shaking chills today as well as some nausea  Also complains of sore throat worse on the left side and difficulty swallowing secondary to that  On exam-no acute distress, heart mildly tachycardic, swelling and erythema worse on a left side of oropharynx, abdomen soft, mild right CVA tenderness    Plan-check labs, CT soft tissue neck, IV fluids, Decadron and reassess    ED Course         Critical Care Time  Procedures

## 2022-09-29 NOTE — ED PROVIDER NOTES
History  Chief Complaint   Patient presents with    Back Pain     Pt states she was here for UTI this morning, pt states she experienced a fever this afternoon followed by chill, pt c/o back pain and nausea, pt referred back by health center     Ksenia Chung is a 26-year-old woman presenting with dysuria, right flank pain, sore throat  Chart review shows she was seen early this morning in the emergency department for the symptoms, had a CT and pelvis performed with lab work  She was ultimately diagnosed with UTI, sent home with Keflex  She has not taken any of the Keflex at home  Currently, her primary complaint is her sore throat  The left side of her throat hurts worse than the right  She reports a fever of 103F at home  She has some nausea  She has continued dysuria  She has a slight cough  She has left ear fullness  She had pharyngitis about 2 weeks ago, was prescribed amoxicillin, her symptoms completely resolved  No hematuria, urinary urgency or frequency, diarrhea  Prior to Admission Medications   Prescriptions Last Dose Informant Patient Reported? Taking? Multiple Vitamin (Multi-Day) TABS  Self Yes No   Sig: Take 1 tablet by mouth   Probiotic Product (Misc Intestinal Vikki Regulat) CAPS  Self Yes No   Sig: Take by mouth   Symbicort 160-4 5 MCG/ACT inhaler   No No   Sig: Inhale 2 puffs 2 (two) times a day Rinse mouth after use     albuterol (PROVENTIL HFA,VENTOLIN HFA) 90 mcg/act inhaler   No No   Sig: Inhale 2 puffs every 6 (six) hours as needed for wheezing   ascorbic acid (VITAMIN C) 500 MG tablet  Self Yes No   Sig: Take 500 mg by mouth   cephalexin (KEFLEX) 500 mg capsule   No No   Sig: Take 1 capsule (500 mg total) by mouth every 6 (six) hours for 14 days   fluticasone (FLONASE) 50 mcg/act nasal spray   No No   Si spray into each nostril daily   meloxicam (Mobic) 15 mg tablet   No No   Sig: Take 1 tablet (15 mg total) by mouth daily   norethindrone-ethinyl estradiol (Junel 1/20) 1-20 MG-MCG per tablet  Self No No   Sig: Take 1 tablet by mouth daily   ondansetron (Zofran ODT) 4 mg disintegrating tablet   No No   Sig: Take 1 tablet (4 mg total) by mouth every 6 (six) hours as needed for nausea or vomiting   oxyCODONE (Roxicodone) 5 immediate release tablet   No No   Sig: Take 1 tablet (5 mg total) by mouth every 4 (four) hours as needed for moderate pain for up to 12 doses Max Daily Amount: 30 mg      Facility-Administered Medications: None       Past Medical History:   Diagnosis Date    Exercise-induced asthma        Past Surgical History:   Procedure Laterality Date    APPENDECTOMY      WISDOM TOOTH EXTRACTION         Family History   Problem Relation Age of Onset    Heart disease Maternal Grandfather      I have reviewed and agree with the history as documented  E-Cigarette/Vaping    E-Cigarette Use Never User      E-Cigarette/Vaping Substances    Nicotine No     THC No     CBD No     Flavoring No     Other No     Unknown No      Social History     Tobacco Use    Smoking status: Never Smoker    Smokeless tobacco: Never Used   Vaping Use    Vaping Use: Never used   Substance Use Topics    Alcohol use: Yes     Comment: social     Drug use: Never        Review of Systems   All other systems reviewed and are negative  Physical Exam  ED Triage Vitals   Temperature Pulse Respirations Blood Pressure SpO2   09/29/22 1554 09/29/22 1554 09/29/22 1554 09/29/22 1555 09/29/22 1554   100 4 °F (38 °C) (!) 119 18 134/65 97 %      Temp Source Heart Rate Source Patient Position - Orthostatic VS BP Location FiO2 (%)   09/29/22 1554 09/29/22 1554 -- -- --   Oral Monitor         Pain Score       09/29/22 1743       6             Orthostatic Vital Signs  Vitals:    09/29/22 1554 09/29/22 1555 09/29/22 1626 09/29/22 1946   BP:  134/65 119/57 111/51   Pulse: (!) 119  (!) 107 103       Physical Exam  Vitals and nursing note reviewed     Constitutional:       General: She is not in acute distress  Appearance: She is well-developed  She is ill-appearing  She is not toxic-appearing  HENT:      Head: Normocephalic and atraumatic  Right Ear: External ear normal       Left Ear: External ear normal       Ears:      Comments: Left TM with effusion  Normal right TM  Mouth/Throat:      Comments: Left tonsillar swelling, exudates  Eyes:      Conjunctiva/sclera: Conjunctivae normal    Cardiovascular:      Rate and Rhythm: Regular rhythm  Tachycardia present  Heart sounds: No murmur heard  Pulmonary:      Effort: Pulmonary effort is normal  No respiratory distress  Breath sounds: Normal breath sounds  No stridor  Abdominal:      General: There is no distension  Palpations: Abdomen is soft  Tenderness: There is left CVA tenderness  There is no right CVA tenderness or guarding  Comments: Suprapubic tenderness  Musculoskeletal:         General: No deformity  Cervical back: Normal range of motion and neck supple  No rigidity  Skin:     General: Skin is warm and dry  Neurological:      General: No focal deficit present  Mental Status: She is alert and oriented to person, place, and time           ED Medications  Medications   ibuprofen (MOTRIN) tablet 600 mg (600 mg Oral Given 9/29/22 1743)   cephalexin (KEFLEX) capsule 500 mg (500 mg Oral Given 9/29/22 1743)   sodium chloride 0 9 % bolus 1,000 mL (0 mL Intravenous Stopped 9/29/22 2039)   ondansetron (ZOFRAN) injection 4 mg (4 mg Intravenous Given 9/29/22 1748)   dexamethasone (PF) (DECADRON) injection 10 mg (10 mg Intravenous Given 9/29/22 1824)   potassium chloride (K-DUR,KLOR-CON) CR tablet 40 mEq (40 mEq Oral Given 9/29/22 1846)   iohexol (OMNIPAQUE) 350 MG/ML injection (SINGLE-DOSE) 100 mL (100 mL Intravenous Given 9/29/22 1833)       Diagnostic Studies  Results Reviewed     Procedure Component Value Units Date/Time    Blood culture #2 [[id number]] Collected: 09/29/22 1823    Lab Status: Preliminary result Specimen: Blood from Arm, Left Updated: 09/29/22 2304     Blood Culture Received in Microbiology Lab  Culture in Progress  Blood culture #1 [[id number]] Collected: 09/29/22 1802    Lab Status: Preliminary result Specimen: Blood from Arm, Right Updated: 09/29/22 2203     Blood Culture Received in Microbiology Lab  Culture in Progress  Gwen Lu [[id number]]  (Normal) Collected: 09/29/22 1823    Lab Status: Final result Specimen: Blood from Arm, Left Updated: 09/29/22 1925     Protime 13 9 seconds      INR 1 05    APTT [[id number]]  (Normal) Collected: 09/29/22 1823    Lab Status: Final result Specimen: Blood from Arm, Left Updated: 09/29/22 1925     PTT 29 seconds     Procalcitonin [[id number]]  (Normal) Collected: 09/29/22 1823    Lab Status: Final result Specimen: Blood from Arm, Left Updated: 09/29/22 1917     Procalcitonin 0 07 ng/ml     Lactic acid [[id number]]  (Normal) Collected: 09/29/22 1823    Lab Status: Final result Specimen: Blood from Arm, Left Updated: 09/29/22 1910     LACTIC ACID 1 1 mmol/L     Narrative:      Result may be elevated if tourniquet was used during collection      Basic metabolic panel [[id number]]  (Abnormal) Collected: 09/29/22 1751    Lab Status: Final result Specimen: Blood from Arm, Right Updated: 09/29/22 1823     Sodium 138 mmol/L      Potassium 3 3 mmol/L      Chloride 110 mmol/L      CO2 21 mmol/L      ANION GAP 7 mmol/L      BUN 6 mg/dL      Creatinine 0 75 mg/dL      Glucose 95 mg/dL      Calcium 8 7 mg/dL      eGFR 115 ml/min/1 73sq m     Narrative:      [place] guidelines for Chronic Kidney Disease (CKD):     Stage 1 with normal or high GFR (GFR > 90 mL/min/1 73 square meters)    Stage 2 Mild CKD (GFR = 60-89 mL/min/1 73 square meters)    Stage 3A Moderate CKD (GFR = 45-59 mL/min/1 73 square meters)    Stage 3B Moderate CKD (GFR = 30-44 mL/min/1 73 square meters)    Stage 4 Severe CKD (GFR = 15-29 mL/min/1 73 square meters)    Stage 5 End Stage CKD (GFR <15 mL/min/1 73 square meters)  Note: GFR calculation is accurate only with a steady state creatinine    CBC and differential [[id number]]  (Abnormal) Collected: 09/29/22 1751    Lab Status: Final result Specimen: Blood from Arm, Right Updated: 09/29/22 1813     WBC 15 24 Thousand/uL      RBC 4 73 Million/uL      Hemoglobin 13 8 g/dL      Hematocrit 42 6 %      MCV 90 fL      MCH 29 2 pg      MCHC 32 4 g/dL      RDW 12 5 %      MPV 10 8 fL      Platelets 659 Thousands/uL      nRBC 0 /100 WBCs      Neutrophils Relative 84 %      Immat GRANS % 1 %      Lymphocytes Relative 7 %      Monocytes Relative 8 %      Eosinophils Relative 0 %      Basophils Relative 0 %      Neutrophils Absolute 12 81 Thousands/µL      Immature Grans Absolute 0 09 Thousand/uL      Lymphocytes Absolute 1 10 Thousands/µL      Monocytes Absolute 1 17 Thousand/µL      Eosinophils Absolute 0 01 Thousand/µL      Basophils Absolute 0 06 Thousands/µL                  CT soft tissue neck with contrast   Final Result by Cecilia Escalona MD (09/29 1934)      Findings consistent with acute pharyngitis  Enlargement of the palatine tonsils, containing phlegmon and/or microabscesses  Workstation performed: [id number]               Procedures  Procedures      ED Course  ED Course as of 09/30/22 0638   u Sep 29, 2022   1829 Potassium(!): 3 3  Given oral potassium   1914 LACTIC ACID: 1 1   1929 Reassess, symptoms improved, patient updated on results  1957 Given PO challenge, will reassess  2025 Tolerated PO challenge                                       MDM  Number of Diagnoses or Management Options  Fever  Pharyngitis  Right flank pain  Sore throat  Diagnosis management comments: 24 yo woman presenting with sore throat, fever, and continued dysuria and flank pain   Given sore throat as chief complaint this morning, significant tonsillar swelling and exudates, will evaluate for peritonsillar/tonsillar abscess, other deep space infection with CT soft tissue neck  Given continued symptoms, ill but non-toxic appearance, tachycardia, and leukocytosis yesterday, patient meets SIRS criteria, will also evaluate for sepsis with labs  Symptom management with Zofran, Decadron, fluid bolus, Keflex  Given possible UTI based on previous evaluation, will give dose of Keflex here now  CT shows pharyngitis  Lactic <2  Persistent leukocytosis  Symptoms improved  Instructed to continue Keflex  Chart review confirms prescriptions for Zofran and Keflex sent to pharmacy on previous visit, patient to  upon discharge from ED  PO challenged, able to eat and drink in ED  Discharged home in stable condition, return precautions given  Disposition  Final diagnoses:   Right flank pain   Fever   Sore throat   Pharyngitis     Time reflects when diagnosis was documented in both MDM as applicable and the Disposition within this note     Time User Action Codes Description Comment    9/29/2022  8:25 PM Terris Floor Add [R10 9] Right flank pain     9/29/2022  8:25 PM Laura Pickles [R50 9] Fever     9/29/2022  8:26 PM Terris Floor Add [J02 9] Sore throat     9/29/2022  8:26 PM Terris Floor Add [J02 9] Pharyngitis       ED Disposition     ED Disposition   Discharge    Condition   Stable    Date/Time   Thu Sep 29, 2022  8:25 PM    Comment   Reid Saavedra discharge to home/self care                 Follow-up Information     Follow up With Specialties Details Why Contact Info Additional Information    Kerri Vizcaino MD Pediatrics   [id number] [facility]   [address]  [facility] [phone]       [address] [place] [address] Emergency Department Emergency Medicine  If symptoms worsen [address] [phone]  [address] Emergency Department, [address], [place], [place], [address]          Discharge Medication List as of 9/29/2022  8:27 PM      CONTINUE these medications which have NOT CHANGED    Details   cephalexin (KEFLEX) 500 mg capsule Take 1 capsule (500 mg total) by mouth every 6 (six) hours for 14 days, Starting Thu 9/29/2022, Until Thu 10/13/2022, Normal      ondansetron (Zofran ODT) 4 mg disintegrating tablet Take 1 tablet (4 mg total) by mouth every 6 (six) hours as needed for nausea or vomiting, Starting Thu 9/29/2022, Normal      albuterol (PROVENTIL HFA,VENTOLIN HFA) 90 mcg/act inhaler Inhale 2 puffs every 6 (six) hours as needed for wheezing, Starting Thu 9/1/2022, Normal      ascorbic acid (VITAMIN C) 500 MG tablet Take 500 mg by mouth, Historical Med      fluticasone (FLONASE) 50 mcg/act nasal spray 1 spray into each nostril daily, Starting Thu 9/1/2022, Normal      meloxicam (Mobic) 15 mg tablet Take 1 tablet (15 mg total) by mouth daily, Starting Wed 7/6/2022, Normal      Multiple Vitamin (Multi-Day) TABS Take 1 tablet by mouth, Historical Med      norethindrone-ethinyl estradiol (Junel 1/20) 1-20 MG-MCG per tablet Take 1 tablet by mouth daily, Starting Tue 4/26/2022, Normal      oxyCODONE (Roxicodone) 5 immediate release tablet Take 1 tablet (5 mg total) by mouth every 4 (four) hours as needed for moderate pain for up to 12 doses Max Daily Amount: 30 mg, Starting Sun 7/24/2022, Normal      Probiotic Product (Misc Intestinal Vikki Regulat) CAPS Take by mouth, Historical Med      Symbicort 160-4 5 MCG/ACT inhaler Inhale 2 puffs 2 (two) times a day Rinse mouth after use , Starting Thu 9/1/2022, Normal           No discharge procedures on file  PDMP Review     None           ED Provider  Attending physically available and evaluated Matheus Collins  [employer] managed the patient along with the ED Attending      Electronically Signed by         Katie Salazar MD  09/30/22 1713

## 2022-09-29 NOTE — ED PROVIDER NOTES
History  No chief complaint on file  Pt is a 28yo F who presents for ***    Hx of kidney stones and R sided ovarian cyst  Dysuria x1 week  Bilateral flank pain L>R  Chills, N/V  Throat pain and swelling? Excedrin x1 for headache  Recent abx for strep  Symbicort for asthma  Feels different from previous stones, more dull than sharp    Exam: Uncomfortable appearing, central/epigastric abdominal tenderness, no CVA tenderness          Prior to Admission Medications   Prescriptions Last Dose Informant Patient Reported? Taking? Multiple Vitamin (Multi-Day) TABS  Self Yes No   Sig: Take 1 tablet by mouth   Probiotic Product (Misc Intestinal Vikki Regulat) CAPS  Self Yes No   Sig: Take by mouth   Symbicort 160-4 5 MCG/ACT inhaler   No No   Sig: Inhale 2 puffs 2 (two) times a day Rinse mouth after use  albuterol (PROVENTIL HFA,VENTOLIN HFA) 90 mcg/act inhaler   No No   Sig: Inhale 2 puffs every 6 (six) hours as needed for wheezing   ascorbic acid (VITAMIN C) 500 MG tablet  Self Yes No   Sig: Take 500 mg by mouth   fluticasone (FLONASE) 50 mcg/act nasal spray   No No   Si spray into each nostril daily   meloxicam (Mobic) 15 mg tablet   No No   Sig: Take 1 tablet (15 mg total) by mouth daily   norethindrone-ethinyl estradiol (Junel ) 1-20 MG-MCG per tablet  Self No No   Sig: Take 1 tablet by mouth daily   oxyCODONE (Roxicodone) 5 immediate release tablet   No No   Sig: Take 1 tablet (5 mg total) by mouth every 4 (four) hours as needed for moderate pain for up to 12 doses Max Daily Amount: 30 mg      Facility-Administered Medications: None       Past Medical History:   Diagnosis Date    Exercise-induced asthma        Past Surgical History:   Procedure Laterality Date    APPENDECTOMY      WISDOM TOOTH EXTRACTION         Family History   Problem Relation Age of Onset    Heart disease Maternal Grandfather      I have reviewed and agree with the history as documented      E-Cigarette/Vaping    E-Cigarette Use Never User      E-Cigarette/Vaping Substances    Nicotine No     THC No     CBD No     Flavoring No     Other No     Unknown No      Social History     Tobacco Use    Smoking status: Never Smoker    Smokeless tobacco: Never Used   Vaping Use    Vaping Use: Never used   Substance Use Topics    Alcohol use: Yes     Comment: social     Drug use: Never        Review of Systems    Physical Exam  ED Triage Vitals   Temp Pulse Resp BP SpO2   -- -- -- -- --      Temp src Heart Rate Source Patient Position - Orthostatic VS BP Location FiO2 (%)   -- -- -- -- --      Pain Score       --             Orthostatic Vital Signs  There were no vitals filed for this visit  Physical Exam    ED Medications  Medications - No data to display    Diagnostic Studies  Results Reviewed     None                 No orders to display         Procedures  Procedures      ED Course                                       MDM    Disposition  Final diagnoses:   None     ED Disposition     None      Follow-up Information    None         Patient's Medications   Discharge Prescriptions    No medications on file     No discharge procedures on file  PDMP Review     None           ED Provider  Attending physically available and evaluated Kassi Woodard I managed the patient along with the ED Attending      Electronically Signed by Eyes:      Extraocular Movements: Extraocular movements intact  Pupils: Pupils are equal, round, and reactive to light  Cardiovascular:      Rate and Rhythm: Regular rhythm  Tachycardia present  Heart sounds: Normal heart sounds  No murmur heard  Pulmonary:      Effort: Pulmonary effort is normal  No respiratory distress  Breath sounds: Normal breath sounds  Abdominal:      General: Bowel sounds are normal  There is no distension  Palpations: Abdomen is soft  Tenderness: There is abdominal tenderness (epigastric)  There is left CVA tenderness  There is no right CVA tenderness, guarding or rebound  Musculoskeletal:         General: Normal range of motion  Cervical back: Normal range of motion and neck supple  No rigidity  Right lower leg: No edema  Left lower leg: No edema  Lymphadenopathy:      Cervical: Cervical adenopathy (L>R; Mildly tender) present  Skin:     General: Skin is warm and dry  Capillary Refill: Capillary refill takes less than 2 seconds  Coloration: Skin is not pale  Findings: No erythema or rash  Neurological:      General: No focal deficit present  Mental Status: She is alert and oriented to person, place, and time  Psychiatric:         Speech: Speech normal          Behavior: Behavior is cooperative           ED Medications  Medications   sodium chloride 0 9 % bolus 1,000 mL (0 mL Intravenous Stopped 9/29/22 0606)   ketorolac (TORADOL) injection 15 mg (15 mg Intravenous Given 9/29/22 0312)   ondansetron (ZOFRAN) injection 4 mg (4 mg Intravenous Given 9/29/22 0311)   morphine injection 4 mg (4 mg Intravenous Given 9/29/22 0425)   iohexol (OMNIPAQUE) 350 MG/ML injection (SINGLE-DOSE) 100 mL (100 mL Intravenous Given 9/29/22 0438)   ceftriaxone (ROCEPHIN) 1 g/50 mL in dextrose IVPB (0 mg Intravenous Stopped 9/29/22 0918)   Lidocaine Viscous HCl (XYLOCAINE) 2 % mucosal solution 15 mL (15 mL Swish & Swallow Given 9/29/22 0897) Diagnostic Studies  Results Reviewed     Procedure Component Value Units Date/Time    Urine culture [[id number]] Collected: 09/29/22 0615    Lab Status: Final result Specimen: Urine Updated: 10/01/22 0909     Urine Culture 70,000-79,000 cfu/ml     Urine Microscopic [[id number]]  (Abnormal) Collected: 09/29/22 0615    Lab Status: Final result Specimen: Urine, Clean Catch Updated: 09/29/22 0712     RBC, UA 2-4 /hpf      WBC, UA None Seen /hpf      Epithelial Cells Occasional /hpf      Bacteria, UA Occasional /hpf     Urine Macroscopic, POC [[id number]]  (Abnormal) Collected: 09/29/22 0615    Lab Status: Final result Specimen: Urine Updated: 09/29/22 0616     Color, UA Yellow     Clarity, UA Clear     pH, UA 7 0     Leukocytes, UA Negative     Nitrite, UA Negative     Protein,  (2+) mg/dl      Glucose, UA Negative mg/dl      Ketones, UA 15 (1+) mg/dl      Urobilinogen, UA 0 2 E U /dl      Bilirubin, UA Negative     Occult Blood, UA Trace     Specific [place], UA 1 010    Narrative:      CLINITEK RESULT    Urine Microscopic [[id number]]  (Abnormal) Collected: 09/29/22 0306    Lab Status: Final result Specimen: Urine, Clean Catch Updated: 09/29/22 0559     RBC, UA None Seen /hpf      WBC, UA 2-4 /hpf      Epithelial Cells Moderate /hpf      Bacteria, UA Moderate /hpf     FLU/RSV/COVID - if FLU/RSV clinically relevant [[id number]]  (Normal) Collected: 09/29/22 0316    Lab Status: Final result Specimen: Nares from Nose Updated: 09/29/22 0406     SARS-CoV-2 Negative     INFLUENZA A PCR Negative     INFLUENZA B PCR Negative     RSV PCR Negative    Narrative:      FOR PEDIATRIC PATIENTS - copy/paste COVID Guidelines URL to browser: [email or web address]    SARS-CoV-2 assay is a Nucleic Acid Amplification assay intended for the  qualitative detection of nucleic acid from SARS-CoV-2 in nasopharyngeal  swabs   Results are for the presumptive identification of SARS-CoV-2 RNA     Positive results are indicative of infection with SARS-CoV-2, the virus  causing COVID-19, but do not rule out bacterial infection or co-infection  with other viruses  Laboratories within the [place] and its  territories are required to report all positive results to the appropriate  public health authorities  Negative results do not preclude SARS-CoV-2  infection and should not be used as the sole basis for treatment or other  patient management decisions  Negative results must be combined with  clinical observations, patient history, and epidemiological information  This test has not been FDA cleared or approved  This test has been authorized by FDA under an Emergency Use Authorization  (EUA)  This test is only authorized for the duration of time the  declaration that circumstances exist justifying the authorization of the  emergency use of an in vitro diagnostic tests for detection of SARS-CoV-2  virus and/or diagnosis of COVID-19 infection under section 564(b)(1) of  the Act, 21 U  S C  682XAM-7(Q)(8), unless the authorization is terminated  or revoked sooner  The test has been validated but independent review by FDA  and CLIA is pending  Test performed using [employer] GeneXpert: This RT-PCR assay targets N2,  a region unique to SARS-CoV-2  A conserved region in the E-gene was chosen  for pan-Sarbecovirus detection which includes SARS-CoV-2  According to CMS-2020-01-R, this platform meets the definition of high-throughput technology      Comprehensive metabolic panel [[id number]]  (Abnormal) Collected: 09/29/22 0316    Lab Status: Final result Specimen: Blood from Arm, Right Updated: 09/29/22 0356     Sodium 134 mmol/L      Potassium 3 6 mmol/L      Chloride 103 mmol/L      CO2 26 mmol/L      ANION GAP 5 mmol/L      BUN 8 mg/dL      Creatinine 0 76 mg/dL      Glucose 104 mg/dL      Calcium 9 4 mg/dL      AST 8 U/L      ALT 12 U/L      Alkaline Phosphatase 80 U/L      Total Protein 8 6 g/dL Albumin 4 0 g/dL      Total Bilirubin 0 41 mg/dL      eGFR 113 ml/min/1 73sq m     Narrative:      [place] guidelines for Chronic Kidney Disease (CKD):   •  Stage 1 with normal or high GFR (GFR > 90 mL/min/1 73 square meters)  •  Stage 2 Mild CKD (GFR = 60-89 mL/min/1 73 square meters)  •  Stage 3A Moderate CKD (GFR = 45-59 mL/min/1 73 square meters)  •  Stage 3B Moderate CKD (GFR = 30-44 mL/min/1 73 square meters)  •  Stage 4 Severe CKD (GFR = 15-29 mL/min/1 73 square meters)  •  Stage 5 End Stage CKD (GFR <15 mL/min/1 73 square meters)  Note: GFR calculation is accurate only with a steady state creatinine    Lipase [[id number]]  (Normal) Collected: 09/29/22 0316    Lab Status: Final result Specimen: Blood from Arm, Right Updated: 09/29/22 0356     Lipase 101 u/L     CBC and differential [[id number]]  (Abnormal) Collected: 09/29/22 0316    Lab Status: Final result Specimen: Blood from Arm, Right Updated: 09/29/22 0333     WBC 16 67 Thousand/uL      RBC 4 72 Million/uL      Hemoglobin 14 2 g/dL      Hematocrit 42 9 %      MCV 91 fL      MCH 30 1 pg      MCHC 33 1 g/dL      RDW 12 5 %      MPV 10 6 fL      Platelets 995 Thousands/uL      nRBC 0 /100 WBCs      Neutrophils Relative 83 %      Immat GRANS % 1 %      Lymphocytes Relative 9 %      Monocytes Relative 6 %      Eosinophils Relative 1 %      Basophils Relative 0 %      Neutrophils Absolute 13 96 Thousands/µL      Immature Grans Absolute 0 08 Thousand/uL      Lymphocytes Absolute 1 41 Thousands/µL      Monocytes Absolute 1 03 Thousand/µL      Eosinophils Absolute 0 12 Thousand/µL      Basophils Absolute 0 07 Thousands/µL     POCT pregnancy, urine [[id number]]  (Normal) Resulted: 09/29/22 0308    Lab Status: Final result Updated: 09/29/22 0315     EXT PREG TEST UR (Ref: Negative) neg     Control valid    Urine Macroscopic, POC [[id number]]  (Abnormal) Collected: 09/29/22 0306    Lab Status: Final result Specimen: Urine Updated: 09/29/22 0307     Color, UA Yellow     Clarity, UA Clear     pH, UA 7 0     Leukocytes, UA Trace     Nitrite, UA Negative     Protein, UA 30 (1+) mg/dl      Glucose, UA Negative mg/dl      Ketones, UA Trace mg/dl      Urobilinogen, UA 0 2 E U /dl      Bilirubin, UA Negative     Occult Blood, UA Negative     Specific Gravity, UA 1 025    Narrative:      CLINITEK RESULT                 CT abdomen pelvis with contrast   Final Result by Lionel Michaels DO (09/29 7496)      Partially distended bladder  Mild circumferential bladder wall thickening noted, probably exaggerated by underdistention  Superimposed cystitis considered in the appropriate clinical setting  Scattered groundglass opacities in the bilateral lower lung fields, could represent atelectasis or infectious or inflammatory pneumonitis  Correlation with patient's symptoms and laboratory values recommended  Bilateral nephrolithiasis, no hydronephrosis  Bilateral renal cysts, and other findings as above  Workstation performed: [id number]               Procedures  Procedures      ED Course  ED Course as of 10/11/22 1336   Thu Sep 29, 2022   0308 Leukocytes, UA(!): Trace   0308 Nitrite, UA: Negative   0308 Blood, UA: Negative   0316 PREGNANCY TEST URINE: neg   0339 WBC(!): 16 67  Elevated  Non-diagnostic  Awaiting further w/u     5850 Comprehensive metabolic panel(!)  Reviewed and without actionable derangement  0357 Lipase: 101  WNL   0411 FLU/RSV/COVID - if FLU/RSV clinically relevant  Negative  0419 Pt with worsening R flank pain  Pain meds ordered  0452 Pulse(!): 122  Increased despite fluids  0518 CT abdomen pelvis with contrast  Partially distended bladder  Mild circumferential bladder wall thickening noted, probably exaggerated by underdistention  Superimposed cystitis considered in the appropriate clinical setting    Scattered groundglass opacities in the bilateral lower lung fields, could represent atelectasis or infectious or inflammatory pneumonitis  Correlation with patient's symptoms and laboratory values recommended  Bilateral nephrolithiasis, no hydronephrosis  Bilateral renal cysts, and other findings as above  0518 Awaiting urine micro  [address] Reassessed pt who reports some improvement in symptoms  Pt updated on all results thus far as well as incidental findings  0559 WBC, UA(!): 2-4   0600 Bacteria, UA(!): Moderate   0600 Epithelial Cells(!): Moderate  Will repeat sample  0617 Leukocytes, UA: Negative   0617 Nitrite, UA: Negative   0617 Blood, UA(!): Trace   [id number] Awaiting repeat urine micro  0713 WBC, UA: None Seen   0713 Bacteria, UA: Occasional  As symptomatic, will treat as UTI  MDM  Number of Diagnoses or Management Options  Abdominal pain  Flank pain  Nausea and vomiting  Pyelonephritis  Diagnosis management comments: Pt is a 28yo F who presents with dysuria and flank pain  Exam pertinent for CVA tenderness and tachycardia  Differential diagnosis to include but not limited to pyelonephritis, nephrolithiasis, viral syndrome, pharyngitis  Will plan for abdominal labs and CT  Will also get UA and viral swab  Will treat symtpoamtically  Pt inevitable diagnosed with pyelonephritis based on UA with CVA tenderness and pharyngitis, likely viral  Abx intiated for pyelo and pt also given medications for pain/symptom control  Pt made aware of all results and plan for DC with course of abx  Pt agreeable  Plan to discharge pt with f/u to PCP  Discussed returning the ED with significant worsening of symptoms  Discussed use of over the counter medications as stated on the bottle as needed for pain  Discussed taking new medication as prescribed and to completion  Pt expressed understanding of discharge instructions, return precautions, and medication instructions  All questions were answered and pt was discharged without incident  Amount and/or Complexity of Data Reviewed  Clinical lab tests: ordered and reviewed  Tests in the radiology section of CPT®: ordered and reviewed        Disposition  Final diagnoses:   Flank pain   Nausea and vomiting   Abdominal pain   Pyelonephritis     Time reflects when diagnosis was documented in both MDM as applicable and the Disposition within this note     Time User Action Codes Description Comment    9/29/2022  6:28 AM Herschell Schwalbe Add [R10 9] Flank pain     9/29/2022  6:28 AM Herschell Schwalbe Add [R11 2] Nausea and vomiting     9/29/2022  6:28 AM Herschell Schwalbe Add [R10 9] Abdominal pain     9/29/2022  7:17 AM Herschell Schwalbe Add [N12] Pyelonephritis     9/29/2022  7:17 AM Herschell Schwalbe Modify [R10 9] Flank pain     9/29/2022  7:17 AM Herschell Schwalbe Modify [N12] Pyelonephritis       ED Disposition     ED Disposition   Discharge    Condition   Stable    Date/Time   Thu Sep 29, 2022  7:17 AM    Comment   Kassi Woodard discharge to home/self care                 Follow-up Information     Follow up With Specialties Details Why Contact Kelsie Oconnor MD Pediatrics Call  As needed [id number] [address]  [address]  [phone]            Discharge Medication List as of 9/29/2022  7:19 AM      START taking these medications    Details   cephalexin (KEFLEX) 500 mg capsule Take 1 capsule (500 mg total) by mouth every 6 (six) hours for 14 days, Starting Thu 9/29/2022, Until Thu 10/13/2022, Normal      ondansetron (Zofran ODT) 4 mg disintegrating tablet Take 1 tablet (4 mg total) by mouth every 6 (six) hours as needed for nausea or vomiting, Starting Thu 9/29/2022, Normal         CONTINUE these medications which have NOT CHANGED    Details   albuterol (PROVENTIL HFA,VENTOLIN HFA) 90 mcg/act inhaler Inhale 2 puffs every 6 (six) hours as needed for wheezing, Starting Thu 9/1/2022, Normal      ascorbic acid (VITAMIN C) 500 MG tablet Take 500 mg by mouth, [facility] fluticasone (FLONASE) 50 mcg/act nasal spray 1 spray into each nostril daily, Starting Thu 9/1/2022, Normal      meloxicam (Mobic) 15 mg tablet Take 1 tablet (15 mg total) by mouth daily, Starting Wed 7/6/2022, Normal      Multiple Vitamin (Multi-Day) TABS Take 1 tablet by mouth, Historical Med      norethindrone-ethinyl estradiol (Junel 1/20) 1-20 MG-MCG per tablet Take 1 tablet by mouth daily, Starting Tue 4/26/2022, Normal      oxyCODONE (Roxicodone) 5 immediate release tablet Take 1 tablet (5 mg total) by mouth every 4 (four) hours as needed for moderate pain for up to 12 doses Max Daily Amount: 30 mg, Starting Sun 7/24/2022, Normal      Probiotic Product (Misc Intestinal Vikki Regulat) CAPS Take by mouth, Historical Med      Symbicort 160-4 5 MCG/ACT inhaler Inhale 2 puffs 2 (two) times a day Rinse mouth after use , Starting Thu 9/1/2022, Normal           No discharge procedures on file  PDMP Review     None           ED Provider  Attending physically available and evaluated Jim Shelton  [employer] managed the patient along with the ED Attending      Electronically Signed by         Gui Bautista MD  10/11/22 4213

## 2022-09-29 NOTE — Clinical Note
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

## 2022-09-29 NOTE — ED ATTENDING ATTESTATION
9/29/2022  ISabino MD, saw and evaluated the patient  I have discussed the patient with the resident/non-physician practitioner and agree with the resident's/non-physician practitioner's findings, Plan of Care, and MDM as documented in the resident's/non-physician practitioner's note, except where noted  All available labs and Radiology studies were reviewed  I was present for key portions of any procedure(s) performed by the resident/non-physician practitioner and I was immediately available to provide assistance  At this point I agree with the current assessment done in the Emergency Department  I have conducted an independent evaluation of this patient a history and physical is as follows:    ED Course  ED Course as of 09/29/22 0337   Thu Sep 29, 2022   0324 Per resident h&p 24 YO F presents for dysuria over the last week; also a diffuse HA; h/o ureteral colic O: female in no distress; tender cervical lymphnode     Emergency Department Note- Meagan Curtis 23 y o  female MRN: [id number]    Unit/Bed#: ED 19 Encounter: [id number]    Meagan Curtis is a 23 y o  female who presents with   Chief Complaint   Patient presents with    Flank Pain     Pt reports she has had flank pain and vomiting for the last week  Pt reports she has hx of kidney stones  Pt also reports she has pain to her L lymph node  History of Present Illness   HPI:  Meagan Curtis is a 23 y o  female who presents for evaluation of: Intermittent dysuria, flank discomfort, and vomiting over the last week  The patient has a history of ureteral colic and the symptoms are reminiscent of when she had ureteral colic  Patient also notes some discomfort over the left lateral neck  Patient notes that she also has a bilateral headache  Patient's flank pain is currently mild in the ED and feels like an aching sensation  Review of Systems   Constitutional: Negative for chills and fever     HENT: Negative for congestion and rhinorrhea  Respiratory: Negative for cough and shortness of breath  Cardiovascular: Negative for chest pain and palpitations  Gastrointestinal: Positive for nausea and vomiting  Genitourinary: Positive for dysuria and flank pain  Negative for frequency  Musculoskeletal: Positive for back pain  Negative for neck pain  Neurological: Negative for light-headedness and headaches  All other systems reviewed and are negative  Historical Information   Past Medical History:   Diagnosis Date    Exercise-induced asthma      Past Surgical History:   Procedure Laterality Date    APPENDECTOMY      WISDOM TOOTH EXTRACTION       Social History   Social History     Substance and Sexual Activity   Alcohol Use Yes    Comment: social      Social History     Substance and Sexual Activity   Drug Use Never     Social History     Tobacco Use   Smoking Status Never Smoker   Smokeless Tobacco Never Used     Family History:   Family History   Problem Relation Age of Onset    Heart disease Maternal Grandfather        Meds/Allergies   PTA meds:   Prior to Admission Medications   Prescriptions Last Dose Informant Patient Reported? Taking? Multiple Vitamin (Multi-Day) TABS  Self Yes No   Sig: Take 1 tablet by mouth   Probiotic Product (Misc Intestinal Vikki Regulat) CAPS  Self Yes No   Sig: Take by mouth   Symbicort 160-4 5 MCG/ACT inhaler   No No   Sig: Inhale 2 puffs 2 (two) times a day Rinse mouth after use     albuterol (PROVENTIL HFA,VENTOLIN HFA) 90 mcg/act inhaler   No No   Sig: Inhale 2 puffs every 6 (six) hours as needed for wheezing   ascorbic acid (VITAMIN C) 500 MG tablet  Self Yes No   Sig: Take 500 mg by mouth   fluticasone (FLONASE) 50 mcg/act nasal spray   No No   Si spray into each nostril daily   meloxicam (Mobic) 15 mg tablet   No No   Sig: Take 1 tablet (15 mg total) by mouth daily   norethindrone-ethinyl estradiol (Junel ) 1-20 MG-MCG per tablet  Self No No   Sig: Take 1 tablet by mouth daily   oxyCODONE (Roxicodone) 5 immediate release tablet   No No   Sig: Take 1 tablet (5 mg total) by mouth every 4 (four) hours as needed for moderate pain for up to 12 doses Max Daily Amount: 30 mg      Facility-Administered Medications: None     No Known Allergies    Objective   First Vitals:   Blood Pressure: 160/74 (09/29/22 0216)  Pulse: (!) 111 (09/29/22 0216)  Temperature: 98 3 °F (36 8 °C) (09/29/22 0216)  Temp Source: Oral (09/29/22 0216)  Respirations: 22 (09/29/22 0216)  SpO2: 100 % (09/29/22 0216)    Current Vitals:   Blood Pressure: 160/74 (09/29/22 0216)  Pulse: (!) 111 (09/29/22 0216)  Temperature: 98 3 °F (36 8 °C) (09/29/22 0216)  Temp Source: Oral (09/29/22 0216)  Respirations: 22 (09/29/22 0216)  SpO2: 100 % (09/29/22 0216)    No intake or output data in the 24 hours ending 09/29/22 0337    Invasive Devices  Report    Peripheral Intravenous Line  Duration           Peripheral IV 09/29/22 Right Antecubital <1 day                Physical Exam  Vitals and nursing note reviewed  Constitutional:       General: She is not in acute distress  Appearance: Normal appearance  She is well-developed  HENT:      Head: Normocephalic and atraumatic  Right Ear: External ear normal       Left Ear: External ear normal       Nose: Nose normal       Mouth/Throat:      Pharynx: No oropharyngeal exudate  Eyes:      Conjunctiva/sclera: Conjunctivae normal       Pupils: Pupils are equal, round, and reactive to light  Cardiovascular:      Rate and Rhythm: Normal rate and regular rhythm  Pulmonary:      Effort: Pulmonary effort is normal  No respiratory distress  Abdominal:      General: Abdomen is flat  There is no distension  Palpations: Abdomen is soft  Musculoskeletal:         General: No deformity  Normal range of motion  Cervical back: Normal range of motion and neck supple  Skin:     General: Skin is warm and dry  Capillary Refill: Capillary refill takes less than 2 seconds  Neurological:      General: No focal deficit present  Mental Status: She is alert and oriented to person, place, and time  Mental status is at baseline  Coordination: Coordination normal    Psychiatric:         Mood and Affect: Mood normal          Behavior: Behavior normal          Thought Content: Thought content normal          Judgment: Judgment normal            Medical Decision Makin  Flank pain, dysuria:  Urinalysis, a urine ECG  2  Nausea vomiting:  Antiemetics as needed  IV fluids        Recent Results (from the past 36 hour(s))   Urine Macroscopic, POC    Collection Time: 22  3:06 AM   Result Value Ref Range    Color, UA Yellow     Clarity, UA Clear     pH, UA 7 0 4 5 - 8 0    Leukocytes, UA Trace (A) Negative    Nitrite, UA Negative Negative    Protein, UA 30 (1+) (A) Negative mg/dl    Glucose, UA Negative Negative mg/dl    Ketones, UA Trace (A) Negative mg/dl    Urobilinogen, UA 0 2 0 2, 1 0 E U /dl E U /dl    Bilirubin, UA Negative Negative    Occult Blood, UA Negative Negative    Specific Gravity, UA 1 025 1 003 - 1 030   POCT pregnancy, urine    Collection Time: 22  3:08 AM   Result Value Ref Range    EXT PREG TEST UR (Ref: Negative) neg     Control valid    CBC and differential    Collection Time: 22  3:16 AM   Result Value Ref Range    WBC 16 67 (H) 4 31 - 10 16 Thousand/uL    RBC 4 72 3 81 - 5 12 Million/uL    Hemoglobin 14 2 11 5 - 15 4 g/dL    Hematocrit 42 9 34 8 - 46 1 %    MCV 91 82 - 98 fL    MCH 30 1 26 8 - 34 3 pg    MCHC 33 1 31 4 - 37 4 g/dL    RDW 12 5 11 6 - 15 1 %    MPV 10 6 8 9 - 12 7 fL    Platelets 674 815 - 283 Thousands/uL    nRBC 0 /100 WBCs    Neutrophils Relative 83 (H) 43 - 75 %    Immat GRANS % 1 0 - 2 %    Lymphocytes Relative 9 (L) 14 - 44 %    Monocytes Relative 6 4 - 12 %    Eosinophils Relative 1 0 - 6 %    Basophils Relative 0 0 - 1 %    Neutrophils Absolute 13 96 (H) 1 85 - 7 62 Thousands/µL    Immature Grans Absolute 0 08 0 00 - 0 20 Thousand/uL    Lymphocytes Absolute 1 41 0 60 - 4 47 Thousands/µL    Monocytes Absolute 1 03 0 17 - 1 22 Thousand/µL    Eosinophils Absolute 0 12 0 00 - 0 61 Thousand/µL    Basophils Absolute 0 07 0 00 - 0 10 Thousands/µL     No orders to display         Portions of the record may have been created with voice recognition software  Occasional wrong word or "sound a like" substitutions may have occurred due to the inherent limitations of voice recognition software  Read the chart carefully and recognize, using context, where substitutions have occurred            Critical Care Time  Procedures

## 2022-09-29 NOTE — DISCHARGE INSTRUCTIONS
Follow-up with PCP for further care  Contact info provided below if needed  Use over the counter medications as stated on the bottle as needed for pain control   - Tylenol  - Ibuprofen  Take your new medications as prescribed and to completion  Return to the ED with new or worsening symptoms

## 2022-09-30 NOTE — DISCHARGE INSTRUCTIONS
CONTINUE taking the antibiotic (Keflex)  CONTINUE taking the Zofran as needed for your nausea  You can take Tylenol and ibuprofen for your other cold symptoms  If you have any difficulty breathing, difficulty swallowing, or other new or worsening symptoms, please return to your nearest ER

## 2022-10-01 LAB — BACTERIA UR CULT: NORMAL

## 2022-10-02 LAB
BACTERIA BLD CULT: NORMAL
BACTERIA BLD CULT: NORMAL

## 2022-10-03 NOTE — PROGRESS NOTES
10/4/2022    Denisse Navarro  2000  [id number]        Assessment  -Nephrolithiasis   -Renal cyst    Discussion/Plan  Vandana Rossi is a 23 y o  female being managed by our office    1  Nephrolithiasis, renal cyst- reviewed the results of her recent CT scan from 09/29/2022 which identified bilateral nonobstructing renal calculi, bilateral renal cyst and mild bladder wall thickening  Her final urine culture identified mixed contaminants only, microscopic hematuria noted  I recommend patient complete course of antibiotic as prescribed by emergency department for presumed urinary tract infection  We will repeat urine testing 1 week after completion of antibiotic and call with her results  If findings are unremarkable, will plan to follow-up in 6 months with repeat renal ultrasound  Reviewed dietary and behavior modifications  Also provided patient with referral to Nephrology due to recurrent nephrolithiasis  Call with results of urine testing and follow-up in 6 months with renal ultrasound  She was advised to call sooner with any questions or issues     -All questions answered, patients agree with plan     History of Present Illness  23 y o  female with a history of nephrolithiasis and renal cyst presents today for follow up  Patient last seen in the office in July 2021  She was noted to have bilateral nonobstructing renal calculi on CT scan in February 2021 with mild hydronephrosis  A 2 cm left renal cyst also noted  Patient had recently presented to emergency department on 09/29/2022 with flank pain  CT scan identified mild bladder wall thickening consistent with cystitis and bilateral nonobstructing renal calculi  No evidence of hydronephrosis, bilateral renal cysts noted  Urine culture negative for infection and urine microscopic from 09/29/2022 identified 2-4 RBC on hpf  Patient currently on her menstrual cycle  She was discharged home on course of antibiotic    Patient does note symptoms have improved  Patient reports a strong family history of nephrolithiasis  She has passed multiple stones over the last 2 years  Patient denies any gross hematuria  Review of Systems  Review of Systems   Constitutional: Negative  HENT: Negative  Respiratory: Negative  Cardiovascular: Negative  Gastrointestinal: Negative  Genitourinary: Positive for dysuria and frequency  Negative for decreased urine volume, difficulty urinating, flank pain, hematuria and urgency  Musculoskeletal: Negative  Skin: Negative  Neurological: Negative  Psychiatric/Behavioral: Negative        Past Medical History  Past Medical History:   Diagnosis Date    Exercise-induced asthma        Past Social History  Past Surgical History:   Procedure Laterality Date    APPENDECTOMY      WISDOM TOOTH EXTRACTION         Past Family History  Family History   Problem Relation Age of Onset    Heart disease Maternal Grandfather        Past Social history  Social History     Socioeconomic History    Marital status: Single     Spouse name: Not on file    Number of children: Not on file    Years of education: Not on file    Highest education level: Not on file   Occupational History    Not on file   Tobacco Use    Smoking status: Never Smoker    Smokeless tobacco: Never Used   Vaping Use    Vaping Use: Never used   Substance and Sexual Activity    Alcohol use: Yes     Comment: social     Drug use: Never    Sexual activity: Not Currently     Partners: Male     Birth control/protection: Condom   Other Topics Concern    Not on file   Social History Narrative    Drinks one to two cups of coffee a day      Social Determinants of Health     Financial Resource Strain: Not on file   Food Insecurity: Not on file   Transportation Needs: Not on file   Physical Activity: Not on file   Stress: Not on file   Social Connections: Not on file   Intimate Partner Violence: Not on file   Housing Stability: Not on file       Current Medications  Current Outpatient Medications   Medication Sig Dispense Refill    albuterol (PROVENTIL HFA,VENTOLIN HFA) 90 mcg/act inhaler Inhale 2 puffs every 6 (six) hours as needed for wheezing 18 g 5    ascorbic acid (VITAMIN C) 500 MG tablet Take 500 mg by mouth      cephalexin (KEFLEX) 500 mg capsule Take 1 capsule (500 mg total) by mouth every 6 (six) hours for 14 days 56 capsule 0    fluticasone (FLONASE) 50 mcg/act nasal spray 1 spray into each nostril daily 15 8 mL 5    meloxicam (Mobic) 15 mg tablet Take 1 tablet (15 mg total) by mouth daily 30 tablet 3    Multiple Vitamin (Multi-Day) TABS Take 1 tablet by mouth      norethindrone-ethinyl estradiol (Junel 1/20) 1-20 MG-MCG per tablet Take 1 tablet by mouth daily 84 tablet 0    ondansetron (Zofran ODT) 4 mg disintegrating tablet Take 1 tablet (4 mg total) by mouth every 6 (six) hours as needed for nausea or vomiting 20 tablet 0    oxyCODONE (Roxicodone) 5 immediate release tablet Take 1 tablet (5 mg total) by mouth every 4 (four) hours as needed for moderate pain for up to 12 doses Max Daily Amount: 30 mg 12 tablet 0    Probiotic Product (Misc Intestinal Vikki Regulat) CAPS Take by mouth      Symbicort 160-4 5 MCG/ACT inhaler Inhale 2 puffs 2 (two) times a day Rinse mouth after use  30 6 g 5     No current facility-administered medications for this visit  Allergies  No Known Allergies    Past medical history, social history, family history, medications and allergies were reviewed  Vitals  There were no vitals filed for this visit  Physical Exam  Physical Exam  Constitutional:       Appearance: Normal appearance  She is well-developed  HENT:      Head: Normocephalic  Eyes:      Pupils: Pupils are equal, round, and reactive to light  Pulmonary:      Effort: Pulmonary effort is normal    Abdominal:      Palpations: Abdomen is soft  Tenderness: There is no right CVA tenderness or left CVA tenderness     Musculoskeletal: General: Normal range of motion  Cervical back: Normal range of motion  Skin:     General: Skin is warm and dry  Neurological:      General: No focal deficit present  Mental Status: She is alert and oriented to person, place, and time  Psychiatric:         Mood and Affect: Mood normal          Behavior: Behavior normal          Thought Content: Thought content normal          Judgment: Judgment normal          Results    I have personally reviewed all pertinent lab results and reviewed with patient  Lab Results   Component Value Date    CALCIUM 8 7 09/29/2022    K 3 3 (L) 09/29/2022    CO2 21 09/29/2022     (H) 09/29/2022    BUN 6 09/29/2022    CREATININE 0 75 09/29/2022     Lab Results   Component Value Date    WBC 15 24 (H) 09/29/2022    HGB 13 8 09/29/2022    HCT 42 6 09/29/2022    MCV 90 09/29/2022     09/29/2022     No results found for this or any previous visit (from the past 1 hour(s))

## 2022-10-04 ENCOUNTER — OFFICE VISIT (OUTPATIENT)
Dept: UROLOGY | Facility: AMBULATORY SURGERY CENTER | Age: 22
End: 2022-10-04
Payer: COMMERCIAL

## 2022-10-04 VITALS
WEIGHT: 220 LBS | BODY MASS INDEX: 31.5 KG/M2 | HEIGHT: 70 IN | SYSTOLIC BLOOD PRESSURE: 132 MMHG | DIASTOLIC BLOOD PRESSURE: 82 MMHG

## 2022-10-04 DIAGNOSIS — N39.0 URINARY TRACT INFECTION WITHOUT HEMATURIA, SITE UNSPECIFIED: ICD-10-CM

## 2022-10-04 DIAGNOSIS — N20.0 NEPHROLITHIASIS: ICD-10-CM

## 2022-10-04 DIAGNOSIS — R31.29 MICROSCOPIC HEMATURIA: Primary | ICD-10-CM

## 2022-10-04 LAB
BACTERIA BLD CULT: NORMAL
BACTERIA BLD CULT: NORMAL
SL AMB  POCT GLUCOSE, UA: NORMAL
SL AMB LEUKOCYTE ESTERASE,UA: NORMAL
SL AMB POCT BILIRUBIN,UA: NORMAL
SL AMB POCT BLOOD,UA: NORMAL
SL AMB POCT CLARITY,UA: CLEAR
SL AMB POCT COLOR,UA: YELLOW
SL AMB POCT KETONES,UA: NORMAL
SL AMB POCT NITRITE,UA: NORMAL
SL AMB POCT PH,UA: 5
SL AMB POCT SPECIFIC GRAVITY,UA: 1.01
SL AMB POCT URINE PROTEIN: NORMAL
SL AMB POCT UROBILINOGEN: 0.2

## 2022-10-04 PROCEDURE — 81002 URINALYSIS NONAUTO W/O SCOPE: CPT | Performed by: NURSE PRACTITIONER

## 2022-10-04 PROCEDURE — 99213 OFFICE O/P EST LOW 20 MIN: CPT | Performed by: NURSE PRACTITIONER

## 2022-10-04 RX ORDER — AMOXICILLIN 875 MG/1
875 TABLET, COATED ORAL 2 TIMES DAILY
COMMUNITY
Start: 2022-09-15 | End: 2022-10-26

## 2022-10-19 ENCOUNTER — LAB REQUISITION (OUTPATIENT)
Dept: LAB | Facility: HOSPITAL | Age: 22
End: 2022-10-19
Payer: COMMERCIAL

## 2022-10-19 ENCOUNTER — OFFICE VISIT (OUTPATIENT)
Dept: UROLOGY | Facility: AMBULATORY SURGERY CENTER | Age: 22
End: 2022-10-19

## 2022-10-19 VITALS
HEART RATE: 89 BPM | DIASTOLIC BLOOD PRESSURE: 70 MMHG | SYSTOLIC BLOOD PRESSURE: 118 MMHG | BODY MASS INDEX: 31.5 KG/M2 | HEIGHT: 70 IN | WEIGHT: 220 LBS

## 2022-10-19 DIAGNOSIS — M54.9 BILATERAL BACK PAIN, UNSPECIFIED BACK LOCATION, UNSPECIFIED CHRONICITY: Primary | ICD-10-CM

## 2022-10-19 DIAGNOSIS — N39.0 URINARY TRACT INFECTION, SITE NOT SPECIFIED: ICD-10-CM

## 2022-10-19 LAB
AMORPH URATE CRY URNS QL MICRO: ABNORMAL
BACTERIA UR QL AUTO: ABNORMAL /HPF
BILIRUB UR QL STRIP: NEGATIVE
CAOX CRY URNS QL MICRO: ABNORMAL /HPF
CLARITY UR: ABNORMAL
COLOR UR: YELLOW
GLUCOSE UR STRIP-MCNC: NEGATIVE MG/DL
HGB UR QL STRIP.AUTO: NEGATIVE
KETONES UR STRIP-MCNC: NEGATIVE MG/DL
LEUKOCYTE ESTERASE UR QL STRIP: NEGATIVE
MUCOUS THREADS UR QL AUTO: ABNORMAL
NITRITE UR QL STRIP: NEGATIVE
NON-SQ EPI CELLS URNS QL MICRO: ABNORMAL /HPF
PH UR STRIP.AUTO: 7 [PH]
PROT UR STRIP-MCNC: ABNORMAL MG/DL
RBC #/AREA URNS AUTO: ABNORMAL /HPF
SP GR UR STRIP.AUTO: 1.02 (ref 1–1.03)
UROBILINOGEN UR STRIP-ACNC: <2 MG/DL
WBC #/AREA URNS AUTO: ABNORMAL /HPF

## 2022-10-19 PROCEDURE — 87086 URINE CULTURE/COLONY COUNT: CPT | Performed by: NURSE PRACTITIONER

## 2022-10-19 PROCEDURE — 87147 CULTURE TYPE IMMUNOLOGIC: CPT | Performed by: NURSE PRACTITIONER

## 2022-10-19 PROCEDURE — 81001 URINALYSIS AUTO W/SCOPE: CPT | Performed by: NURSE PRACTITIONER

## 2022-10-19 NOTE — PROGRESS NOTES
10/19/2022    Assessment and Plan    23 y o  female managed by our office    1  Back pain   · Ibuprofen as needed for discomfort  · Ice/Heat as needed for comfort  · Defer to PCP for further evaluation and intervention     2  Urinary Tract Infection   · Most recent UA performed earlier today unremarkable   · Awaiting results of most recent UCx  · Will provide antibiotic therapy based on results of pending urine culture    3  Hx punctate nephrolithiasis  · As previously seen on CT abd/pelvis 9/2022- bilateral punctate calculi     History of Present Illness  Elisa Alcantara is a 23 y o  female here for follow up evaluation of  low back pain and discomfort  Patient reports increased pain with movement  She also reports questionable urinary tract infection and recently provided a urine specimen for evaluation earlier today  She reports that she thinks that she feels the bilateral punctate renal calculi moving in her back  She denies fever and chills  She denies nausea vomiting  She currently denies all lower urinary tract symptoms  Review of Systems   Constitutional: Negative for chills and fever  Respiratory: Negative for cough and shortness of breath  Cardiovascular: Negative for chest pain  Gastrointestinal: Negative for abdominal distention, abdominal pain, blood in stool, nausea and vomiting  Genitourinary: Negative for difficulty urinating, dysuria, enuresis, flank pain, frequency, hematuria and urgency  Musculoskeletal: Positive for back pain  Skin: Negative for rash                    Past Medical History  Past Medical History:   Diagnosis Date   • Exercise-induced asthma        Past Social History  Past Surgical History:   Procedure Laterality Date   • APPENDECTOMY     • WISDOM TOOTH EXTRACTION       Social History     Tobacco Use   Smoking Status Never Smoker   Smokeless Tobacco Never Used       Past Family History  Family History   Problem Relation Age of Onset   • Heart disease Maternal Grandfather        Past Social history  Social History     Socioeconomic History   • Marital status: Single     Spouse name: Not on file   • Number of children: Not on file   • Years of education: Not on file   • Highest education level: Not on file   Occupational History   • Not on file   Tobacco Use   • Smoking status: Never Smoker   • Smokeless tobacco: Never Used   Vaping Use   • Vaping Use: Never used   Substance and Sexual Activity   • Alcohol use: Yes     Comment: social    • Drug use: Never   • Sexual activity: Not Currently     Partners: Male     Birth control/protection: Condom   Other Topics Concern   • Not on file   Social History Narrative    Drinks one to two cups of coffee a day      Social Determinants of Health     Financial Resource Strain: Not on file   Food Insecurity: Not on file   Transportation Needs: Not on file   Physical Activity: Not on file   Stress: Not on file   Social Connections: Not on file   Intimate Partner Violence: Not on file   Housing Stability: Not on file       Current Medications  Current Outpatient Medications   Medication Sig Dispense Refill   • albuterol (PROVENTIL HFA,VENTOLIN HFA) 90 mcg/act inhaler Inhale 2 puffs every 6 (six) hours as needed for wheezing 18 g 5   • ascorbic acid (VITAMIN C) 500 MG tablet Take 500 mg by mouth     • fluticasone (FLONASE) 50 mcg/act nasal spray 1 spray into each nostril daily 15 8 mL 5   • Multiple Vitamin (Multi-Day) TABS Take 1 tablet by mouth     • norethindrone-ethinyl estradiol (Junel 1/20) 1-20 MG-MCG per tablet Take 1 tablet by mouth daily 84 tablet 0   • Probiotic Product (Misc Intestinal Vikki Regulat) CAPS Take by mouth     • Symbicort 160-4 5 MCG/ACT inhaler Inhale 2 puffs 2 (two) times a day Rinse mouth after use   30 6 g 5   • amoxicillin (AMOXIL) 875 mg tablet Take 875 mg by mouth 2 (two) times a day     • meloxicam (Mobic) 15 mg tablet Take 1 tablet (15 mg total) by mouth daily 30 tablet 3   • ondansetron (Zofran ODT) 4 mg disintegrating tablet Take 1 tablet (4 mg total) by mouth every 6 (six) hours as needed for nausea or vomiting 20 tablet 0   • oxyCODONE (Roxicodone) 5 immediate release tablet Take 1 tablet (5 mg total) by mouth every 4 (four) hours as needed for moderate pain for up to 12 doses Max Daily Amount: 30 mg 12 tablet 0     No current facility-administered medications for this visit  Allergies  No Known Allergies      The following portions of the patient's history were reviewed and updated as appropriate: allergies, current medications, past medical history, past social history, past surgical history and problem list       Vitals  Vitals:    10/19/22 1301   BP: 118/70   Pulse: 89   Weight: 99 8 kg (220 lb)   Height: 5' 10" (1 778 m)           Physical Exam  Physical Exam  Vitals reviewed  Constitutional:       General: She is not in acute distress  Appearance: Normal appearance  Cardiovascular:      Heart sounds: Normal heart sounds  Pulmonary:      Effort: Pulmonary effort is normal  No respiratory distress  Breath sounds: Normal breath sounds  Abdominal:      Tenderness: There is no right CVA tenderness or left CVA tenderness  Musculoskeletal:         General: Normal range of motion  Skin:     General: Skin is warm and dry  Neurological:      General: No focal deficit present  Mental Status: She is alert  Psychiatric:         Mood and Affect: Mood normal          Behavior: Behavior normal            Results  No results found for this or any previous visit (from the past 1 hour(s))  ]  No results found for: PSA  Lab Results   Component Value Date    CALCIUM 8 7 09/29/2022    K 3 3 (L) 09/29/2022    CO2 21 09/29/2022     (H) 09/29/2022    BUN 6 09/29/2022    CREATININE 0 75 09/29/2022     Lab Results   Component Value Date    WBC 15 24 (H) 09/29/2022    HGB 13 8 09/29/2022    HCT 42 6 09/29/2022    MCV 90 09/29/2022     09/29/2022     Orders  No orders of the defined types were placed in this encounter        YESSENIA Diaz

## 2022-10-20 LAB
BACTERIA UR CULT: ABNORMAL
BACTERIA UR CULT: ABNORMAL

## 2022-10-23 ENCOUNTER — HOSPITAL ENCOUNTER (EMERGENCY)
Facility: HOSPITAL | Age: 22
Discharge: HOME | End: 2022-10-23
Attending: EMERGENCY MEDICINE
Payer: COMMERCIAL

## 2022-10-23 ENCOUNTER — OFFICE VISIT (OUTPATIENT)
Dept: URGENT CARE | Age: 22
End: 2022-10-23
Payer: COMMERCIAL

## 2022-10-23 VITALS
RESPIRATION RATE: 16 BRPM | HEART RATE: 80 BPM | SYSTOLIC BLOOD PRESSURE: 134 MMHG | OXYGEN SATURATION: 99 % | TEMPERATURE: 97.9 F | DIASTOLIC BLOOD PRESSURE: 74 MMHG

## 2022-10-23 VITALS
OXYGEN SATURATION: 99 % | RESPIRATION RATE: 18 BRPM | SYSTOLIC BLOOD PRESSURE: 127 MMHG | DIASTOLIC BLOOD PRESSURE: 67 MMHG | HEART RATE: 80 BPM | TEMPERATURE: 99.2 F

## 2022-10-23 DIAGNOSIS — R11.2 NAUSEA AND VOMITING: ICD-10-CM

## 2022-10-23 DIAGNOSIS — R10.9 ABDOMINAL PAIN WITH VOMITING: Primary | ICD-10-CM

## 2022-10-23 DIAGNOSIS — R10.10 PAIN OF UPPER ABDOMEN: Primary | ICD-10-CM

## 2022-10-23 DIAGNOSIS — N93.9 VAGINAL BLEEDING: ICD-10-CM

## 2022-10-23 DIAGNOSIS — R11.10 ABDOMINAL PAIN WITH VOMITING: Primary | ICD-10-CM

## 2022-10-23 LAB
ALBUMIN SERPL BCP-MCNC: 3.5 G/DL (ref 3.5–5)
ALP SERPL-CCNC: 75 U/L (ref 46–116)
ALT SERPL W P-5'-P-CCNC: 14 U/L (ref 12–78)
ANION GAP SERPL CALCULATED.3IONS-SCNC: 7 MMOL/L (ref 4–13)
AST SERPL W P-5'-P-CCNC: 9 U/L (ref 5–45)
BASOPHILS # BLD AUTO: 0.08 THOUSANDS/ÂΜL (ref 0–0.1)
BASOPHILS NFR BLD AUTO: 1 % (ref 0–1)
BILIRUB SERPL-MCNC: 0.18 MG/DL (ref 0.2–1)
BUN SERPL-MCNC: 9 MG/DL (ref 5–25)
CALCIUM SERPL-MCNC: 9 MG/DL (ref 8.3–10.1)
CHLORIDE SERPL-SCNC: 108 MMOL/L (ref 96–108)
CO2 SERPL-SCNC: 23 MMOL/L (ref 21–32)
CREAT SERPL-MCNC: 0.65 MG/DL (ref 0.6–1.3)
EOSINOPHIL # BLD AUTO: 0.57 THOUSAND/ÂΜL (ref 0–0.61)
EOSINOPHIL NFR BLD AUTO: 5 % (ref 0–6)
ERYTHROCYTE [DISTWIDTH] IN BLOOD BY AUTOMATED COUNT: 12.2 % (ref 11.6–15.1)
GFR SERPL CREATININE-BSD FRML MDRD: 128 ML/MIN/1.73SQ M
GLUCOSE SERPL-MCNC: 90 MG/DL (ref 65–140)
HCT VFR BLD AUTO: 42.7 % (ref 34.8–46.1)
HGB BLD-MCNC: 13.8 G/DL (ref 11.5–15.4)
IMM GRANULOCYTES # BLD AUTO: 0.04 THOUSAND/UL (ref 0–0.2)
IMM GRANULOCYTES NFR BLD AUTO: 0 % (ref 0–2)
LIPASE SERPL-CCNC: 145 U/L (ref 73–393)
LYMPHOCYTES # BLD AUTO: 2.56 THOUSANDS/ÂΜL (ref 0.6–4.47)
LYMPHOCYTES NFR BLD AUTO: 21 % (ref 14–44)
MCH RBC QN AUTO: 29.4 PG (ref 26.8–34.3)
MCHC RBC AUTO-ENTMCNC: 32.3 G/DL (ref 31.4–37.4)
MCV RBC AUTO: 91 FL (ref 82–98)
MONOCYTES # BLD AUTO: 0.73 THOUSAND/ÂΜL (ref 0.17–1.22)
MONOCYTES NFR BLD AUTO: 6 % (ref 4–12)
NEUTROPHILS # BLD AUTO: 7.98 THOUSANDS/ÂΜL (ref 1.85–7.62)
NEUTS SEG NFR BLD AUTO: 67 % (ref 43–75)
NRBC BLD AUTO-RTO: 0 /100 WBCS
PLATELET # BLD AUTO: 403 THOUSANDS/UL (ref 149–390)
PMV BLD AUTO: 10.5 FL (ref 8.9–12.7)
POTASSIUM SERPL-SCNC: 3.7 MMOL/L (ref 3.5–5.3)
PROT SERPL-MCNC: 8.1 G/DL (ref 6.4–8.4)
RBC # BLD AUTO: 4.7 MILLION/UL (ref 3.81–5.12)
SL AMB  POCT GLUCOSE, UA: NEGATIVE
SL AMB LEUKOCYTE ESTERASE,UA: NEGATIVE
SL AMB POCT BILIRUBIN,UA: NEGATIVE
SL AMB POCT BLOOD,UA: ABNORMAL
SL AMB POCT CLARITY,UA: ABNORMAL
SL AMB POCT COLOR,UA: YELLOW
SL AMB POCT KETONES,UA: NEGATIVE
SL AMB POCT NITRITE,UA: NEGATIVE
SL AMB POCT PH,UA: 7
SL AMB POCT SPECIFIC GRAVITY,UA: 1.02
SL AMB POCT URINE HCG: NEGATIVE
SL AMB POCT URINE PROTEIN: ABNORMAL
SL AMB POCT UROBILINOGEN: 0.2
SODIUM SERPL-SCNC: 138 MMOL/L (ref 135–147)
WBC # BLD AUTO: 11.96 THOUSAND/UL (ref 4.31–10.16)

## 2022-10-23 PROCEDURE — 87491 CHLMYD TRACH DNA AMP PROBE: CPT

## 2022-10-23 PROCEDURE — 80053 COMPREHEN METABOLIC PANEL: CPT

## 2022-10-23 PROCEDURE — 81002 URINALYSIS NONAUTO W/O SCOPE: CPT

## 2022-10-23 PROCEDURE — 36415 COLL VENOUS BLD VENIPUNCTURE: CPT

## 2022-10-23 PROCEDURE — 76775 US EXAM ABDO BACK WALL LIM: CPT | Performed by: EMERGENCY MEDICINE

## 2022-10-23 PROCEDURE — G0382 LEV 3 HOSP TYPE B ED VISIT: HCPCS

## 2022-10-23 PROCEDURE — 87591 N.GONORRHOEAE DNA AMP PROB: CPT

## 2022-10-23 PROCEDURE — 85025 COMPLETE CBC W/AUTO DIFF WBC: CPT

## 2022-10-23 PROCEDURE — 99284 EMERGENCY DEPT VISIT MOD MDM: CPT | Performed by: EMERGENCY MEDICINE

## 2022-10-23 PROCEDURE — 81025 URINE PREGNANCY TEST: CPT

## 2022-10-23 PROCEDURE — 83690 ASSAY OF LIPASE: CPT

## 2022-10-23 RX ORDER — SUCRALFATE 1 G/1
1 TABLET ORAL 4 TIMES DAILY
Qty: 12 TABLET | Refills: 0 | Status: SHIPPED | OUTPATIENT
Start: 2022-10-23 | End: 2022-10-26

## 2022-10-23 RX ORDER — METOCLOPRAMIDE HYDROCHLORIDE 5 MG/ML
10 INJECTION INTRAMUSCULAR; INTRAVENOUS ONCE
Status: COMPLETED | OUTPATIENT
Start: 2022-10-23 | End: 2022-10-23

## 2022-10-23 RX ORDER — LIDOCAINE HYDROCHLORIDE 20 MG/ML
15 SOLUTION OROPHARYNGEAL ONCE
Status: COMPLETED | OUTPATIENT
Start: 2022-10-23 | End: 2022-10-23

## 2022-10-23 RX ORDER — ACETAMINOPHEN 325 MG/1
650 TABLET ORAL ONCE
Status: COMPLETED | OUTPATIENT
Start: 2022-10-23 | End: 2022-10-23

## 2022-10-23 RX ORDER — ONDANSETRON HYDROCHLORIDE 4 MG/1
4 TABLET, ORALLY DISINTEGRATING ORAL EVERY 6 HOURS PRN
Qty: 20 TABLET | Refills: 0 | Status: SHIPPED | OUTPATIENT
Start: 2022-10-23

## 2022-10-23 RX ORDER — MAGNESIUM HYDROXIDE/ALUMINUM HYDROXICE/SIMETHICONE 120; 1200; 1200 MG/30ML; MG/30ML; MG/30ML
30 SUSPENSION ORAL ONCE
Status: COMPLETED | OUTPATIENT
Start: 2022-10-23 | End: 2022-10-23

## 2022-10-23 RX ORDER — KETOROLAC TROMETHAMINE 30 MG/ML
15 INJECTION, SOLUTION INTRAMUSCULAR; INTRAVENOUS ONCE
Status: COMPLETED | OUTPATIENT
Start: 2022-10-23 | End: 2022-10-23

## 2022-10-23 RX ORDER — ONDANSETRON 2 MG/ML
4 INJECTION INTRAMUSCULAR; INTRAVENOUS ONCE
Status: COMPLETED | OUTPATIENT
Start: 2022-10-23 | End: 2022-10-23

## 2022-10-23 RX ADMIN — SODIUM CHLORIDE 1000 ML: 0.9 INJECTION, SOLUTION INTRAVENOUS at 17:56

## 2022-10-23 RX ADMIN — ONDANSETRON 4 MG: 2 INJECTION INTRAMUSCULAR; INTRAVENOUS at 19:11

## 2022-10-23 RX ADMIN — ACETAMINOPHEN 650 MG: 325 TABLET ORAL at 17:56

## 2022-10-23 RX ADMIN — LIDOCAINE HYDROCHLORIDE 15 ML: 20 SOLUTION ORAL; TOPICAL at 17:56

## 2022-10-23 RX ADMIN — ALUMINUM HYDROXIDE, MAGNESIUM HYDROXIDE, AND SIMETHICONE 30 ML: 200; 200; 20 SUSPENSION ORAL at 17:56

## 2022-10-23 RX ADMIN — METOCLOPRAMIDE HYDROCHLORIDE 10 MG: 5 INJECTION INTRAMUSCULAR; INTRAVENOUS at 17:56

## 2022-10-23 RX ADMIN — KETOROLAC TROMETHAMINE 15 MG: 30 INJECTION, SOLUTION INTRAMUSCULAR; INTRAVENOUS at 17:55

## 2022-10-23 NOTE — PROGRESS NOTES
[address] [employer] [facility]        NAME: Patricia Manzanares is a 23 y o  female  : 2000    MRN: [id number]  DATE: 2022  TIME: 4:06 PM    Assessment and Plan   Abdominal pain with vomiting [R10 9, R11 10]  1  Abdominal pain with vomiting  POCT urine dip    POCT urine HCG   2  Vaginal bleeding       Urine pregnancy negative, urine dip positive for large amount of blood for leukocytes or nitrites  Patient states that she has a history of kidney stones, but states that this pain and the symptoms are different than previous kidney stones  Patient sent to the emergency department for further workup and evaluation of acute vomiting, abdominal pain and vaginal bleeding  She agrees with this plan of care  All questions and concerns were addressed at this time  Patient Instructions       Please go to [address] for further workup and evaluation of acute abdominal symptoms  Chief Complaint     Chief Complaint   Patient presents with   • Abdominal Pain     For over 2 weeks   • Vomiting         History of Present Illness       Patient presenting for evaluation of abdominal pain, flank pain, vomiting and vaginal bleeding  Patient states that 2 weeks ago she was seen in the emergency department treated for a kidney infection  Patient states that she completed antibiotic therapy 1 week ago  She states that 6 days ago she began with abdominal pain and vomiting  She states that she was seen at her [facility], and they treated her or for gastritis with Zofran and "acid reducing" medication  Patient states that she has been taking these medications as prescribed, but is still having vomiting  She denies any bile or blood in her emesis  She states that she is mainly vomiting undigested food  Patient states that she also started having vaginal bleeding, but is not due for her period 2 weeks    She denies any chest pain, shortness a breath, fevers or chills at this time       Review of Systems   Review of Systems   Constitutional: Negative for chills and fever  HENT: Negative for ear pain and sore throat  Eyes: Negative for pain and visual disturbance  Respiratory: Negative for cough and shortness of breath  Cardiovascular: Negative for chest pain and palpitations  Gastrointestinal: Positive for abdominal pain and vomiting  Genitourinary: Positive for flank pain and vaginal bleeding  Negative for dysuria and hematuria  Musculoskeletal: Negative for arthralgias and back pain  Skin: Negative for color change and rash  Neurological: Negative for seizures and syncope  All other systems reviewed and are negative          Current Medications       Current Outpatient Medications:   •  albuterol (PROVENTIL HFA,VENTOLIN HFA) 90 mcg/act inhaler, Inhale 2 puffs every 6 (six) hours as needed for wheezing, Disp: 18 g, Rfl: 5  •  amoxicillin (AMOXIL) 875 mg tablet, Take 875 mg by mouth 2 (two) times a day, Disp: , Rfl:   •  ascorbic acid (VITAMIN C) 500 MG tablet, Take 500 mg by mouth, Disp: , Rfl:   •  fluticasone (FLONASE) 50 mcg/act nasal spray, 1 spray into each nostril daily, Disp: 15 8 mL, Rfl: 5  •  meloxicam (Mobic) 15 mg tablet, Take 1 tablet (15 mg total) by mouth daily, Disp: 30 tablet, Rfl: 3  •  Multiple Vitamin (Multi-Day) TABS, Take 1 tablet by mouth, Disp: , Rfl:   •  norethindrone-ethinyl estradiol (Junel 1/20) 1-20 MG-MCG per tablet, Take 1 tablet by mouth daily, Disp: 84 tablet, Rfl: 0  •  ondansetron (Zofran ODT) 4 mg disintegrating tablet, Take 1 tablet (4 mg total) by mouth every 6 (six) hours as needed for nausea or vomiting, Disp: 20 tablet, Rfl: 0  •  oxyCODONE (Roxicodone) 5 immediate release tablet, Take 1 tablet (5 mg total) by mouth every 4 (four) hours as needed for moderate pain for up to 12 doses Max Daily Amount: 30 mg, Disp: 12 tablet, Rfl: 0  •  Probiotic Product (Misc Intestinal Vikki Regulat) CAPS, Take by mouth, Disp: , Rfl:   • Symbicort 160-4 5 MCG/ACT inhaler, Inhale 2 puffs 2 (two) times a day Rinse mouth after use , Disp: 30 6 g, Rfl: 5    Current Allergies     Allergies as of 10/23/2022   • (No Known Allergies)            The following portions of the patient's history were reviewed and updated as appropriate: allergies, current medications, past family history, past medical history, past social history, past surgical history and problem list      Past Medical History:   Diagnosis Date   • Exercise-induced asthma        Past Surgical History:   Procedure Laterality Date   • APPENDECTOMY     • WISDOM TOOTH EXTRACTION         Family History   Problem Relation Age of Onset   • Heart disease Maternal Grandfather          Medications have been verified  Objective   /74 (BP Location: Left arm, Patient Position: Sitting, Cuff Size: Large)   Pulse 80   Temp 97 9 °F (36 6 °C) (Temporal)   Resp 16   SpO2 99%        Physical Exam     Physical Exam  Vitals and nursing note reviewed  Constitutional:       General: She is not in acute distress  Appearance: Normal appearance  She is well-developed and normal weight  She is not ill-appearing  HENT:      Head: Normocephalic and atraumatic  Right Ear: Tympanic membrane normal       Left Ear: Tympanic membrane normal       Nose: Nose normal  No congestion or rhinorrhea  Mouth/Throat:      Mouth: Mucous membranes are moist       Pharynx: Oropharynx is clear  No oropharyngeal exudate or posterior oropharyngeal erythema  Eyes:      Extraocular Movements: Extraocular movements intact  Conjunctiva/sclera: Conjunctivae normal       Pupils: Pupils are equal, round, and reactive to light  Cardiovascular:      Rate and Rhythm: Normal rate and regular rhythm  Pulses: Normal pulses  Heart sounds: Normal heart sounds  No murmur heard  No friction rub  No gallop  Pulmonary:      Effort: No respiratory distress  Breath sounds: Normal breath sounds   No wheezing, rhonchi or rales  Abdominal:      General: Abdomen is flat  Bowel sounds are normal  There is no distension  Palpations: Abdomen is soft  There is no shifting dullness or mass  Tenderness: There is abdominal tenderness in the epigastric area, periumbilical area and left upper quadrant  There is right CVA tenderness  There is no left CVA tenderness, guarding or rebound  Hernia: No hernia is present  Musculoskeletal:         General: No tenderness  Normal range of motion  Cervical back: Normal range of motion and neck supple  No tenderness  Skin:     General: Skin is warm and dry  Capillary Refill: Capillary refill takes less than 2 seconds  Neurological:      General: No focal deficit present  Mental Status: She is alert and oriented to person, place, and time     Psychiatric:         Mood and Affect: Mood normal          Behavior: Behavior normal

## 2022-10-23 NOTE — ED ATTENDING ATTESTATION
10/23/2022  ISulema MD, saw and evaluated the patient  I have discussed the patient with the resident/non-physician practitioner and agree with the resident's/non-physician practitioner's findings, Plan of Care, and MDM as documented in the resident's/non-physician practitioner's note, except where noted  All available labs and Radiology studies were reviewed  I was present for key portions of any procedure(s) performed by the resident/non-physician practitioner and I was immediately available to provide assistance  At this point I agree with the current assessment done in the Emergency Department    I have conducted an independent evaluation of this patient a history and physical is as follows:  abd pain  Nausea and vomiting   Saw health clinic and urgent care today   Given zofran   H/o recent pyelonephritis   Has known kidney stone in past     Appendectomy   Patient is currently menstruating  Patient had been treated with Keflex  Exam no acute distress lungs clear heart regular abdomen is positive bowel sounds mild left upper quadrant tenderness without rebound or guarding  Impression abdominal pain  ED Course         Critical Care Time  Procedures

## 2022-10-23 NOTE — DISCHARGE INSTRUCTIONS
Call  [facility] to schedule an appointment  Try to stay hydrated as best as possible  Return to ER experience any persistent vomiting or worsening abdominal pain   Carafate prescription from pharmacy as well as Maalox off-the-shelf  Used together as directed if you continue to have abdominal pain  Continue to take omeprazole as directed

## 2022-10-23 NOTE — ED PROVIDER NOTES
History  Chief Complaint   Patient presents with   • Abdominal Pain     Pt states she recently was dx with a kidney infection, pt finished abx 9 day ago, then 2 days later pt started with abdominal pain, n/v and constipation  Pt also started with vaginal bleeding today but states she is on birth control and is not supposed to have her period for 2 more weeks  Pt health center was treating her for gastritis but states she hasn't had any relief and has been unable to keep anything down  27-year-old female with history of ureteral stones and pyelonephritis presents with abdominal pain, nausea, and nonbloody nonbilious vomiting x6 days  Abdominal pain described as periumbilical and left upper quadrant, radiates to the right flank, worse after eating, sharp in character, and moderate to severe  History of appendectomy  No other abdominal surgeries  Currently on her menstrual cycle  Prior to Admission Medications   Prescriptions Last Dose Informant Patient Reported? Taking? Multiple Vitamin (Multi-Day) TABS  Self Yes No   Sig: Take 1 tablet by mouth   Probiotic Product (Misc Intestinal Vikki Regulat) CAPS  Self Yes No   Sig: Take by mouth   Symbicort 160-4 5 MCG/ACT inhaler  Self No No   Sig: Inhale 2 puffs 2 (two) times a day Rinse mouth after use     albuterol (PROVENTIL HFA,VENTOLIN HFA) 90 mcg/act inhaler  Self No No   Sig: Inhale 2 puffs every 6 (six) hours as needed for wheezing   amoxicillin (AMOXIL) 875 mg tablet  Self Yes No   Sig: Take 875 mg by mouth 2 (two) times a day   ascorbic acid (VITAMIN C) 500 MG tablet  Self Yes No   Sig: Take 500 mg by mouth   fluticasone (FLONASE) 50 mcg/act nasal spray  Self No No   Si spray into each nostril daily   meloxicam (Mobic) 15 mg tablet  Self No No   Sig: Take 1 tablet (15 mg total) by mouth daily   norethindrone-ethinyl estradiol (Junel ) 1-20 MG-MCG per tablet  Self No No   Sig: Take 1 tablet by mouth daily   oxyCODONE (Roxicodone) 5 immediate release tablet  Self No No   Sig: Take 1 tablet (5 mg total) by mouth every 4 (four) hours as needed for moderate pain for up to 12 doses Max Daily Amount: 30 mg      Facility-Administered Medications: None       Past Medical History:   Diagnosis Date   • Exercise-induced asthma        Past Surgical History:   Procedure Laterality Date   • APPENDECTOMY     • WISDOM TOOTH EXTRACTION         Family History   Problem Relation Age of Onset   • Heart disease Maternal Grandfather      I have reviewed and agree with the history as documented  E-Cigarette/Vaping   • E-Cigarette Use Never User      E-Cigarette/Vaping Substances   • Nicotine No    • THC No    • CBD No    • Flavoring No    • Other No    • Unknown No      Social History     Tobacco Use   • Smoking status: Never Smoker   • Smokeless tobacco: Never Used   Vaping Use   • Vaping Use: Never used   Substance Use Topics   • Alcohol use: Yes     Comment: social    • Drug use: Never        Review of Systems   Constitutional: Negative for chills and fever  HENT: Negative for ear pain and sore throat  Eyes: Negative for pain and visual disturbance  Respiratory: Negative for cough and shortness of breath  Cardiovascular: Negative for chest pain and palpitations  Gastrointestinal: Positive for abdominal pain, nausea and vomiting  Genitourinary: Negative for dysuria and hematuria  Musculoskeletal: Negative for arthralgias and back pain  Skin: Negative for color change and rash  Neurological: Negative for seizures and syncope  All other systems reviewed and are negative        Physical Exam  ED Triage Vitals   Temperature Pulse Respirations Blood Pressure SpO2   10/23/22 1648 10/23/22 1646 10/23/22 1646 10/23/22 1646 10/23/22 1646   99 2 °F (37 3 °C) 86 18 142/78 98 %      Temp src Heart Rate Source Patient Position - Orthostatic VS BP Location FiO2 (%)   -- 10/23/22 1646 10/23/22 1646 10/23/22 1646 --    Monitor Sitting Right arm       Pain Score       10/23/22 1646       7             Orthostatic Vital Signs  Vitals:    10/23/22 1646 10/23/22 1850 10/23/22 1900   BP: 142/78 127/67 127/67   Pulse: 86 76 80   Patient Position - Orthostatic VS: Sitting  Lying       Physical Exam  Vitals and nursing note reviewed  Constitutional:       General: She is not in acute distress  Appearance: Normal appearance  She is not ill-appearing, toxic-appearing or diaphoretic  HENT:      Head: Normocephalic and atraumatic  Right Ear: External ear normal       Left Ear: External ear normal       Nose: Nose normal       Mouth/Throat:      Mouth: Mucous membranes are moist       Pharynx: Oropharynx is clear  Eyes:      General:         Right eye: No discharge  Left eye: No discharge  Extraocular Movements: Extraocular movements intact  Conjunctiva/sclera: Conjunctivae normal    Cardiovascular:      Rate and Rhythm: Normal rate and regular rhythm  Pulses: Normal pulses  Heart sounds: Normal heart sounds  No murmur heard  No friction rub  Pulmonary:      Effort: Pulmonary effort is normal  No respiratory distress  Breath sounds: Normal breath sounds  No wheezing or rales  Abdominal:      General: Abdomen is flat  Bowel sounds are normal  There is no distension  Palpations: Abdomen is soft  Tenderness: There is abdominal tenderness in the periumbilical area and left upper quadrant  There is right CVA tenderness  There is no left CVA tenderness, guarding or rebound  Negative signs include Bunch's sign, Rovsing's sign, McBurney's sign, psoas sign and obturator sign  Musculoskeletal:         General: Normal range of motion  Cervical back: Normal range of motion and neck supple  Skin:     General: Skin is warm and dry  Capillary Refill: Capillary refill takes less than 2 seconds  Coloration: Skin is not pale  Neurological:      Mental Status: She is alert and oriented to person, place, and time  Psychiatric:         Mood and Affect: Mood normal          Behavior: Behavior normal          ED Medications  Medications   Lidocaine Viscous HCl (XYLOCAINE) 2 % mucosal solution 15 mL (15 mL Swish & Swallow Given 10/23/22 1756)   aluminum-magnesium hydroxide-simethicone (MYLANTA) oral suspension 30 mL (30 mL Oral Given 10/23/22 1756)   ketorolac (TORADOL) injection 15 mg (15 mg Intravenous Given 10/23/22 1755)   acetaminophen (TYLENOL) tablet 650 mg (650 mg Oral Given 10/23/22 1756)   metoclopramide (REGLAN) injection 10 mg (10 mg Intravenous Given 10/23/22 1756)   sodium chloride 0 9 % bolus 1,000 mL (0 mL Intravenous Stopped 10/23/22 1935)   ondansetron (ZOFRAN) injection 4 mg (4 mg Intravenous Given 10/23/22 1911)       Diagnostic Studies  Results Reviewed     Procedure Component Value Units Date/Time    Comprehensive metabolic panel [[id number]]  (Abnormal) Collected: 10/23/22 1756    Lab Status: Final result Specimen: Blood from Arm, Right Updated: 10/23/22 1833     Sodium 138 mmol/L      Potassium 3 7 mmol/L      Chloride 108 mmol/L      CO2 23 mmol/L      ANION GAP 7 mmol/L      BUN 9 mg/dL      Creatinine 0 65 mg/dL      Glucose 90 mg/dL      Calcium 9 0 mg/dL      AST 9 U/L      ALT 14 U/L      Alkaline Phosphatase 75 U/L      Total Protein 8 1 g/dL      Albumin 3 5 g/dL      Total Bilirubin 0 18 mg/dL      eGFR 128 ml/min/1 73sq m     Narrative:      [place] guidelines for Chronic Kidney Disease (CKD):   •  Stage 1 with normal or high GFR (GFR > 90 mL/min/1 73 square meters)  •  Stage 2 Mild CKD (GFR = 60-89 mL/min/1 73 square meters)  •  Stage 3A Moderate CKD (GFR = 45-59 mL/min/1 73 square meters)  •  Stage 3B Moderate CKD (GFR = 30-44 mL/min/1 73 square meters)  •  Stage 4 Severe CKD (GFR = 15-29 mL/min/1 73 square meters)  •  Stage 5 End Stage CKD (GFR <15 mL/min/1 73 square meters)  Note: GFR calculation is accurate only with a steady state creatinine    Lipase [[id number]] (Normal) Collected: 10/23/22 1756    Lab Status: Final result Specimen: Blood from Arm, Right Updated: 10/23/22 1833     Lipase 145 u/L     CBC and differential [[id number]]  (Abnormal) Collected: 10/23/22 1756    Lab Status: Final result Specimen: Blood from Arm, Right Updated: 10/23/22 1822     WBC 11 96 Thousand/uL      RBC 4 70 Million/uL      Hemoglobin 13 8 g/dL      Hematocrit 42 7 %      MCV 91 fL      MCH 29 4 pg      MCHC 32 3 g/dL      RDW 12 2 %      MPV 10 5 fL      Platelets 832 Thousands/uL      nRBC 0 /100 WBCs      Neutrophils Relative 67 %      Immat GRANS % 0 %      Lymphocytes Relative 21 %      Monocytes Relative 6 %      Eosinophils Relative 5 %      Basophils Relative 1 %      Neutrophils Absolute 7 98 Thousands/µL      Immature Grans Absolute 0 04 Thousand/uL      Lymphocytes Absolute 2 56 Thousands/µL      Monocytes Absolute 0 73 Thousand/µL      Eosinophils Absolute 0 57 Thousand/µL      Basophils Absolute 0 08 Thousands/µL     Chlamydia/GC amplified DNA by PCR [[id number]] Collected: 10/23/22 1756    Lab Status:  In process Specimen: Urine, Other Updated: 10/23/22 1805                 No orders to display         Procedures  POC Renal US    Date/Time: 10/23/2022 6:18 PM  Performed by: Soila Sanford MD  Authorized by: Dony Smith MD     Patient location:  ED  Performed by:  Resident  Other Assisting Provider: No    Procedure details:     Exam Type:  Diagnostic    Indications: flank/back pain      Assessment for:  Suspected hydronephrosis    Views obtained: left kidney and right kidney      Image quality: diagnostic      Image availability:  Images available in PACS, still images obtained and video obtained  Findings:     LEFT kidney findings: renal cyst      LEFT hydronephrosis: none      RIGHT kidney findings: renal cyst      RIGHT hydronephrosis: none    Interpretation:     Renal ultrasound impressions: normal exam            ED Course MDM  Number of Diagnoses or Management Options  Nausea and vomiting: established and worsening  Pain of upper abdomen: established and worsening  Diagnosis management comments: Impression:  Well-appearing 82-year-old female presents with abdominal pain, nausea, and vomiting x1 week  Abdominal pain is postprandial and epigastric suggesting possible gastritis  No right upper quadrant tenderness makes biliary colic unlikely  Patient also has blood in her urine although she is currently on her menstrual cycle as well as flank pain with a history of ureteral stones  No pelvic pain or vaginal discharge to suggest gynecological pathology  History of appendectomy  Plan:  Labs, GC chlamydia, symptomatic treatment, reassessment    Patient reports moderate improvement in abdominal pain and nausea  Tolerating p o  In room  Follow-up with primary doctor or GI doctor       Amount and/or Complexity of Data Reviewed  Clinical lab tests: ordered and reviewed  Review and summarize past medical records: yes    Risk of Complications, Morbidity, and/or Mortality  Presenting problems: moderate  Diagnostic procedures: low  Management options: low    Patient Progress  Patient progress: improved      Disposition  Final diagnoses:   Pain of upper abdomen   Nausea and vomiting     Time reflects when diagnosis was documented in both MDM as applicable and the Disposition within this note     Time User Action Codes Description Comment    10/23/2022  6:38 PM Leva Proffer Add [R10 10] Pain of upper abdomen     10/23/2022  6:38 PM Leva Proffer Add [R11 2] Nausea and vomiting       ED Disposition     ED Disposition   Discharge    Condition   Stable    Date/Time   Sun Oct 23, 2022  6:38 PM    Comment   [place] Lie[place] discharge to home/self care                 Follow-up Information     Follow up With Specialties Details Why Contact Info Additional [facility] Schedule an appointment as soon as possible for a visit   [address] [facility] [address] [place] [address] [phone] [facility], [address] [place] [address], [address], [facility] [place], [address]          Discharge Medication List as of 10/23/2022  7:14 PM      START taking these medications    Details   sucralfate (CARAFATE) 1 g tablet Take 1 tablet (1 g total) by mouth 4 (four) times a day for 12 doses, Starting Sun 10/23/2022, Until Wed 10/26/2022, Normal         CONTINUE these medications which have NOT CHANGED    Details   albuterol (PROVENTIL HFA,VENTOLIN HFA) 90 mcg/act inhaler Inhale 2 puffs every 6 (six) hours as needed for wheezing, Starting Thu 9/1/2022, Normal      amoxicillin (AMOXIL) 875 mg tablet Take 875 mg by mouth 2 (two) times a day, Starting Thu 9/15/2022, Historical Med      ascorbic acid (VITAMIN C) 500 MG tablet Take 500 mg by mouth, Historical Med      fluticasone (FLONASE) 50 mcg/act nasal spray 1 spray into each nostril daily, Starting Thu 9/1/2022, Normal      meloxicam (Mobic) 15 mg tablet Take 1 tablet (15 mg total) by mouth daily, Starting Wed 7/6/2022, Normal      Multiple Vitamin (Multi-Day) TABS Take 1 tablet by mouth, Historical Med      norethindrone-ethinyl estradiol (Junel 1/20) 1-20 MG-MCG per tablet Take 1 tablet by mouth daily, Starting Tue 4/26/2022, Normal      oxyCODONE (Roxicodone) 5 immediate release tablet Take 1 tablet (5 mg total) by mouth every 4 (four) hours as needed for moderate pain for up to 12 doses Max Daily Amount: 30 mg, Starting Sun 7/24/2022, Normal      Probiotic Product (Misc Intestinal Vikki Regulat) CAPS Take by mouth, Historical Med      Symbicort 160-4 5 MCG/ACT inhaler Inhale 2 puffs 2 (two) times a day Rinse mouth after use , Starting Thu 9/1/2022, Normal      ondansetron (Zofran ODT) 4 mg disintegrating tablet Take 1 tablet (4 mg total) by mouth every 6 (six) hours as needed for nausea or vomiting, Starting Thu 9/29/2022, Normal           No discharge procedures on file  PDMP Review     None           ED Provider  Attending physically available and evaluated Olu Arora I managed the patient along with the ED Attending      Electronically Signed by         Gerhardt Aas, MD  10/23/22 4616

## 2022-10-24 LAB
C TRACH DNA SPEC QL NAA+PROBE: NEGATIVE
N GONORRHOEA DNA SPEC QL NAA+PROBE: NEGATIVE

## 2022-10-25 ENCOUNTER — TELEPHONE (OUTPATIENT)
Dept: GASTROENTEROLOGY | Facility: CLINIC | Age: 22
End: 2022-10-25

## 2022-10-25 ENCOUNTER — CONSULT (OUTPATIENT)
Dept: GASTROENTEROLOGY | Facility: CLINIC | Age: 22
End: 2022-10-25
Payer: COMMERCIAL

## 2022-10-25 VITALS
TEMPERATURE: 98.2 F | DIASTOLIC BLOOD PRESSURE: 64 MMHG | WEIGHT: 232.4 LBS | HEIGHT: 70 IN | SYSTOLIC BLOOD PRESSURE: 126 MMHG | BODY MASS INDEX: 33.27 KG/M2

## 2022-10-25 DIAGNOSIS — R11.0 NAUSEA: Primary | ICD-10-CM

## 2022-10-25 PROCEDURE — 99204 OFFICE O/P NEW MOD 45 MIN: CPT | Performed by: INTERNAL MEDICINE

## 2022-10-25 RX ORDER — SODIUM CHLORIDE 9 MG/ML
125 INJECTION, SOLUTION INTRAVENOUS CONTINUOUS
Status: CANCELLED | OUTPATIENT
Start: 2022-10-25

## 2022-10-25 RX ORDER — PROMETHAZINE HYDROCHLORIDE 12.5 MG/1
12.5 TABLET ORAL EVERY 6 HOURS PRN
Qty: 30 TABLET | Refills: 0 | Status: SHIPPED | OUTPATIENT
Start: 2022-10-25

## 2022-10-25 NOTE — PROGRESS NOTES
[facility] - Outpatient Consultation  Cristy Bailon 23 y o  female MRN: [id number]  Encounter: [id number]          ASSESSMENT AND PLAN:      1  Nausea and vomiting   2  Abdominal pain    Unsure of etiology  Patient takes NSAIDs  Could have peptic ulcer disease, gastric outlet obstruction  Plan to do EGD  Patient agreeable to get procedure done tomorrow  Procedure ordered and scheduled  Continue nausea medication  Prescribed Phenergan if needed  Further management plan based on investigation results  RTC 3 months  Corky De Souza MD  Gastroenterology  [place] [address]  Date: October 25, 2022    - promethazine (PHENERGAN) 12 5 MG tablet; Take 1 tablet (12 5 mg total) by mouth every 6 (six) hours as needed for nausea or vomiting  Dispense: 30 tablet; Refill: 0    ______________________________________________________________________    HPI:  29-year-old with asthma, allergic rhinitis, obesity presents for evaluation  Patient reports that for the last 1 week she has had nausea and vomiting as well as abdominal pain  She is mostly not been able to keep anything down  Denies any dizziness, blackouts a loss of consciousness  Before the 1 week she was not having any symptoms  She had a similar episode of symptoms in summer which resolved spontaneously  Primary care physician did a workup at that time which was negative  She was prescribed nausea medication during current episode from the ER which she is taking but has not helped  Denies any heartburn  Bowel movements are normal but should her last bowel movement was 1 week ago  She has had weight gain in the last 1 year  Rare alcohol intake  Takes Advil during periods  Nonsmoker  Denies intake of marijuana  Family history unknown for gastrointestinal cancers  REVIEW OF SYSTEMS:    CONSTITUTIONAL: Denies any fever, chills, rigors, and weight loss  HEENT: No earache or tinnitus   Denies hearing loss or visual disturbances  CARDIOVASCULAR: No chest pain or palpitations  RESPIRATORY: Denies any cough, hemoptysis, shortness of breath or dyspnea on exertion  GASTROINTESTINAL: As noted in the History of Present Illness  GENITOURINARY: No problems with urination  Denies any hematuria or dysuria  NEUROLOGIC: No dizziness or vertigo, denies headaches  MUSCULOSKELETAL: Denies any muscle or joint pain  SKIN: Denies skin rashes or itching  ENDOCRINE: Denies excessive thirst  Denies intolerance to heat or cold  PSYCHOSOCIAL: Denies depression or anxiety  Denies any recent memory loss         Historical Information   Past Medical History:   Diagnosis Date   • Exercise-induced asthma      Past Surgical History:   Procedure Laterality Date   • APPENDECTOMY     • WISDOM TOOTH EXTRACTION       Social History   Social History     Substance and Sexual Activity   Alcohol Use Yes    Comment: social      Social History     Substance and Sexual Activity   Drug Use Never     Social History     Tobacco Use   Smoking Status Never Smoker   Smokeless Tobacco Never Used     Family History   Problem Relation Age of Onset   • Heart disease Maternal Grandfather        Meds/Allergies       Current Outpatient Medications:   •  albuterol (PROVENTIL HFA,VENTOLIN HFA) 90 mcg/act inhaler  •  amoxicillin (AMOXIL) 875 mg tablet  •  ascorbic acid (VITAMIN C) 500 MG tablet  •  fluticasone (FLONASE) 50 mcg/act nasal spray  •  meloxicam (Mobic) 15 mg tablet  •  Multiple Vitamin (Multi-Day) TABS  •  norethindrone-ethinyl estradiol (Junel 1/20) 1-20 MG-MCG per tablet  •  oxyCODONE (Roxicodone) 5 immediate release tablet  •  Probiotic Product (Misc Intestinal Vikki Regulat) CAPS  •  promethazine (PHENERGAN) 12 5 MG tablet  •  sucralfate (CARAFATE) 1 g tablet  •  Symbicort 160-4 5 MCG/ACT inhaler  •  Zofran ODT 4 MG disintegrating tablet    No Known Allergies        Objective     Blood pressure 126/64, temperature 98 2 °F (36 8 °C), temperature source Tympanic, height 5' 10" (1 778 m), weight 105 kg (232 lb 6 4 oz), last menstrual period 10/23/2022  Body mass index is 33 35 kg/m²  PHYSICAL EXAM:      General Appearance:   Alert, cooperative, no distress   HEENT:   Normocephalic, atraumatic, anicteric      Neck:  Supple, symmetrical, trachea midline   Lungs:   Clear to auscultation bilaterally; no rales, rhonchi or wheezing; respirations unlabored    Heart[de-identified]   Regular rate and rhythm; no murmur, rub, or gallop  Abdomen:   Soft, non-tender, non-distended; normal bowel sounds; no masses, no organomegaly    Genitalia:   Deferred    Rectal:   Deferred    Extremities:  No cyanosis, clubbing or edema    Pulses:  2+ and symmetric    Skin:  No jaundice, rashes, or lesions    Lymph nodes:  No palpable cervical lymphadenopathy        Lab Results:   No visits with results within 1 Day(s) from this visit     Latest known visit with results is:   Admission on 10/23/2022, Discharged on 10/23/2022   Component Date Value   • N gonorrhoeae, DNA Probe 10/23/2022 Negative    • Chlamydia trachomatis, D* 10/23/2022 Negative    • WBC 10/23/2022 11 96 (A)   • RBC 10/23/2022 4 70    • Hemoglobin 10/23/2022 13 8    • Hematocrit 10/23/2022 42 7    • MCV 10/23/2022 91    • MCH 10/23/2022 29 4    • MCHC 10/23/2022 32 3    • RDW 10/23/2022 12 2    • MPV 10/23/2022 10 5    • Platelets 68/69/7614 403 (A)   • nRBC 10/23/2022 0    • Neutrophils Relative 10/23/2022 67    • Immat GRANS % 10/23/2022 0    • Lymphocytes Relative 10/23/2022 21    • Monocytes Relative 10/23/2022 6    • Eosinophils Relative 10/23/2022 5    • Basophils Relative 10/23/2022 1    • Neutrophils Absolute 10/23/2022 7 98 (A)   • Immature Grans Absolute 10/23/2022 0 04    • Lymphocytes Absolute 10/23/2022 2 56    • Monocytes Absolute 10/23/2022 0 73    • Eosinophils Absolute 10/23/2022 0 57    • Basophils Absolute 10/23/2022 0 08    • Sodium 10/23/2022 138    • Potassium 10/23/2022 3 7    • Chloride 10/23/2022 108    • CO2 10/23/2022 23    • ANION GAP 10/23/2022 7    • BUN 10/23/2022 9    • Creatinine 10/23/2022 0 65    • Glucose 10/23/2022 90    • Calcium 10/23/2022 9 0    • AST 10/23/2022 9    • ALT 10/23/2022 14    • Alkaline Phosphatase 10/23/2022 75    • Total Protein 10/23/2022 8 1    • Albumin 10/23/2022 3 5    • Total Bilirubin 10/23/2022 0 18 (A)   • eGFR 10/23/2022 128    • Lipase 10/23/2022 145          Radiology Results:   CT soft tissue neck with contrast    Result Date: 9/29/2022  Narrative: CT NECK WITH CONTRAST INDICATION:   Sore throat and left tonsillar swelling  COMPARISON:  None  TECHNIQUE:  Axial, sagittal, and coronal 2D reformatted images were created from the axial source data and submitted for interpretation  Radiation dose length product (DLP) for this visit:  663 26 mGy-cm   This examination, like all CT scans performed in the [facility], was performed utilizing techniques to minimize radiation dose exposure, including the use of iterative  reconstruction and automated exposure control  IV Contrast:  100 mL of iohexol (OMNIPAQUE) IMAGE QUALITY:  Diagnostic  FINDINGS: VISUALIZED BRAIN PARENCHYMA:  Normal enhancement  VISUALIZED ORBITS AND PARANASAL SINUSES:  Intact globes and orbits  Opacification of the right anterior and left ethmoid air cells  Complete opacification of the right maxillary sinus  Mucous retention cyst in the left maxillary sinus  NASAL CAVITY AND NASOPHARYNX:  Mild nasopharyngeal inflammation and enlargement of the adenoid tonsils  Patent nasal cavity  SUPRAHYOID NECK:  Enlargement of the palatine tonsils were heterogeneous attenuation suggesting microabscesses and phlegmon  Mild enlargement of the lingual tonsils  No retropharyngeal inflammation  INFRAHYOID NECK:  Epiglottis, aryepiglottic folds and piriform sinuses are normal   Normal glottis and subglottic airway  THYROID GLAND:  Unremarkable   PAROTID AND SUBMANDIBULAR GLANDS:  Normal  LYMPH NODES:  Enlarged bilateral anterior cervical lymph nodes, likely reactive  VASCULAR STRUCTURES:  Normal enhancement of the cervical vasculature  THORACIC INLET:  Clear lung apices  BONY STRUCTURES: No acute fracture or destructive osseous lesion  Impression: Findings consistent with acute pharyngitis  Enlargement of the palatine tonsils, containing phlegmon and/or microabscesses  Workstation performed: [id number]     CT abdomen pelvis with contrast    Result Date: 9/29/2022  Narrative: CT ABDOMEN AND PELVIS WITH IV CONTRAST INDICATION:   Flank pain; Epigastric pain  COMPARISON:  CT abdomen pelvis dated 7/24/2022  TECHNIQUE:  CT examination of the abdomen and pelvis was performed  Axial, sagittal, and coronal 2D reformatted images were created from the source data and submitted for interpretation  Radiation dose length product (DLP) for this visit:  728 55 mGy-cm   This examination, like all CT scans performed in the [facility], was performed utilizing techniques to minimize radiation dose exposure, including the use of iterative  reconstruction and automated exposure control  IV Contrast:  100 mL of iohexol (OMNIPAQUE) Enteric Contrast:  Enteric contrast was not administered  FINDINGS: ABDOMEN LOWER CHEST:  Scattered groundglass opacities in the bilateral lower lung fields, could represent atelectasis or infectious or inflammatory pneumonitis  Correlation with patient's symptoms and laboratory values recommended  The heart appears normal in size  LIVER/BILIARY TREE:  Unremarkable  GALLBLADDER:  No calcified gallstones  No pericholecystic inflammatory change  SPLEEN:  Unremarkable  PANCREAS:  Unremarkable  ADRENAL GLANDS:  Unremarkable  KIDNEYS/URETERS:  Multiple tiny nonobstructing stones in the bilateral kidneys  Several bilateral renal cortical and parapelvic cysts, largest on the right measures approximately 2 5 cm in size    Bilateral kidneys otherwise appear grossly unremarkable; no hydronephrosis  STOMACH AND BOWEL:  Unremarkable  APPENDIX:  There are expected postoperative changes of appendectomy  ABDOMINOPELVIC CAVITY:  No ascites  No pneumoperitoneum  No lymphadenopathy  VESSELS:  Unremarkable for patient's age  PELVIS REPRODUCTIVE ORGANS:  Unremarkable for patient's age  URINARY BLADDER:  Partially distended bladder  Mild circumferential bladder wall thickening noted, probably exaggerated by underdistention  Superimposed cystitis considered in the appropriate clinical setting  ABDOMINAL WALL/INGUINAL REGIONS:  Unremarkable  OSSEOUS STRUCTURES:  No acute fracture or destructive osseous lesion  Impression: Partially distended bladder  Mild circumferential bladder wall thickening noted, probably exaggerated by underdistention  Superimposed cystitis considered in the appropriate clinical setting  Scattered groundglass opacities in the bilateral lower lung fields, could represent atelectasis or infectious or inflammatory pneumonitis  Correlation with patient's symptoms and laboratory values recommended  Bilateral nephrolithiasis, no hydronephrosis  Bilateral renal cysts, and other findings as above  Workstation performed: [id number]     US bedside procedure    Result Date: 10/23/2022  Narrative: 1 [id number]  2 [phone] [id number] 1

## 2022-10-25 NOTE — TELEPHONE ENCOUNTER
Left message for patient if she is interested in a sooner opening for an office visit that we have openings on 11/1/2022 and 11/2/2022 at [facility] office with Dr Katiuska Del Angel  Back line phone number given

## 2022-10-26 ENCOUNTER — ANESTHESIA (OUTPATIENT)
Dept: GASTROENTEROLOGY | Facility: MEDICAL CENTER | Age: 22
End: 2022-10-26

## 2022-10-26 ENCOUNTER — ANESTHESIA EVENT (OUTPATIENT)
Dept: GASTROENTEROLOGY | Facility: MEDICAL CENTER | Age: 22
End: 2022-10-26

## 2022-10-26 ENCOUNTER — APPOINTMENT (OUTPATIENT)
Dept: LAB | Facility: MEDICAL CENTER | Age: 22
End: 2022-10-26
Payer: COMMERCIAL

## 2022-10-26 ENCOUNTER — HOSPITAL ENCOUNTER (OUTPATIENT)
Dept: GASTROENTEROLOGY | Facility: MEDICAL CENTER | Age: 22
Setting detail: OUTPATIENT SURGERY
Discharge: HOME | End: 2022-10-26

## 2022-10-26 VITALS
BODY MASS INDEX: 33.14 KG/M2 | RESPIRATION RATE: 18 BRPM | HEART RATE: 68 BPM | SYSTOLIC BLOOD PRESSURE: 115 MMHG | WEIGHT: 231.48 LBS | OXYGEN SATURATION: 98 % | DIASTOLIC BLOOD PRESSURE: 79 MMHG | TEMPERATURE: 98.2 F | HEIGHT: 70 IN

## 2022-10-26 DIAGNOSIS — K27.9 PUD (PEPTIC ULCER DISEASE): ICD-10-CM

## 2022-10-26 DIAGNOSIS — R11.2 NAUSEA AND VOMITING, UNSPECIFIED VOMITING TYPE: ICD-10-CM

## 2022-10-26 DIAGNOSIS — K27.9 PUD (PEPTIC ULCER DISEASE): Primary | ICD-10-CM

## 2022-10-26 PROBLEM — E66.811 OBESITY (BMI 30.0-34.9): Status: ACTIVE | Noted: 2022-10-26

## 2022-10-26 PROBLEM — E66.9 OBESITY (BMI 30.0-34.9): Status: ACTIVE | Noted: 2022-10-26

## 2022-10-26 LAB
EXT PREGNANCY TEST URINE: NEGATIVE
EXT. CONTROL: NORMAL

## 2022-10-26 PROCEDURE — 82941 ASSAY OF GASTRIN: CPT

## 2022-10-26 PROCEDURE — 36415 COLL VENOUS BLD VENIPUNCTURE: CPT

## 2022-10-26 RX ORDER — SODIUM CHLORIDE 9 MG/ML
125 INJECTION, SOLUTION INTRAVENOUS CONTINUOUS
Status: DISCONTINUED | OUTPATIENT
Start: 2022-10-26 | End: 2022-10-30 | Stop reason: HOSPADM

## 2022-10-26 RX ORDER — LIDOCAINE HYDROCHLORIDE 20 MG/ML
INJECTION, SOLUTION EPIDURAL; INFILTRATION; INTRACAUDAL; PERINEURAL AS NEEDED
Status: DISCONTINUED | OUTPATIENT
Start: 2022-10-26 | End: 2022-10-26

## 2022-10-26 RX ORDER — PROPOFOL 10 MG/ML
INJECTION, EMULSION INTRAVENOUS AS NEEDED
Status: DISCONTINUED | OUTPATIENT
Start: 2022-10-26 | End: 2022-10-26

## 2022-10-26 RX ORDER — OMEPRAZOLE 40 MG/1
40 CAPSULE, DELAYED RELEASE ORAL
Qty: 60 CAPSULE | Refills: 1 | Status: SHIPPED | OUTPATIENT
Start: 2022-10-26 | End: 2022-12-25

## 2022-10-26 RX ADMIN — TOPICAL ANESTHETIC 1 SPRAY: 200 SPRAY DENTAL; PERIODONTAL at 07:58

## 2022-10-26 RX ADMIN — PROPOFOL 50 MG: 10 INJECTION, EMULSION INTRAVENOUS at 08:21

## 2022-10-26 RX ADMIN — PROPOFOL 50 MG: 10 INJECTION, EMULSION INTRAVENOUS at 08:13

## 2022-10-26 RX ADMIN — PROPOFOL 50 MG: 10 INJECTION, EMULSION INTRAVENOUS at 08:17

## 2022-10-26 RX ADMIN — PROPOFOL 50 MG: 10 INJECTION, EMULSION INTRAVENOUS at 08:14

## 2022-10-26 RX ADMIN — SODIUM CHLORIDE 125 ML/HR: 0.9 INJECTION, SOLUTION INTRAVENOUS at 07:54

## 2022-10-26 RX ADMIN — PROPOFOL 150 MG: 10 INJECTION, EMULSION INTRAVENOUS at 08:12

## 2022-10-26 RX ADMIN — LIDOCAINE HYDROCHLORIDE 100 MG: 20 INJECTION, SOLUTION EPIDURAL; INFILTRATION; INTRACAUDAL; PERINEURAL at 08:12

## 2022-10-26 NOTE — H&P
History and Physical - [facility]  Lincoln Zavala 23 y o  female MRN: [id number]                  HPI: Lincoln Zavala is a 23y o  year old female who presents for EGD to investigate nausea vomiting and abdominal pain  REVIEW OF SYSTEMS: Per the HPI, and otherwise unremarkable  Historical Information   Past Medical History:   Diagnosis Date   • Exercise-induced asthma    • Kidney stone    • Ovarian cyst      Past Surgical History:   Procedure Laterality Date   • APPENDECTOMY     • WISDOM TOOTH EXTRACTION     • WISDOM TOOTH EXTRACTION       Social History   Social History     Substance and Sexual Activity   Alcohol Use Yes    Comment: social      Social History     Substance and Sexual Activity   Drug Use Never     Social History     Tobacco Use   Smoking Status Never Smoker   Smokeless Tobacco Never Used     Family History   Problem Relation Age of Onset   • Heart disease Maternal Grandfather        Meds/Allergies     (Not in a hospital admission)      No Known Allergies    Objective     Blood pressure 117/61, pulse 78, temperature 98 2 °F (36 8 °C), temperature source Temporal, resp  rate 14, height 5' 10" (1 778 m), weight 105 kg (231 lb 7 7 oz), last menstrual period 10/23/2022, SpO2 97 %  PHYSICAL EXAM    Gen: NAD  CV: RRR  CHEST: Clear  ABD: soft, NT/ND  EXT: no edema      ASSESSMENT/PLAN:  Lincoln Zavala is a 23y o  year old female who presents for EGD to investigate nausea vomiting and abdominal pain  The patient is stable and optimized for the procedure, we reviewed risk and benefits  Risk include but not limited to infection, bleeding, perforation and missing a lesion

## 2022-10-26 NOTE — ANESTHESIA POSTPROCEDURE EVALUATION
Post-Op Assessment Note    CV Status:  Stable    Pain management: adequate     Mental Status:  Awake   Hydration Status:  Stable   PONV Controlled:  None   Airway Patency:  Patent      Post Op Vitals Reviewed: Yes      Staff: Anesthesiologist         No complications documented      /79 (10/26/22 0839)    Temp      Pulse 68 (10/26/22 0839)   Resp 18 (10/26/22 0839)    SpO2 98 % (10/26/22 0839)

## 2022-10-26 NOTE — ANESTHESIA PREPROCEDURE EVALUATION
Procedure:  EGD    Relevant Problems   GI/HEPATIC   (+) Gastroesophageal reflux disease      PULMONARY   (+) Moderate persistent asthma      Other   (+) Allergies   (+) Obesity (BMI 30 0-34  9)        Physical Exam    Airway    Mallampati score: III  TM Distance: >3 FB  Neck ROM: full     Dental   No notable dental hx     Cardiovascular  Cardiovascular exam normal    Pulmonary  Pulmonary exam normal     Other Findings        Anesthesia Plan  ASA Score- 2     Anesthesia Type- IV sedation with anesthesia with ASA Monitors  Additional Monitors:   Airway Plan:           Plan Factors-Exercise tolerance (METS): >4 METS  Chart reviewed  Patient is not a current smoker  Induction- intravenous  Postoperative Plan-     Informed Consent- Anesthetic plan and risks discussed with patient

## 2022-10-28 LAB — GASTRIN SERPL-MCNC: <10 PG/ML (ref 0–115)

## 2022-11-02 ENCOUNTER — TELEPHONE (OUTPATIENT)
Dept: OBGYN CLINIC | Facility: CLINIC | Age: 22
End: 2022-11-02

## 2022-11-02 DIAGNOSIS — Z30.011 ENCOUNTER FOR INITIAL PRESCRIPTION OF CONTRACEPTIVE PILLS: ICD-10-CM

## 2022-11-02 RX ORDER — NORETHINDRONE ACETATE AND ETHINYL ESTRADIOL 1; .02 MG/1; MG/1
1 TABLET ORAL DAILY
Qty: 84 TABLET | Refills: 1 | Status: SHIPPED | OUTPATIENT
Start: 2022-11-02

## 2022-11-02 NOTE — TELEPHONE ENCOUNTER
Pt is having irregular bleeding on the pill  Pt would like to change her prescription to a 3 month supply to [facility] on [address]

## 2022-11-11 DIAGNOSIS — R11.2 NAUSEA AND VOMITING, UNSPECIFIED VOMITING TYPE: Primary | ICD-10-CM

## 2022-11-11 DIAGNOSIS — R10.9 ABDOMINAL PAIN, UNSPECIFIED ABDOMINAL LOCATION: ICD-10-CM

## 2022-11-17 DIAGNOSIS — K59.09 OTHER CONSTIPATION: Primary | ICD-10-CM

## 2022-11-23 ENCOUNTER — OFFICE VISIT (OUTPATIENT)
Dept: GASTROENTEROLOGY | Facility: CLINIC | Age: 22
End: 2022-11-23

## 2022-11-23 VITALS
WEIGHT: 237 LBS | DIASTOLIC BLOOD PRESSURE: 62 MMHG | TEMPERATURE: 99.5 F | BODY MASS INDEX: 33.93 KG/M2 | SYSTOLIC BLOOD PRESSURE: 120 MMHG | HEIGHT: 70 IN

## 2022-11-23 DIAGNOSIS — K59.09 OTHER CONSTIPATION: Primary | ICD-10-CM

## 2022-11-23 NOTE — PROGRESS NOTES
[facility] - Outpatient Follow-up Note  Naeem Kan 23 y o  female MRN: [id number]  Encounter: [id number]          ASSESSMENT AND PLAN:      1  Constipation  2  Nausea and vomiting  3  Abdominal pain   4  Peptic ulcer disease    Patient on omeprazole twice daily  She will get gastric emptying study done soon  If patient continues to have symptoms and gastric emptying study is normal then we will repeat EGD to evaluate ulcers healing  Encouraged patient to continue MiraLax twice daily and to use Dulcolax as well  If bowel movements do not return to normal then would do EGD colonoscopy to investigate further  Patient agreeable with plan of care  RTC 4 months  Mal Urbano MD  Gastroenterology  Formerly [facility]  Date: November 23, 2022    ______________________________________________________________________    SUBJECTIVE:  29-year-old with duodenal ulcers presents for follow-up  EGD showed shallow duodenal ulcers in the duodenum  Biopsy from stomach negative for H pylori  Esophagitis mild and gastritis was seen as well  Patient on omeprazole 40 twice daily  Patient reached out to me last week with worsening constipation  Patient was prescribed MiraLax, Dulcolax as well as enema  Currently patient reports she has had 3 bowel movements in the last 5 days  She has been using MiraLax twice daily  She took 3 tablets of Dulcolax but has not used them further  She took 2 enemas on Friday since then has not required more  Abdominal pain and nausea have improved but not completely resolved  She is taking omeprazole twice daily  REVIEW OF SYSTEMS IS OTHERWISE NEGATIVE        Historical Information   Past Medical History:   Diagnosis Date   • Exercise-induced asthma    • Kidney stone    • Ovarian cyst      Past Surgical History:   Procedure Laterality Date   • APPENDECTOMY     • WISDOM TOOTH EXTRACTION     • WISDOM TOOTH EXTRACTION Social History   Social History     Substance and Sexual Activity   Alcohol Use Yes    Comment: social      Social History     Substance and Sexual Activity   Drug Use Never     Social History     Tobacco Use   Smoking Status Never   Smokeless Tobacco Never     Family History   Problem Relation Age of Onset   • Heart disease Maternal Grandfather        Meds/Allergies       Current Outpatient Medications:   •  albuterol (PROVENTIL HFA,VENTOLIN HFA) 90 mcg/act inhaler  •  ascorbic acid (VITAMIN C) 500 MG tablet  •  bisacodyl (DULCOLAX) 5 mg EC tablet  •  docusate sodium (ENEMEEZ) 283 MG enema  •  fluticasone (FLONASE) 50 mcg/act nasal spray  •  Multiple Vitamin (Multi-Day) TABS  •  norethindrone-ethinyl estradiol (Junel 1/20) 1-20 MG-MCG per tablet  •  omeprazole (PriLOSEC) 40 MG capsule  •  Probiotic Product (Misc Intestinal Vikki Regulat) CAPS  •  promethazine (PHENERGAN) 12 5 MG tablet  •  Symbicort 160-4 5 MCG/ACT inhaler  •  Zofran ODT 4 MG disintegrating tablet  •  sucralfate (CARAFATE) 1 g tablet    No Known Allergies        Objective     Blood pressure 120/62, temperature 99 5 °F (37 5 °C), temperature source Tympanic, height 5' 10" (1 778 m), weight 108 kg (237 lb)  Body mass index is 34 01 kg/m²  PHYSICAL EXAM:      General Appearance:   Alert, cooperative, no distress   HEENT:   Normocephalic, atraumatic, anicteric      Neck:  Supple, symmetrical, trachea midline   Lungs:   Clear to auscultation bilaterally; no rales, rhonchi or wheezing; respirations unlabored    Heart[de-identified]   Regular rate and rhythm; no murmur, rub, or gallop  Abdomen:   Soft, non-tender, non-distended; normal bowel sounds; no masses, no organomegaly    Genitalia:   Deferred    Rectal:   No masses felt  No fissures or hemorrhoids     Extremities:  No cyanosis, clubbing or edema    Pulses:  2+ and symmetric    Skin:  No jaundice, rashes, or lesions    Lymph nodes:  No palpable cervical lymphadenopathy        Lab Results:   No visits with results within 1 Day(s) from this visit  Latest known visit with results is:   Hospital Outpatient Visit on 10/26/2022   Component Date Value   • EXT Preg Test, Ur 10/26/2022 Negative    • Control 10/26/2022 Valid    • Case Report 10/26/2022                      Value:Surgical Pathology Report                         Case: [id number]                                   Authorizing Provider:  Raoul Harrell MD         Collected:           10/26/2022 0815              Ordering Location:     [facility]        Received:            10/26/2022 2141                                     [address] End[facility]                                                     Pathologist:           Marielle Oseguera MD                                                       Specimens:   A) - Duodenum, bx, R /O celiac                                                                      B) - Stomach, gastric bx, R/O H  pylori                                                             C) - Esophagus, random bx, R/O E O E                                                      • Final Diagnosis 10/26/2022                      Value: This result contains rich text formatting which cannot be displayed here  • Note 10/26/2022                      Value: This result contains rich text formatting which cannot be displayed here  • Additional Information 10/26/2022                      Value: This result contains rich text formatting which cannot be displayed here  • Gross Description 10/26/2022                      Value: This result contains rich text formatting which cannot be displayed here           Radiology Results:   EGD    Addendum Date: 10/26/2022 Addendum:   [address] [facility] [address] [place] [address] [phone] DATE OF SERVICE: 10/26/22 PHYSICIAN(S): Attending: Raoul Harrell MD Fellow: No Staff Documented INDICATION: Nausea and vomiting, unspecified vomiting type POST-OP DIAGNOSIS: See the impression below  PREPROCEDURE: Informed consent was obtained for the procedure, including sedation  Risks of perforation, hemorrhage, adverse drug reaction and aspiration were discussed  The patient was placed in the left lateral decubitus position  Patient was explained about the risks and benefits of the procedure  Risks including but not limited to bleeding, infection, and perforation were explained in detail  Also explained about less than 100% sensitivity with the exam and other alternatives  DETAILS OF PROCEDURE: Patient was taken to the procedure room where a time out was performed to confirm correct patient and correct procedure  The patient underwent monitored anesthesia care, which was administered by an anesthesia professional  The patient's blood pressure, heart rate, level of consciousness, respirations and oxygen were monitored throughout the procedure  The scope was advanced to the second part of the duodenum  Retroflexion was performed in the fundus  The patient experienced no blood loss  The procedure was not difficult  The patient tolerated the procedure well  There were no apparent complications   ANESTHESIA INFORMATION: ASA: II Anesthesia Type: IV Sedation with Anesthesia MEDICATIONS: No administrations occurring from 0811 to 0824 on 10/26/22 FINDINGS: Mild, localized erythematous mucosa in the GE junction (40 cm from the incisors) Mild, patchy edematous mucosa with erosion in the antrum Multiple small, superficial, linear, benign-appearing ulcers in the 1st part of the duodenum Performed forceps biopsies to rule out H  pylori in the body of the stomach, incisura and antrum Performed forceps biopsies to rule out celiac disease in the duodenal bulb, 1st part of the duodenum and 2nd part of the duodenum Performed forceps biopsies to rule out eosinophilic esophagitis in the esophagus SPECIMENS: ID Type Source Tests Collected by Time Destination 1 : bx, R /O celiac Tissue Duodenum TISSUE EXAM Bg Whitley MD 10/26/2022  8:15 AM  2 : gastric bx, R/O H  pylori Tissue Stomach TISSUE EXAM Bg Whitley MD 10/26/2022  8:19 AM  3 : random bx, R/O E O E  Tissue Esophagus TISSUE EXAM Bg Whitley MD 10/26/2022  8:22 AM  IMPRESSION: Mild esophagitis  Mild antral erosive gastritis  Small shallow ulcers in duodenal sweep  Random biopsy taken from esophagus, stomach and duodenum  RECOMMENDATION: Start patient on omeprazole 40 mg b i d  For 6-8 weeks  Await pathology results  Get fasting gastrin  Avoid NSAIDs  Bg Whitley MD     Result Date: 10/26/2022  Narrative: [address] End[facility] [address] [place] [address] [phone] DATE OF SERVICE: 10/26/22 PHYSICIAN(S): Attending: Bg Whitley MD Fellow: No Staff Documented INDICATION: Nausea and vomiting, unspecified vomiting type POST-OP DIAGNOSIS: See the impression below  PREPROCEDURE: Informed consent was obtained for the procedure, including sedation  Risks of perforation, hemorrhage, adverse drug reaction and aspiration were discussed  The patient was placed in the left lateral decubitus position  Patient was explained about the risks and benefits of the procedure  Risks including but not limited to bleeding, infection, and perforation were explained in detail  Also explained about less than 100% sensitivity with the exam and other alternatives  DETAILS OF PROCEDURE: Patient was taken to the procedure room where a time out was performed to confirm correct patient and correct procedure  The patient underwent monitored anesthesia care, which was administered by an anesthesia professional  The patient's blood pressure, heart rate, level of consciousness, respirations and oxygen were monitored throughout the procedure  The scope was advanced to the second part of the duodenum  Retroflexion was performed in the fundus  The patient experienced no blood loss  The procedure was not difficult   The patient tolerated the procedure well  There were no apparent complications  ANESTHESIA INFORMATION: ASA: II Anesthesia Type: IV Sedation with Anesthesia MEDICATIONS: No administrations occurring from 0811 to 0824 on 10/26/22 FINDINGS: Mild, localized erythematous mucosa in the GE junction (40 cm from the incisors) Mild, patchy edematous mucosa with erosion in the antrum Multiple small, superficial, linear, benign-appearing ulcers in the 1st part of the duodenum Performed forceps biopsies to rule out H  pylori in the body of the stomach, incisura and antrum Performed forceps biopsies to rule out celiac disease in the duodenal bulb, 1st part of the duodenum and 2nd part of the duodenum Performed forceps biopsies to rule out eosinophilic esophagitis in the esophagus SPECIMENS: ID Type Source Tests Collected by Time Destination 1 : bx, R /O celiac Tissue Duodenum TISSUE EXAM Maurice Ashby MD 10/26/2022  8:15 AM  2 : gastric bx, R/O H  pylori Tissue Stomach TISSUE EXAM Maurice Ashby MD 10/26/2022  8:19 AM  3 : random bx, R/O E O E  Tissue Esophagus TISSUE EXAM Maurice Ashby MD 10/26/2022  8:22 AM      Impression: Mild esophagitis  Mild antral erosive gastritis  Small shallow ulcers in duodenal sweep  Random biopsy taken from esophagus, stomach and duodenum  RECOMMENDATION: Start patient on omeprazole 40 mg b i d  For 6-8 weeks  Get fasting gastrin  Avoid NSAIDs     Maurice Ashby MD

## 2022-12-16 DIAGNOSIS — J45.40 MODERATE PERSISTENT ASTHMA WITHOUT COMPLICATION: ICD-10-CM

## 2022-12-20 RX ORDER — BUDESONIDE AND FORMOTEROL FUMARATE DIHYDRATE 160; 4.5 UG/1; UG/1
2 AEROSOL RESPIRATORY (INHALATION) 2 TIMES DAILY
Qty: 30.6 G | Refills: 5 | Status: SHIPPED | OUTPATIENT
Start: 2022-12-20

## 2022-12-27 ENCOUNTER — HOSPITAL ENCOUNTER (OUTPATIENT)
Dept: RADIOLOGY | Age: 22
Discharge: HOME | End: 2022-12-27

## 2022-12-27 DIAGNOSIS — R11.2 NAUSEA AND VOMITING, UNSPECIFIED VOMITING TYPE: ICD-10-CM

## 2022-12-27 DIAGNOSIS — R10.9 ABDOMINAL PAIN, UNSPECIFIED ABDOMINAL LOCATION: ICD-10-CM

## 2023-01-10 ENCOUNTER — CONSULT (OUTPATIENT)
Dept: NEPHROLOGY | Facility: CLINIC | Age: 23
End: 2023-01-10

## 2023-01-10 VITALS
RESPIRATION RATE: 16 BRPM | OXYGEN SATURATION: 97 % | HEART RATE: 84 BPM | SYSTOLIC BLOOD PRESSURE: 108 MMHG | DIASTOLIC BLOOD PRESSURE: 78 MMHG | WEIGHT: 232 LBS | HEIGHT: 70 IN | BODY MASS INDEX: 33.21 KG/M2

## 2023-01-10 DIAGNOSIS — N28.1 RENAL CYST: ICD-10-CM

## 2023-01-10 DIAGNOSIS — N20.0 NEPHROLITHIASIS: ICD-10-CM

## 2023-01-10 DIAGNOSIS — R80.9 PROTEINURIA, UNSPECIFIED TYPE: Primary | ICD-10-CM

## 2023-01-10 NOTE — PROGRESS NOTES
[facility]  Consultation - Nephrology    Farzana Cerda 23 y o  female MRN: [id number]      A/P:  1      1  Proteinuria, unspecified type  -     Comprehensive metabolic panel; Future; Expected date: 01/17/2023  -     PTH, intact; Future; Expected date: 01/17/2023  -     Phosphorus; Future; Expected date: 01/17/2023  -     Uric acid; Future; Expected date: 01/17/2023  -     CBC and Platelet; Future; Expected date: 01/17/2023  -     Microalbumin / creatinine urine ratio; Future; Expected date: 01/17/2023  -     Urinalysis with microscopic; Future; Expected date: 01/17/2023  -     CHINYERE Screen w/ Reflex to Titer/Pattern; Future; Expected date: 01/17/2023  -     Protein / creatinine ratio, urine; Future; Expected date: 01/17/2023    2  Nephrolithiasis  -     Ambulatory Referral to Nephrology  -     Phosphorus; Future; Expected date: 01/17/2023  -     Uric acid; Future; Expected date: 01/17/2023  -     Stone risk profile; Future; Expected date: 03/14/2023  -     Stone risk profile; Future; Expected date: 03/14/2023    3  Renal cyst         I have reviewed pertinent labs and imaging with patient  1  Nephrolithiasis, likely calcium oxalate  Stone disease typically presents in 20-30s  Environmental risk factor with dehydration over the summer  No stone analysis available, no previous 24 hr stone risk profile  Recommend  1  Reviewed common management for calcium oxalate stone:  Drink > 2 L to produce UOP 2 L per day  Reduce animal protein intake  Low sodium diet advised  Urine pH can be variable for this pt-- 5 0-7 0  Acidic urine pH can predispose to calcium oxalate stones  Calcium phos stones form in alkaline pH  2  Check two 24 hr stone risk profile to help identify risk factors  3  No changes to medications as of yet  4  Quantify proteinuria  5  Check chinyere   6  Check PTH/uric acid/UA/chem/phos prior to next visit    7  Follow up in 3 months during patient spring break  She is at [employer] and is limited in her availability for fu      2  Renal cysts BL  Kidneys normal sized, not commented to be polycystic from radiology report  Can consider genetic testing to evaluate for renal cystic disorder  Will continue to discuss at next visit  Pt considering options  No hx CKD in family  No hx cystic diseases in family  No hx gout, check uric acid  No hx anemia, check CBC  3  Proteinuria  Trace protein noted in October and September  Quantify microalbumin/cr ratio and urine protein/cr ratio for baseline  Check BRIGIDO  Depending on results of workup can decide if further testing is prudent  Cr baseline 0 6-0 7  Cr 0 65 10/23 with eGFR 128ml/min  Metabolic stable  The patient and any family members present were counseled today on risks benefits and alternatives of any medications, and were agreeable to the treatment plan  They were counseled on diagnostic testing if necessary, and projected outcomes as are available and medically indicated  All questions were asked and answered during the encounter  If more questions are to arise the patient will call the office  Alarm and return precautions discussed and accepted  Thank you for allowing us to participate in the care of your patient  History of Present Illness   Physician Requesting Consult: No att  providers found    HPI: Viktor Falcon is a 23y o  year old female who presents for stone evaluation at discretion of Urology  In October, she had pyelonephritis and was evaluated at [facility] with fevers of 104 and rigors  She was seen by urology after that to help manage infection and stones  She did not have a stone in October per pt  Denies procedures for stone management in the past  Last stone she had passed was over the summer  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  Bilateral punctate calculi from urology notes   Rama Degree was never analyzed  UA in [address] showed occasional calcium oxalate crystals    Drinking around 60 ounces of fluid per day  She had hx stomach ulcers  She is on birth control -June to help with ovarian cysts  No hx liver cysts  Denies hematuria  May have been told she had proteinuria but unclear details  Her father and mother had kidney stones  Her mother thinks she had calcium oxalate stones when she was younder  Denies LUTS  Denies further follow up with urology  Passed multiple stones over the past 2 years  1 5 week ago started on menstrual cycle  Most recent Cr 0 6  Denies frequent UTI as a child  No hx cystic disorders per parents  Ct abd/pelvis 9/29 showed multiple tiny nonobstructing stones BL kidneys  Several bilateral renal cortical and parapelvic cysts  Largest right cyst 2 5 cm  Kidney otherwise unremarkable  Constitutional ROS- Denies fatigue, fever, chills, night sweats, weight changes  HEENT ROS- Denies headaches or history of trauma, blurred vision     Endocrine ROS- No history diabetes mellitus or thyroid disease  Cardiovascular ROS- Denies chest pain, palpitation, dyspnea exertion, orthopnea, claudication  Pulmonary ROS-  Denies cough, hemoptysis, shortness of breath  GI ROS- Denies abdominal pain, diarrhea, nausea, swallowing problems, vomiting, constipation, blood in stools, fecal incontinence  Hematological ROS- Denies history of easy bruising, blood clots, bleeding or blood transfusions  Genitourinary ROS- Denies recent hematuria, pyuria, flank pain, change in urinary stream, decreased urinary output, increased urinary frequency, nocturia, foamy urine, or urinary incontinence  Lymphatic ROS- Denies lymphadenopathy  Musculoskeletal ROS- Denies history of muscle weakness, joint pain  Dermatological ROS- Denies rash, wounds, ulcers, itching, jaundice  Psychiatric ROS- Denies hallucinations, disorientation  Neurological ROS- No stroke or TIA symptoms  Historical Information   Past Medical History:   Diagnosis Date   • Exercise-induced asthma    • Kidney stone    • Ovarian cyst      Past Surgical History:   Procedure Laterality Date   • APPENDECTOMY     • WISDOM TOOTH EXTRACTION     • WISDOM TOOTH EXTRACTION       Social History   Social History     Substance and Sexual Activity   Alcohol Use Yes    Comment: social      Social History     Substance and Sexual Activity   Drug Use Never     Social History     Tobacco Use   Smoking Status Never   Smokeless Tobacco Never     Family History   Problem Relation Age of Onset   • Heart disease Maternal Grandfather        Meds/Allergies   all current active meds have been reviewed, current meds:     Current Outpatient Medications:   •  albuterol (PROVENTIL HFA,VENTOLIN HFA) 90 mcg/act inhaler, Inhale 2 puffs every 6 (six) hours as needed for wheezing, Disp: 18 g, Rfl: 5  •  ascorbic acid (VITAMIN C) 500 MG tablet, Take 500 mg by mouth, Disp: , Rfl:   •  fluticasone (FLONASE) 50 mcg/act nasal spray, 1 spray into each nostril daily, Disp: 15 8 mL, Rfl: 5  •  Multiple Vitamin (Multi-Day) TABS, Take 1 tablet by mouth, Disp: , Rfl:   •  norethindrone-ethinyl estradiol (Junel 1/20) 1-20 MG-MCG per tablet, Take 1 tablet by mouth daily, Disp: 84 tablet, Rfl: 1  •  Probiotic Product (Misc Intestinal Vikki Regulat) CAPS, Take by mouth, Disp: , Rfl:   •  promethazine (PHENERGAN) 12 5 MG tablet, Take 1 tablet (12 5 mg total) by mouth every 6 (six) hours as needed for nausea or vomiting, Disp: 30 tablet, Rfl: 0  •  Symbicort 160-4 5 MCG/ACT inhaler, Inhale 2 puffs 2 (two) times a day Rinse mouth after use , Disp: 30 6 g, Rfl: 5  •  Zofran ODT 4 MG disintegrating tablet, Take 1 tablet (4 mg total) by mouth every 6 (six) hours as needed for nausea or vomiting, Disp: 20 tablet, Rfl: 0  •  bisacodyl (DULCOLAX) 5 mg EC tablet, Take 2 tablets (10 mg total) by mouth in the morning for 14 doses Colonoscopy prep, Disp: 28 tablet, Rfl: 0  • docusate sodium (ENEMEEZ) 283 MG enema, Insert 1 enema into the rectum daily (Patient not taking: Reported on 1/10/2023), Disp: 30 each, Rfl: 0  •  omeprazole (PriLOSEC) 40 MG capsule, Take 1 capsule (40 mg total) by mouth 2 (two) times a day before meals, Disp: 60 capsule, Rfl: 1  •  sucralfate (CARAFATE) 1 g tablet, Take 1 tablet (1 g total) by mouth 4 (four) times a day for 12 doses, Disp: 12 tablet, Rfl: 0     No Known Allergies    Objective     Vitals:    01/10/23 0959   BP: 108/78   Pulse: 84   Resp: 16   SpO2: 97%       General Appearance:    No acute distress  Cooperative  Appears stated age  Head:    Normocephalic  Atraumatic  Normal jaw occlusion  Eyes:    Lids, conjunctiva normal  No scleral icterus  Ears:    Normal external ears  Nose:   Nares normal  No drainage  Mouth:   Lips, tongue normal  Mucosa normal  Phonation normal    Neck:   Supple  Symmetrical    Back:     Symmetric  No CVA tenderness  Lungs:     Normal respiratory effort  Clear to auscultation bilaterally  Chest wall:    No tenderness or deformity  Heart:    Regular rate and rhythm  Normal S1 and S2  No murmur  No JVD  No edema  Abdomen:     Soft  Non-tender  Bowel sounds active  Genitourinary:   No Vela catheter present  Extremities:   Extremities normal  Atraumatic  No cyanosis  Skin:   Warm and dry  No pallor, jaundice, rash, ecchymoses  Neurologic:   Alert and oriented to person, place, time  No focal deficit  Current Weight: Weight - Scale: 105 kg (232 lb)    First Weight:    Wt Readings from Last 3 Encounters:   01/10/23 105 kg (232 lb)   11/23/22 108 kg (237 lb)   10/26/22 105 kg (231 lb 7 7 oz)        Lab Results:  I have personally reviewed pertinent labs  10/23/22  Na 138 K 3 7 Cl 108 TCO2 23 BUN 9 Cr 0 65 Ca 9 0 eGFR 128 ml/min        Radiology review:      Ct abd/pelvis 9/29 showed multiple tiny nonobstructing stones BL kidneys  Several bilateral renal cortical and parapelvic cysts     Largest right cyst 2 5 cm  Kidney otherwise unremarkable  Oseas Henley,       This consultation note was produced in part using a dictation device which may document imprecise wording from author's original intent

## 2023-01-10 NOTE — PATIENT INSTRUCTIONS
Recommend checking two 24 hour stone risk profile to elucidate risk factor for stone  Try to drink atleast 2 liter of water per day  Decrease animal protein intake  Reduce sodium intake  Kidney Stone Prevention    Fluid Intake        The average daily urine output for normal healthy adults is 1 2 liters a day, ranging from 1 to 2 liters in most individuals and varying with body weight and gender  In stone formers, however, a higher daily urine output is required for stone prevention and achieving a daily volume of at least 2 0 to 2 5 liters a day can significantly reduce the recurrence of future stones  In a randomized study of stone formers who were given specific instructions to increase their fluid intake compared to stone formers told to not change their diet, those given specific fluid instructions achieved a high urine output of 2 6 liters a day versus 1 0 liters a day in those not given dietary instructions  Over a period of five years, the high fluid intake group was half as likely to form new stones as compared to the normal fluid intake group (Kelsy et al, J Urol 1996)  We recommend that most stone formers increase their daily fluid intake by one liter (an additional two 16 oz water bottles or two tall glasses a day) in order to achieve a urine output of 2 5 liters a day  (One liter = 4 2 8-oz glasses or 34 oz)  Alternatively, a 24 hour urine collection can be performed to guide fluid intake to achieve 2 5 liters of urine output in a specific patient  Type of Fluid Intake    The type of fluid intake is generally less important than the total intake  While drinking water is our preferred recommendation because it is inexpensive and contains no calories, for stone patients who do not enjoy drinking water, any beverage will be beneficial in reducing stone risk      Contrary to popular belief, studies have found that an increased intake of tea, coffee, and alcoholic beverage actually reduces the risk of stones, possibly because of an associated increase in urine output (Tiana, Am J of Epidemiology, 1996)  While tea contains high levels of oxalate, this does not appear to result in increased stone formation for the reasons discussed below in discussion on oxalate  Soda intake (including everette) and milk intake also do not appear to increase the risk of stones  Citrus Fruit Juices    Citrus juices, including lemon juice and orange juice, contain citrate, which acts as a stone inhibitor for calcium based stones  Citrate seems to do this by binding calcium, making it unavailable to combine with oxalate or phosphate: a necessary first step in the formation of stones  Citrate also seems to make it more difficult for stones to grow once they've formed  Drinking citrus juice in the form of concentrated lemon juice mixed with water has been shown to effectively increase urinary citrate levels and reduce urinary calcium levels, both of which will reduce stone risk  Orange juice will similarly increase urinary citrate levels  However, orange juice appears to also increase urinary oxalate levels ( a stone promoter)  Other sources of citrate, including grapefruit juice, have had less research completed confirming their beneficial effects on urinary citrate levels  Therefore, lemon juice is typically favored over the other citrus juices as a natural method to increase urinary citrate levels  Many patients find drinking citrus juices to be an attractive alternative to pharmaceutical treatment with potassium citrate  We recommend that stone formers consider supplementing their daily fluid intake with a mixture of 60 ml of concentrated lemon juice in one liter of water to increase their urinary citrate levels  Salt    A high sodium intake increases the risk of stone formation by increasing calcium levels and decreasing citrate (a stone inhibitor) levels in urine   Additionally, high sodium intake will impair the ability of medications such as hydrochlorothiazide to effectively reduce calcium levels in urine  A study of stone formers who were kept on a strict diet with a maximum daily sodium intake of 50 mmol (1200 mg) in addition to a reduced protein diet demonstrated that the low sodium diet was effective in reducing stone recurrence by 50% as compared to the low calcium diet  We recommend that stone formers aim to follow the FDA's guideline of limiting salt intake to 2300 mg of sodium a day in the general population and 1500 mg of sodium a day in those with hypertension,  Americans, or middle aged and older adults  2300 mg is equivalent to about 1 teaspoon of table salt  The best way to determine the salt content of your food is to read the nutrition label  Processed foods tend to contain higher amounts of salt  Choose low sodium options whenever possible  1 cup of canned chicken noodle soup contains 870 mg of sodium  A fried chicken drumstick contains 310 mg of sodium  A serving of shrimp contains 240 mg of sodium  2 slices of white bread contains 200 mg of sodium  15 potato chips contain 180 mg of sodium  1 container of strawberry yogurt contains 85 mg of sodium  1 tomato contains 20 mg of sodium  1 apple contains 0 mg of sodium  In addition to lowering the risk of stones, a low sodium intake helps to control or prevent high blood pressure, which can lead to heart disease, stroke, heart failure, and kidney disease  Animal Protein    Animal protein in meat products increases the risk of stone by increasing calcium, oxalate, and uric acid levels in urine  All three of these changes increase the risk of stones  In studies comparing high meat eaters versus low meat eaters, high meat eaters were found to be at increased risk of forming stones   A randomized study of stone formers restricted to a low meat intake of 52 grams a day (equivalent to 8 oz of beef) in combination with sodium restriction found that the combination reduced stone recurrence by 50% compared to calcium restriction alone (Kelsy et al, Claritza Fairbanks 2002)  We recommend that most stone formers try to reduce their meat intake to 6 oz a day  This includes all types of meat: beef, pork, poultry, and seafood  The USDA has recommended a daily allowance of 5-6 oz of protein intake among adults  They also recommend choosing non-meat protein foods such as nuts and beans instead of meat sources  Protein from non-meat sources does not appear to increase the risk of stones  A small steak contains about 3-4 oz of protein  A quarter pound hamburger with cheese contains 4 oz of protein  A chicken breast contains about 5 oz of protein, a chicken thigh about 2 5 oz, a chicken drumstick about 1 5 oz  One 5 oz can of tuna contains 5 oz of protein  1 medium egg contains 1 oz of protein  Lowering your animal protein intake and eating more fruits and vegetables also benefits your overall health by limiting the amount of saturated fats and cholesterol in your diet  This helps to reduce your risk of cardiovascular disease  Oxalate    While oxalate plays an important role in the development of calcium oxalate stones, dietary restriction does not appear to be effective in reducing the risk of stones in the majority of patients  About 40% of urinary oxalate comes from dietary sources while the remainder is naturally made within the liver  Therefore, reducing oxalate dietary intake does not always have a significant impact on total urinary oxalate levels  Oxalate is found in many vegetable and fruits, including many healthy dietary choices often making it difficult to achieve a low oxalate diet  Because of these issues, oxalate avoidance is beneficial primarily in those individuals with specific abnormalities that lead to high oxalate urinary levels      We recommend that most stone formers should maintain a normal oxalate intake without the need for oxalate restriction  High oxalate intake should be avoided in individuals found to have high urinary oxalate levels on metabolic evaluation  Oxalate restriction may be beneficial in certain individuals with high urinary oxalate levels  Oxalate Rich Foods    Tea (black)  Spinach  Mustard Greens  Chard  Beets  Rhubarb  Okra  Berries  Chocolate  Nuts  Sweet Potatoes        Calcium    Kidney Stone formers often question whether to stop or reduce their calcium intake  Despite the fact that calcium is a major component of 75% of stones, excessive calcium intake is vary rarely the cause of stone formation  In fact, several studies have shown that restricting calcium intake in most stone formers actually increases the number of stones they develop  This appears to happen because when less calcium is ingested, it becomes easier for oxalate (which normally binds with calcium in the gut) to be absorbed  Higher levels of oxalate in the urine then lead to an increase in stone risk  Calcium obtained from dietary sources appears to be better than calcium from supplements in regards to lowering stone risk because supplements can actually increase your risk of stones slightly (by 17%) while dietary calcium intake is instead associated with lower stone risk  If you need to take supplements, taking them during meals appear to be better in terms of stone risk  We recommend that stone formers maintain a normal calcium intake, preferably from dietary sources  Female stone formers taking calcium supplementation to prevent osteoporosis experience a slightly increased risk of stones (17%) which needs to be balanced against their risk of osteoporosis

## 2023-01-11 ENCOUNTER — TELEPHONE (OUTPATIENT)
Dept: NEPHROLOGY | Facility: CLINIC | Age: 23
End: 2023-01-11

## 2023-01-23 LAB
CREAT ?TM UR-SCNC: 96.6 UMOL/L
EXT MICROALBUMIN URINE RANDOM: 1.9
MICROALBUMIN/CREAT UR: 19.7 MG/G{CREAT}

## 2023-03-07 ENCOUNTER — LAB REQUISITION (OUTPATIENT)
Dept: LAB | Facility: HOSPITAL | Age: 23
End: 2023-03-07

## 2023-03-07 DIAGNOSIS — I02.9 RHEUMATIC CHOREA WITHOUT HEART INVOLVEMENT: ICD-10-CM

## 2023-03-09 LAB — BACTERIA THROAT CULT: NORMAL

## 2023-03-26 ENCOUNTER — HOSPITAL ENCOUNTER (OUTPATIENT)
Facility: HOSPITAL | Age: 23
Setting detail: OBSERVATION
Discharge: HOME | End: 2023-03-28
Attending: EMERGENCY MEDICINE | Admitting: INTERNAL MEDICINE

## 2023-03-26 ENCOUNTER — APPOINTMENT (EMERGENCY)
Dept: RADIOLOGY | Facility: HOSPITAL | Age: 23
End: 2023-03-26

## 2023-03-26 DIAGNOSIS — N12 PYELONEPHRITIS: Primary | ICD-10-CM

## 2023-03-26 DIAGNOSIS — R52 INTRACTABLE PAIN: ICD-10-CM

## 2023-03-26 LAB
ALBUMIN SERPL BCP-MCNC: 3.8 G/DL (ref 3.5–5)
ALP SERPL-CCNC: 97 U/L (ref 46–116)
ALT SERPL W P-5'-P-CCNC: 16 U/L (ref 12–78)
ANION GAP SERPL CALCULATED.3IONS-SCNC: 2 MMOL/L (ref 4–13)
AST SERPL W P-5'-P-CCNC: 13 U/L (ref 5–45)
BACTERIA UR QL AUTO: ABNORMAL /HPF
BASOPHILS # BLD AUTO: 0.06 THOUSANDS/ÂΜL (ref 0–0.1)
BASOPHILS NFR BLD AUTO: 1 % (ref 0–1)
BILIRUB SERPL-MCNC: 0.27 MG/DL (ref 0.2–1)
BILIRUB UR QL STRIP: NEGATIVE
BUN SERPL-MCNC: 8 MG/DL (ref 5–25)
CALCIUM SERPL-MCNC: 9.6 MG/DL (ref 8.3–10.1)
CHLORIDE SERPL-SCNC: 104 MMOL/L (ref 96–108)
CLARITY UR: CLEAR
CO2 SERPL-SCNC: 28 MMOL/L (ref 21–32)
COLOR UR: ABNORMAL
CREAT SERPL-MCNC: 0.62 MG/DL (ref 0.6–1.3)
EOSINOPHIL # BLD AUTO: 0.49 THOUSAND/ÂΜL (ref 0–0.61)
EOSINOPHIL NFR BLD AUTO: 4 % (ref 0–6)
ERYTHROCYTE [DISTWIDTH] IN BLOOD BY AUTOMATED COUNT: 12.7 % (ref 11.6–15.1)
EXT PREGNANCY TEST URINE: NEGATIVE
EXT. CONTROL: NORMAL
GFR SERPL CREATININE-BSD FRML MDRD: 130 ML/MIN/1.73SQ M
GLUCOSE SERPL-MCNC: 105 MG/DL (ref 65–140)
GLUCOSE UR STRIP-MCNC: NEGATIVE MG/DL
HCT VFR BLD AUTO: 37.7 % (ref 34.8–46.1)
HGB BLD-MCNC: 12.7 G/DL (ref 11.5–15.4)
HGB UR QL STRIP.AUTO: ABNORMAL
HYALINE CASTS #/AREA URNS LPF: ABNORMAL /LPF
IMM GRANULOCYTES # BLD AUTO: 0.07 THOUSAND/UL (ref 0–0.2)
IMM GRANULOCYTES NFR BLD AUTO: 1 % (ref 0–2)
KETONES UR STRIP-MCNC: NEGATIVE MG/DL
LEUKOCYTE ESTERASE UR QL STRIP: NEGATIVE
LIPASE SERPL-CCNC: 126 U/L (ref 73–393)
LYMPHOCYTES # BLD AUTO: 3.09 THOUSANDS/ÂΜL (ref 0.6–4.47)
LYMPHOCYTES NFR BLD AUTO: 24 % (ref 14–44)
MCH RBC QN AUTO: 29.3 PG (ref 26.8–34.3)
MCHC RBC AUTO-ENTMCNC: 33.7 G/DL (ref 31.4–37.4)
MCV RBC AUTO: 87 FL (ref 82–98)
MONOCYTES # BLD AUTO: 0.85 THOUSAND/ÂΜL (ref 0.17–1.22)
MONOCYTES NFR BLD AUTO: 7 % (ref 4–12)
NEUTROPHILS # BLD AUTO: 8.39 THOUSANDS/ÂΜL (ref 1.85–7.62)
NEUTS SEG NFR BLD AUTO: 63 % (ref 43–75)
NITRITE UR QL STRIP: NEGATIVE
NON-SQ EPI CELLS URNS QL MICRO: ABNORMAL /HPF
NRBC BLD AUTO-RTO: 0 /100 WBCS
PH UR STRIP.AUTO: 6 [PH]
PLATELET # BLD AUTO: 400 THOUSANDS/UL (ref 149–390)
PMV BLD AUTO: 10 FL (ref 8.9–12.7)
POTASSIUM SERPL-SCNC: 3.2 MMOL/L (ref 3.5–5.3)
PROT SERPL-MCNC: 7.8 G/DL (ref 6.4–8.4)
PROT UR STRIP-MCNC: NEGATIVE MG/DL
RBC # BLD AUTO: 4.33 MILLION/UL (ref 3.81–5.12)
RBC #/AREA URNS AUTO: ABNORMAL /HPF
SODIUM SERPL-SCNC: 134 MMOL/L (ref 135–147)
SP GR UR STRIP.AUTO: 1.01 (ref 1–1.03)
UROBILINOGEN UR STRIP-ACNC: <2 MG/DL
WBC # BLD AUTO: 12.95 THOUSAND/UL (ref 4.31–10.16)
WBC #/AREA URNS AUTO: ABNORMAL /HPF

## 2023-03-26 RX ORDER — KETOROLAC TROMETHAMINE 30 MG/ML
15 INJECTION, SOLUTION INTRAMUSCULAR; INTRAVENOUS ONCE
Status: COMPLETED | OUTPATIENT
Start: 2023-03-26 | End: 2023-03-26

## 2023-03-26 RX ORDER — ONDANSETRON 2 MG/ML
4 INJECTION INTRAMUSCULAR; INTRAVENOUS ONCE
Status: COMPLETED | OUTPATIENT
Start: 2023-03-26 | End: 2023-03-26

## 2023-03-26 RX ADMIN — KETOROLAC TROMETHAMINE 15 MG: 30 INJECTION, SOLUTION INTRAMUSCULAR; INTRAVENOUS at 23:24

## 2023-03-26 RX ADMIN — ONDANSETRON 4 MG: 2 INJECTION INTRAMUSCULAR; INTRAVENOUS at 23:24

## 2023-03-26 NOTE — Clinical Note
Paige Mcneal was seen and treated in our emergency department on 3/26/2023  Diagnosis:     Alexandra Goyal  may return to school on return date  She may return on this date: 03/28/2023         If you have any questions or concerns, please don't hesitate to call        Sharyl Alpers, DO    ______________________________           _______________          _______________  Hospital Representative                              Date                                Time

## 2023-03-27 ENCOUNTER — APPOINTMENT (EMERGENCY)
Dept: RADIOLOGY | Facility: HOSPITAL | Age: 23
End: 2023-03-27

## 2023-03-27 VITALS
HEIGHT: 70 IN | TEMPERATURE: 97.3 F | HEART RATE: 80 BPM | DIASTOLIC BLOOD PRESSURE: 68 MMHG | OXYGEN SATURATION: 94 % | SYSTOLIC BLOOD PRESSURE: 129 MMHG | WEIGHT: 245.59 LBS | BODY MASS INDEX: 35.16 KG/M2 | RESPIRATION RATE: 18 BRPM

## 2023-03-27 PROBLEM — N28.1 RENAL CYST: Status: ACTIVE | Noted: 2023-03-27

## 2023-03-27 PROBLEM — R65.10 SIRS (SYSTEMIC INFLAMMATORY RESPONSE SYNDROME) (HCC): Status: ACTIVE | Noted: 2023-03-27

## 2023-03-27 PROBLEM — R82.71 BACTERIURIA: Status: ACTIVE | Noted: 2023-03-27

## 2023-03-27 PROBLEM — R10.9 FLANK PAIN: Status: ACTIVE | Noted: 2023-03-27

## 2023-03-27 RX ORDER — ONDANSETRON 2 MG/ML
4 INJECTION INTRAMUSCULAR; INTRAVENOUS ONCE
Status: COMPLETED | OUTPATIENT
Start: 2023-03-27 | End: 2023-03-27

## 2023-03-27 RX ORDER — KETOROLAC TROMETHAMINE 10 MG/1
10 TABLET, FILM COATED ORAL ONCE
Status: COMPLETED | OUTPATIENT
Start: 2023-03-27 | End: 2023-03-27

## 2023-03-27 RX ORDER — ACETAMINOPHEN 325 MG/1
650 TABLET ORAL EVERY 6 HOURS PRN
Status: DISCONTINUED | OUTPATIENT
Start: 2023-03-27 | End: 2023-03-27

## 2023-03-27 RX ORDER — BUDESONIDE AND FORMOTEROL FUMARATE DIHYDRATE 160; 4.5 UG/1; UG/1
2 AEROSOL RESPIRATORY (INHALATION) 2 TIMES DAILY
Status: DISCONTINUED | OUTPATIENT
Start: 2023-03-27 | End: 2023-03-28 | Stop reason: HOSPADM

## 2023-03-27 RX ORDER — SACCHAROMYCES BOULARDII 250 MG
250 CAPSULE ORAL 2 TIMES DAILY
Status: DISCONTINUED | OUTPATIENT
Start: 2023-03-27 | End: 2023-03-28 | Stop reason: HOSPADM

## 2023-03-27 RX ORDER — ALBUTEROL SULFATE 90 UG/1
2 AEROSOL, METERED RESPIRATORY (INHALATION) EVERY 6 HOURS PRN
Status: DISCONTINUED | OUTPATIENT
Start: 2023-03-27 | End: 2023-03-28 | Stop reason: HOSPADM

## 2023-03-27 RX ORDER — HYDROMORPHONE HCL IN WATER/PF 6 MG/30 ML
0.2 PATIENT CONTROLLED ANALGESIA SYRINGE INTRAVENOUS ONCE
Status: COMPLETED | OUTPATIENT
Start: 2023-03-27 | End: 2023-03-27

## 2023-03-27 RX ORDER — OXYCODONE HYDROCHLORIDE 5 MG/1
2.5 TABLET ORAL EVERY 4 HOURS PRN
Status: DISCONTINUED | OUTPATIENT
Start: 2023-03-27 | End: 2023-03-28 | Stop reason: HOSPADM

## 2023-03-27 RX ORDER — HYDROMORPHONE HCL/PF 1 MG/ML
0.5 SYRINGE (ML) INJECTION ONCE
Status: COMPLETED | OUTPATIENT
Start: 2023-03-27 | End: 2023-03-27

## 2023-03-27 RX ORDER — SODIUM CHLORIDE, SODIUM GLUCONATE, SODIUM ACETATE, POTASSIUM CHLORIDE, MAGNESIUM CHLORIDE, SODIUM PHOSPHATE, DIBASIC, AND POTASSIUM PHOSPHATE .53; .5; .37; .037; .03; .012; .00082 G/100ML; G/100ML; G/100ML; G/100ML; G/100ML; G/100ML; G/100ML
500 INJECTION, SOLUTION INTRAVENOUS ONCE
Status: COMPLETED | OUTPATIENT
Start: 2023-03-27 | End: 2023-03-27

## 2023-03-27 RX ORDER — ONDANSETRON 2 MG/ML
4 INJECTION INTRAMUSCULAR; INTRAVENOUS EVERY 6 HOURS PRN
Status: DISCONTINUED | OUTPATIENT
Start: 2023-03-27 | End: 2023-03-28 | Stop reason: HOSPADM

## 2023-03-27 RX ORDER — SODIUM CHLORIDE, SODIUM GLUCONATE, SODIUM ACETATE, POTASSIUM CHLORIDE, MAGNESIUM CHLORIDE, SODIUM PHOSPHATE, DIBASIC, AND POTASSIUM PHOSPHATE .53; .5; .37; .037; .03; .012; .00082 G/100ML; G/100ML; G/100ML; G/100ML; G/100ML; G/100ML; G/100ML
100 INJECTION, SOLUTION INTRAVENOUS CONTINUOUS
Status: DISCONTINUED | OUTPATIENT
Start: 2023-03-27 | End: 2023-03-28 | Stop reason: HOSPADM

## 2023-03-27 RX ORDER — CEPHALEXIN 500 MG/1
500 CAPSULE ORAL EVERY 6 HOURS SCHEDULED
Qty: 40 CAPSULE | Refills: 0 | Status: SHIPPED | OUTPATIENT
Start: 2023-03-27 | End: 2023-04-06

## 2023-03-27 RX ORDER — KETOROLAC TROMETHAMINE 30 MG/ML
15 INJECTION, SOLUTION INTRAMUSCULAR; INTRAVENOUS ONCE
Status: DISCONTINUED | OUTPATIENT
Start: 2023-03-27 | End: 2023-03-27

## 2023-03-27 RX ORDER — KETOROLAC TROMETHAMINE 30 MG/ML
15 INJECTION, SOLUTION INTRAMUSCULAR; INTRAVENOUS ONCE
Status: COMPLETED | OUTPATIENT
Start: 2023-03-27 | End: 2023-03-27

## 2023-03-27 RX ORDER — POTASSIUM CHLORIDE 20 MEQ/1
20 TABLET, EXTENDED RELEASE ORAL ONCE
Status: CANCELLED | OUTPATIENT
Start: 2023-03-27 | End: 2023-03-27

## 2023-03-27 RX ORDER — KETOROLAC TROMETHAMINE 30 MG/ML
15 INJECTION, SOLUTION INTRAMUSCULAR; INTRAVENOUS EVERY 6 HOURS PRN
Status: DISCONTINUED | OUTPATIENT
Start: 2023-03-27 | End: 2023-03-28 | Stop reason: HOSPADM

## 2023-03-27 RX ORDER — FLUTICASONE PROPIONATE 50 MCG
1 SPRAY, SUSPENSION (ML) NASAL DAILY
Status: DISCONTINUED | OUTPATIENT
Start: 2023-03-27 | End: 2023-03-28 | Stop reason: HOSPADM

## 2023-03-27 RX ORDER — ACETAMINOPHEN 325 MG/1
650 TABLET ORAL EVERY 6 HOURS PRN
Status: DISCONTINUED | OUTPATIENT
Start: 2023-03-27 | End: 2023-03-28 | Stop reason: HOSPADM

## 2023-03-27 RX ORDER — ONDANSETRON 4 MG/1
4 TABLET, ORALLY DISINTEGRATING ORAL EVERY 6 HOURS PRN
Qty: 20 TABLET | Refills: 0 | Status: SHIPPED | OUTPATIENT
Start: 2023-03-27

## 2023-03-27 RX ORDER — KETOROLAC TROMETHAMINE 30 MG/ML
15 INJECTION, SOLUTION INTRAMUSCULAR; INTRAVENOUS EVERY 6 HOURS PRN
Status: DISCONTINUED | OUTPATIENT
Start: 2023-03-27 | End: 2023-03-27

## 2023-03-27 RX ORDER — OXYCODONE HYDROCHLORIDE 5 MG/1
5 TABLET ORAL EVERY 4 HOURS PRN
Status: DISCONTINUED | OUTPATIENT
Start: 2023-03-27 | End: 2023-03-28 | Stop reason: HOSPADM

## 2023-03-27 RX ORDER — POTASSIUM CHLORIDE 20 MEQ/1
20 TABLET, EXTENDED RELEASE ORAL ONCE
Status: COMPLETED | OUTPATIENT
Start: 2023-03-27 | End: 2023-03-27

## 2023-03-27 RX ORDER — PANTOPRAZOLE SODIUM 40 MG/1
40 TABLET, DELAYED RELEASE ORAL
Status: DISCONTINUED | OUTPATIENT
Start: 2023-03-27 | End: 2023-03-28 | Stop reason: HOSPADM

## 2023-03-27 RX ADMIN — ONDANSETRON 4 MG: 2 INJECTION INTRAMUSCULAR; INTRAVENOUS at 03:00

## 2023-03-27 RX ADMIN — SODIUM CHLORIDE, SODIUM GLUCONATE, SODIUM ACETATE, POTASSIUM CHLORIDE, MAGNESIUM CHLORIDE, SODIUM PHOSPHATE, DIBASIC, AND POTASSIUM PHOSPHATE 500 ML: .53; .5; .37; .037; .03; .012; .00082 INJECTION, SOLUTION INTRAVENOUS at 03:00

## 2023-03-27 RX ADMIN — ONDANSETRON 4 MG: 2 INJECTION INTRAMUSCULAR; INTRAVENOUS at 21:05

## 2023-03-27 RX ADMIN — HYDROMORPHONE HYDROCHLORIDE 0.5 MG: 1 INJECTION, SOLUTION INTRAMUSCULAR; INTRAVENOUS; SUBCUTANEOUS at 00:09

## 2023-03-27 RX ADMIN — B-COMPLEX W/ C & FOLIC ACID TAB 1 TABLET: TAB at 11:55

## 2023-03-27 RX ADMIN — OXYCODONE HYDROCHLORIDE 2.5 MG: 5 TABLET ORAL at 11:54

## 2023-03-27 RX ADMIN — KETOROLAC TROMETHAMINE 10 MG: 10 TABLET, FILM COATED ORAL at 21:05

## 2023-03-27 RX ADMIN — Medication 250 MG: at 17:07

## 2023-03-27 RX ADMIN — ONDANSETRON 4 MG: 2 INJECTION INTRAMUSCULAR; INTRAVENOUS at 07:28

## 2023-03-27 RX ADMIN — KETOROLAC TROMETHAMINE 15 MG: 30 INJECTION, SOLUTION INTRAMUSCULAR; INTRAVENOUS at 10:20

## 2023-03-27 RX ADMIN — FLUTICASONE PROPIONATE 1 SPRAY: 50 SPRAY, METERED NASAL at 11:56

## 2023-03-27 RX ADMIN — SODIUM CHLORIDE 1000 ML: 0.9 INJECTION, SOLUTION INTRAVENOUS at 00:11

## 2023-03-27 RX ADMIN — POTASSIUM CHLORIDE 20 MEQ: 1500 TABLET, EXTENDED RELEASE ORAL at 04:07

## 2023-03-27 RX ADMIN — SODIUM CHLORIDE, SODIUM GLUCONATE, SODIUM ACETATE, POTASSIUM CHLORIDE, MAGNESIUM CHLORIDE, SODIUM PHOSPHATE, DIBASIC, AND POTASSIUM PHOSPHATE 100 ML/HR: .53; .5; .37; .037; .03; .012; .00082 INJECTION, SOLUTION INTRAVENOUS at 10:22

## 2023-03-27 RX ADMIN — HYDROMORPHONE HYDROCHLORIDE 0.2 MG: 0.2 INJECTION, SOLUTION INTRAMUSCULAR; INTRAVENOUS; SUBCUTANEOUS at 01:39

## 2023-03-27 RX ADMIN — BUDESONIDE AND FORMOTEROL FUMARATE DIHYDRATE 2 PUFF: 160; 4.5 AEROSOL RESPIRATORY (INHALATION) at 21:06

## 2023-03-27 RX ADMIN — PANTOPRAZOLE SODIUM 40 MG: 40 TABLET, DELAYED RELEASE ORAL at 17:07

## 2023-03-27 RX ADMIN — KETOROLAC TROMETHAMINE 15 MG: 30 INJECTION, SOLUTION INTRAMUSCULAR; INTRAVENOUS at 07:29

## 2023-03-27 RX ADMIN — ACETAMINOPHEN 650 MG: 325 TABLET ORAL at 17:07

## 2023-03-27 RX ADMIN — CEFTRIAXONE SODIUM 1000 MG: 10 INJECTION, POWDER, FOR SOLUTION INTRAVENOUS at 01:31

## 2023-03-27 RX ADMIN — ONDANSETRON 4 MG: 2 INJECTION INTRAMUSCULAR; INTRAVENOUS at 11:51

## 2023-03-27 RX ADMIN — Medication 250 MG: at 11:55

## 2023-03-27 RX ADMIN — BUDESONIDE AND FORMOTEROL FUMARATE DIHYDRATE 2 PUFF: 160; 4.5 AEROSOL RESPIRATORY (INHALATION) at 11:55

## 2023-03-27 NOTE — ASSESSMENT & PLAN NOTE
Unclear etiology  Noted bacteruria but no significant pyuria  Will continue emipric txt for UTI until return of urine cx given presenting w/ SIRS  Hx of nephrolithiasis, follows with urology  S/p CT scan multiple non obstructive b/l renal calculi, no hydronephrosis or hydroureter  S/p pelvic US, nml  Prn pain analgesics with toradol   Trying to avoid narcotics  Cont supportive care  If symptoms persist consider CT scan w/ contrast

## 2023-03-27 NOTE — ASSESSMENT & PLAN NOTE
Met SIRs criteria with leukocytosis (though chronic), tachycardia  Source unclear  + bacteruria but no UR wbc   No evidence of pyelonephritis on ct w/o contrast  Given symptoms however will empirically txt with IV ctx  F/u urine cx  Check blood cx, procal  No clinical sign of pulm source of infection

## 2023-03-27 NOTE — ED ATTENDING ATTESTATION
3/26/2023  I, Madison Zapata MD, saw and evaluated the patient  I have discussed the patient with the resident/non-physician practitioner and agree with the resident's/non-physician practitioner's findings, Plan of Care, and MDM as documented in the resident's/non-physician practitioner's note, except where noted  All available labs and Radiology studies were reviewed  I was present for key portions of any procedure(s) performed by the resident/non-physician practitioner and I was immediately available to provide assistance  At this point I agree with the current assessment done in the Emergency Department  I have conducted an independent evaluation of this patient a history and physical is as follows:    OA: 24 y/o f with h/o kidney stones and frequent UTI who presents for one day of L flank/left sided lower abdominal pain  + nausea with vomiting  No fevers but occasional chills  Feels similar to previous kidney stones and infections  Denies any current urgency, hematuria, dysuria  No vaginal dc/bleeding  Recently stopped OCPs and states that she has had one normal period  History of appendectomy  No cp/sob  Follows with urology and nephrology  PE, uncomfortable but nontoxic, VSS with HR 90s, NC/AT, MMM, clear sclera/conjunctiva,  Neck supple/FROM, RR/-murmurs, lungs CTAB, -w/r, abd soft, + BS, - masses, + L CVA ttp, + LLQ mild ttp, - upper abdominal ttp, -rash, - edema, intact distal pulses, capillary refill < 2 sec, AAO  A/p flank pain, history of the same, eval with u/a, urine pregnancy, labs, imaging  Sxm control and treat accordingly  ED Course     Pt still with nausea and pain  Will give additional medication and re-eval  Have offered admission, pt prefers to try additional medication at this time  PT still with nausea and pain in LLQ, discussed concern for pyelonephritis as well as possible ovarian pathology   Will obtain u/s given ongoing pain    Critical Care Time  Procedures

## 2023-03-27 NOTE — DISCHARGE INSTRUCTIONS
You have been seen for a kidney infection  You should return to the ED if you develop persistent vomiting, worsening pain, or other worsening symptoms  Follow up with your primary care physician  Take antibiotic as directed  Use Motrin or Advil for pain  Use Zofran for nausea and vomiting

## 2023-03-27 NOTE — ED PROVIDER NOTES
History  Chief Complaint   Patient presents with   • Flank Pain     Hx of kidney stones  Reports R flank pain  Denies urinary symptoms  C/o nausea and vomiting, reports typical when has kidney stones      26-year-old female with a past medical history of asthma, GERD, and kidney stones presents with left flank pain  Patient states that the pain began at about 7 PM   Is been getting worse  It feels sharp  She states that the pain radiates into her right side  Patient feels nauseous and had 2 episodes of nonbloody vomiting  No diarrhea or constipation  No fevers  No urinary symptoms  She reports that the first day of her last menstrual period was about 2 weeks ago  She has no vaginal bleeding or discharge  Patient tried taking Tylenol for symptoms and states that it did not help much  No sick contacts  Patient states that this feels like prior episodes of kidney stones  She has not been able to eat or drink anything since this pain began  Prior to Admission Medications   Prescriptions Last Dose Informant Patient Reported? Taking? Multiple Vitamin (Multi-Day) TABS   Yes No   Sig: Take 1 tablet by mouth   Probiotic Product (Misc Intestinal Vikki Regulat) CAPS   Yes No   Sig: Take by mouth   Symbicort 160-4 5 MCG/ACT inhaler   No No   Sig: Inhale 2 puffs 2 (two) times a day Rinse mouth after use     Zofran ODT 4 MG disintegrating tablet   No No   Sig: Take 1 tablet (4 mg total) by mouth every 6 (six) hours as needed for nausea or vomiting   albuterol (PROVENTIL HFA,VENTOLIN HFA) 90 mcg/act inhaler   No No   Sig: Inhale 2 puffs every 6 (six) hours as needed for wheezing   ascorbic acid (VITAMIN C) 500 MG tablet   Yes No   Sig: Take 500 mg by mouth   bisacodyl (DULCOLAX) 5 mg EC tablet   No No   Sig: Take 2 tablets (10 mg total) by mouth in the morning for 14 doses Colonoscopy prep   docusate sodium (ENEMEEZ) 283 MG enema   No No   Sig: Insert 1 enema into the rectum daily   Patient not taking: Reported on 1/10/2023   fluticasone (FLONASE) 50 mcg/act nasal spray   No No   Si spray into each nostril daily   norethindrone-ethinyl estradiol (Junel ) 1-20 MG-MCG per tablet   No No   Sig: Take 1 tablet by mouth daily   omeprazole (PriLOSEC) 40 MG capsule   No No   Sig: Take 1 capsule (40 mg total) by mouth 2 (two) times a day before meals   promethazine (PHENERGAN) 12 5 MG tablet   No No   Sig: Take 1 tablet (12 5 mg total) by mouth every 6 (six) hours as needed for nausea or vomiting   sucralfate (CARAFATE) 1 g tablet   No No   Sig: Take 1 tablet (1 g total) by mouth 4 (four) times a day for 12 doses      Facility-Administered Medications: None       Past Medical History:   Diagnosis Date   • Exercise-induced asthma    • Kidney stone    • Ovarian cyst        Past Surgical History:   Procedure Laterality Date   • APPENDECTOMY     • WISDOM TOOTH EXTRACTION     • WISDOM TOOTH EXTRACTION         Family History   Problem Relation Age of Onset   • Heart disease Maternal Grandfather      I have reviewed and agree with the history as documented  E-Cigarette/Vaping   • E-Cigarette Use Never User      E-Cigarette/Vaping Substances   • Nicotine No    • THC No    • CBD No    • Flavoring No    • Other No    • Unknown No      Social History     Tobacco Use   • Smoking status: Never   • Smokeless tobacco: Never   Vaping Use   • Vaping Use: Never used   Substance Use Topics   • Alcohol use: Yes     Comment: social    • Drug use: Never        Review of Systems   Constitutional: Negative for chills, fatigue and fever  HENT: Negative for congestion, rhinorrhea and sore throat  Eyes: Negative for pain and redness  Respiratory: Negative for cough, chest tightness, shortness of breath and wheezing  Cardiovascular: Negative for chest pain and palpitations  Gastrointestinal: Positive for abdominal pain, nausea and vomiting  Negative for diarrhea  Endocrine: Negative      Genitourinary: Negative for difficulty urinating, dysuria, frequency, hematuria, vaginal bleeding and vaginal discharge  Musculoskeletal: Negative for back pain and myalgias  Skin: Negative for pallor and rash  Allergic/Immunologic: Negative  Neurological: Negative for dizziness, weakness, light-headedness and headaches  Hematological: Negative  Physical Exam  ED Triage Vitals   Temperature Pulse Respirations Blood Pressure SpO2   03/26/23 2244 03/26/23 2244 03/26/23 2244 03/26/23 2244 03/26/23 2309   98 °F (36 7 °C) (!) 112 20 143/79 99 %      Temp Source Heart Rate Source Patient Position - Orthostatic VS BP Location FiO2 (%)   03/26/23 2244 03/26/23 2244 03/26/23 2244 03/26/23 2244 --   Tympanic Monitor Sitting Left arm       Pain Score       03/26/23 2324       10 - Worst Possible Pain             Orthostatic Vital Signs  Vitals:    03/26/23 2244   BP: 143/79   Pulse: (!) 112   Patient Position - Orthostatic VS: Sitting       Physical Exam  Vitals and nursing note reviewed  Constitutional:       General: She is not in acute distress  Appearance: Normal appearance  She is not ill-appearing  HENT:      Head: Normocephalic and atraumatic  Eyes:      Conjunctiva/sclera: Conjunctivae normal    Cardiovascular:      Rate and Rhythm: Normal rate and regular rhythm  Pulmonary:      Effort: Pulmonary effort is normal  No respiratory distress  Abdominal:      General: Abdomen is flat  There is no distension  Palpations: Abdomen is soft  Tenderness: There is abdominal tenderness in the left upper quadrant and left lower quadrant  There is no right CVA tenderness, left CVA tenderness, guarding or rebound  Musculoskeletal:         General: Normal range of motion  Cervical back: Normal range of motion and neck supple  Skin:     General: Skin is warm and dry  Neurological:      General: No focal deficit present  Mental Status: She is alert and oriented to person, place, and time           ED Medications  Medications   cefTRIAXone (ROCEPHIN) 1,000 mg in dextrose 5 % 50 mL IVPB (has no administration in time range)   ondansetron (ZOFRAN) injection 4 mg (4 mg Intravenous Given 3/26/23 2324)   ketorolac (TORADOL) injection 15 mg (15 mg Intravenous Given 3/26/23 2324)   HYDROmorphone (DILAUDID) injection 0 5 mg (0 5 mg Intravenous Given 3/27/23 0009)   sodium chloride 0 9 % bolus 1,000 mL (1,000 mL Intravenous New Bag 3/27/23 0011)       Diagnostic Studies  Results Reviewed     Procedure Component Value Units Date/Time    Urine culture [[id number]]     Lab Status: No result Specimen: Urine, Clean Catch     Comprehensive metabolic panel [[id number]]  (Abnormal) Collected: 03/26/23 2325    Lab Status: Final result Specimen: Blood from Arm, Right Updated: 03/26/23 2356     Sodium 134 mmol/L      Potassium 3 2 mmol/L      Chloride 104 mmol/L      CO2 28 mmol/L      ANION GAP 2 mmol/L      BUN 8 mg/dL      Creatinine 0 62 mg/dL      Glucose 105 mg/dL      Calcium 9 6 mg/dL      AST 13 U/L      ALT 16 U/L      Alkaline Phosphatase 97 U/L      Total Protein 7 8 g/dL      Albumin 3 8 g/dL      Total Bilirubin 0 27 mg/dL      eGFR 130 ml/min/1 73sq m     Narrative:      [place] guidelines for Chronic Kidney Disease (CKD):   •  Stage 1 with normal or high GFR (GFR > 90 mL/min/1 73 square meters)  •  Stage 2 Mild CKD (GFR = 60-89 mL/min/1 73 square meters)  •  Stage 3A Moderate CKD (GFR = 45-59 mL/min/1 73 square meters)  •  Stage 3B Moderate CKD (GFR = 30-44 mL/min/1 73 square meters)  •  Stage 4 Severe CKD (GFR = 15-29 mL/min/1 73 square meters)  •  Stage 5 End Stage CKD (GFR <15 mL/min/1 73 square meters)  Note: GFR calculation is accurate only with a steady state creatinine    Lipase [[id number]]  (Normal) Collected: 03/26/23 2325    Lab Status: Final result Specimen: Blood from Arm, Right Updated: 03/26/23 2356     Lipase 126 u/L     CBC and differential [[id number]]  (Abnormal) Collected: 03/26/23 2325    Lab Status: Final result Specimen: Blood from Arm, Right Updated: 03/26/23 2335     WBC 12 95 Thousand/uL      RBC 4 33 Million/uL      Hemoglobin 12 7 g/dL      Hematocrit 37 7 %      MCV 87 fL      MCH 29 3 pg      MCHC 33 7 g/dL      RDW 12 7 %      MPV 10 0 fL      Platelets 225 Thousands/uL      nRBC 0 /100 WBCs      Neutrophils Relative 63 %      Immat GRANS % 1 %      Lymphocytes Relative 24 %      Monocytes Relative 7 %      Eosinophils Relative 4 %      Basophils Relative 1 %      Neutrophils Absolute 8 39 Thousands/µL      Immature Grans Absolute 0 07 Thousand/uL      Lymphocytes Absolute 3 09 Thousands/µL      Monocytes Absolute 0 85 Thousand/µL      Eosinophils Absolute 0 49 Thousand/µL      Basophils Absolute 0 06 Thousands/µL     Urine Microscopic [[id number]]  (Abnormal) Collected: 03/26/23 2309    Lab Status: Final result Specimen: Urine, Clean Catch Updated: 03/26/23 2327     RBC, UA 2-4 /hpf      WBC, UA 1-2 /hpf      Epithelial Cells Occasional /hpf      Bacteria, UA Innumerable /hpf      Hyaline Casts, UA 0-3 /lpf     UA w Reflex to Microscopic w Reflex to Culture [[id number]]  (Abnormal) Collected: 03/26/23 2309    Lab Status: Final result Specimen: Urine, Clean Catch Updated: 03/26/23 2322     Color, UA Light Yellow     Clarity, UA Clear     Specific Gravity, UA 1 011     pH, UA 6 0     Leukocytes, UA Negative     Nitrite, UA Negative     Protein, UA Negative mg/dl      Glucose, UA Negative mg/dl      Ketones, UA Negative mg/dl      Urobilinogen, UA <2 0 mg/dl      Bilirubin, UA Negative     Occult Blood, UA Moderate    POCT pregnancy, urine [[id number]]  (Normal) Resulted: 03/26/23 2312    Lab Status: Final result Updated: 03/26/23 2312     EXT Preg Test, Ur Negative     Control Valid                 CT renal stone study abdomen pelvis wo contrast   Final Result by Herrera Delacruz MD (03/27 0024)      No acute intra-abdominal abnormality        Bilateral subcentimeter nonobstructing intrarenal calculi  No hydronephrosis or hydroureter  Workstation performed: [id number]               Procedures  Procedures      ED Course                                       Medical Decision Making  82-year-old female with a history of kidney stones presents with left flank pain  Concern for nephrolithiasis, gastroenteritis, pyelonephritis, pancreatitis, or other acute abdominal process  Will evaluate with CBC, CMP, lipase, pregnancy, UA, and CT abdomen pelvis renal stone study  Will give Toradol, Zofran, and fluids and reevaluate  Patient felt better after medications  Based on clinical symptoms and the bacteria in her urine, we decided to treat this as pyelonephritis  Patient was given a dose of IV Rocephin and started on Keflex  Recommend NSAIDs for pain  She was given a prescription for Zofran for nausea and vomiting  Discussed all results and plans with patient  Recommend follow up with primary care physician  Return precautions given  All questions answered  Pyelonephritis: acute illness or injury  Amount and/or Complexity of Data Reviewed  External Data Reviewed:      Details: Reviewed past medical history and medications  Labs: ordered  Radiology: ordered  Risk  OTC drugs  Prescription drug management  Disposition  Final diagnoses:   Pyelonephritis     Time reflects when diagnosis was documented in both MDM as applicable and the Disposition within this note     Time User Action Codes Description Comment    3/27/2023 12:40 AM Arcadio Sánchez Add [N12] Pyelonephritis       ED Disposition     ED Disposition   Discharge    Condition   Good    Date/Time   Mon Mar 27, 2023 12:40 AM    Freddy Yan discharge to home/self care                 Follow-up Information     Follow up With Specialties Details Why Ivania Rowland MD Pediatrics   [address] [facility]   [address] [facility] [address]  [address]  [phone]            Patient's Medications   Discharge Prescriptions    CEPHALEXIN (KEFLEX) 500 MG CAPSULE    Take 1 capsule (500 mg total) by mouth every 6 (six) hours for 10 days       Start Date: 3/27/2023 End Date: 4/6/2023       Order Dose: 500 mg       Quantity: 40 capsule    Refills: 0    ONDANSETRON (ZOFRAN ODT) 4 MG DISINTEGRATING TABLET    Take 1 tablet (4 mg total) by mouth every 6 (six) hours as needed for nausea or vomiting       Start Date: 3/27/2023 End Date: --       Order Dose: 4 mg       Quantity: 20 tablet    Refills: 0     No discharge procedures on file  PDMP Review     None           ED Provider  Attending physically available and evaluated Mary Rodriguez I managed the patient along with the ED Attending      Electronically Signed by         Natasha Mason DO  03/27/23 6863

## 2023-03-27 NOTE — H&P
[facility]  H&P  Name: Cydney Love  MRN: [id number]  Unit/Bed#: [facility] 259-15 I Date of Admission: 3/26/2023   Date of Service: 3/27/2023 I Hospital Day: 0      Assessment/Plan   * Flank pain  Assessment & Plan  Unclear etiology  Noted bacteruria but no significant pyuria  Will continue emipric txt for UTI until return of urine cx given presenting w/ SIRS  Hx of nephrolithiasis, follows with urology  S/p CT scan multiple non obstructive b/l renal calculi, no hydronephrosis or hydroureter  S/p pelvic US, nml  Prn pain analgesics with toradol  Trying to avoid narcotics  Cont supportive care  If symptoms persist consider CT scan w/ contrast    SIRS (systemic inflammatory response syndrome) (HCC)  Assessment & Plan  Met SIRs criteria with leukocytosis (though chronic), tachycardia  Source unclear  + bacteruria but no UR wbc  No evidence of pyelonephritis on ct w/o contrast  Given symptoms however will empirically txt with IV ctx  F/u urine cx  Check blood cx, procal  No clinical sign of pulm source of infection    Bacteriuria  Assessment & Plan  Cont on IV CTX  F/u ur cx, procal    Renal cyst  Assessment & Plan  Noted hx  Follows with nephrology as outpt  Is considering genetic testing in the future    Obesity (BMI 30 0-34  9)  Assessment & Plan  Lifestyle/dietary modification    Gastroesophageal reflux disease  Assessment & Plan  Cont PPI    Moderate persistent asthma  Assessment & Plan  No sign of exacerbation  Prn albuterol       VTE Prophylaxis: Low risk, ambulates  / sequential compression device   Code Status: Full code  POLST: There is no POLST form on file for this patient (pre-hospital)  Discussion with family: D/w pt at length  Also called her mother Teri Burgess and updated her    Anticipated Length of Stay:  Patient will be admitted on an Observation basis with an anticipated length of stay of  < 2 midnights     Justification for Hospital Stay: Flank pain, SIRs    Total Time for Visit, including Counseling / Coordination of Care: 60 minutes  Greater than 50% of this total time spent on direct patient counseling and coordination of care  Chief Complaint:   L flank pain x 1 days    History of Present Illness:    Kosta Blair is a 23 y o  female medical hx of for lithiasis, asthma, ovarian cyst, GERD presents to the ER with complaint of left flank x1 day  She reports initially occurring left lower quadrant abd pain which eventually radiated to her back  She reports of nausea and nonbloody, nonbilious vomiting  She denies fever or chills  She denies dysuria, urinary frequency, urinary urgency  She denies diarrhea  Last menstrual period was 2 to 3 weeks ago  On presentation to the ER, pelvic ultrasound done, CT scan also done  Patient required multiple doses of IV Dilaudid; per ER with no relief  She was given a dose of IV ceftriaxone  Upon my evaluation of patient, sitting up in bed; looking at her phone  Not in painful distress  Non toxic appearing       Review of Systems:    Review of Systems   Gastrointestinal: Positive for abdominal pain, nausea and vomiting  Genitourinary: Positive for flank pain  All other systems reviewed and are negative  Past Medical and Surgical History:     Past Medical History:   Diagnosis Date   • Exercise-induced asthma    • Kidney stone    • Ovarian cyst        Past Surgical History:   Procedure Laterality Date   • APPENDECTOMY     • WISDOM TOOTH EXTRACTION     • WISDOM TOOTH EXTRACTION         Meds/Allergies:    Prior to Admission medications    Medication Sig Start Date End Date Taking?  Authorizing Provider   cephalexin (KEFLEX) 500 mg capsule Take 1 capsule (500 mg total) by mouth every 6 (six) hours for 10 days 3/27/23 4/6/23 Yes Marlyn Rahman DO   ondansetron (Zofran ODT) 4 mg disintegrating tablet Take 1 tablet (4 mg total) by mouth every 6 (six) hours as needed for nausea or vomiting 3/27/23  Yes Padmini Roberts,  albuterol (PROVENTIL HFA,VENTOLIN HFA) 90 mcg/act inhaler Inhale 2 puffs every 6 (six) hours as needed for wheezing 9/1/22   Chano Conner MD   ascorbic acid (VITAMIN C) 500 MG tablet Take 500 mg by mouth    Historical Provider, MD   bisacodyl (DULCOLAX) 5 mg EC tablet Take 2 tablets (10 mg total) by mouth in the morning for 14 doses Colonoscopy prep 11/17/22 12/1/22  Kalani Vilchis MD   fluticasone Catha Jaksch) 50 mcg/act nasal spray 1 spray into each nostril daily 9/1/22   Chano Conner MD   Multiple Vitamin (Multi-Day) TABS Take 1 tablet by mouth    Historical Provider, MD   norethindrone-ethinyl estradiol (Junel 1/20) 1-20 MG-MCG per tablet Take 1 tablet by mouth daily 11/2/22   Kathy Baptiste MD   omeprazole (PriLOSEC) 40 MG capsule Take 1 capsule (40 mg total) by mouth 2 (two) times a day before meals 10/26/22 12/25/22  Kalani Vilchis MD   Probiotic Product (Misc Intestinal Vikki Regulat) CAPS Take by mouth    Historical Provider, MD   promethazine (PHENERGAN) 12 5 MG tablet Take 1 tablet (12 5 mg total) by mouth every 6 (six) hours as needed for nausea or vomiting 10/25/22   Kalani Vilchis MD   sucralfate (CARAFATE) 1 g tablet Take 1 tablet (1 g total) by mouth 4 (four) times a day for 12 doses 10/23/22 10/26/22  Darryl Tolentino MD   Symbicort 160-4 5 MCG/ACT inhaler Inhale 2 puffs 2 (two) times a day Rinse mouth after use  12/20/22   Chano Conner MD   Zofran ODT 4 MG disintegrating tablet Take 1 tablet (4 mg total) by mouth every 6 (six) hours as needed for nausea or vomiting 10/23/22   Darryl Tolentino MD   docusate sodium (ENEMEEZ) 283 MG enema Insert 1 enema into the rectum daily  Patient not taking: Reported on 1/10/2023 11/17/22 3/27/23  Kalani Vilchis MD     I have reviewed home medications using allscripts      Allergies: No Known Allergies    Social History:     Marital Status: Single   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  Patient Pre-hospital Living Situation: Resides in the dorm  Patient Pre-hospital Level of Mobility: Independent  Patient Pre-hospital Diet Restrictions: none  Substance Use History:   Social History     Substance and Sexual Activity   Alcohol Use Yes    Comment: social      Social History     Tobacco Use   Smoking Status Never   Smokeless Tobacco Never     Social History     Substance and Sexual Activity   Drug Use Never       Family History:    Family History   Problem Relation Age of Onset   • Heart disease Maternal Grandfather        Physical Exam:     Vitals:   Blood Pressure: 130/77 (03/27/23 0941)  Pulse: 66 (03/27/23 0900)  Temperature: 98 °F (36 7 °C) (03/26/23 2244)  Temp Source: Tympanic (03/26/23 2244)  Respirations: 16 (03/27/23 0941)  Height: 5' 10\" (177 8 cm) (03/27/23 0941)  Weight - Scale: 111 kg (245 lb 9 5 oz) (03/27/23 0941)  SpO2: 97 % (03/27/23 0900)    Physical Exam  Constitutional:       Appearance: She is obese  Cardiovascular:      Rate and Rhythm: Normal rate and regular rhythm  Pulses: Normal pulses  Heart sounds: Normal heart sounds  No murmur heard  Pulmonary:      Effort: Pulmonary effort is normal  No respiratory distress  Breath sounds: Normal breath sounds  No wheezing or rales  Abdominal:      General: Abdomen is flat  Bowel sounds are normal  There is no distension  Palpations: Abdomen is soft  Tenderness: There is no abdominal tenderness  There is left CVA tenderness  There is no guarding  Musculoskeletal:         General: Normal range of motion  Cervical back: Normal range of motion and neck supple  Right lower leg: No edema  Left lower leg: No edema  Skin:     General: Skin is warm and dry  Neurological:      General: No focal deficit present  Mental Status: She is alert and oriented to person, place, and time  Mental status is at baseline  Cranial Nerves: No cranial nerve deficit  Motor: No weakness   " Additional Data:     Lab Results: I have personally reviewed pertinent reports  Results from last 7 days   Lab Units 03/26/23  2325   WBC Thousand/uL 12 95*   HEMOGLOBIN g/dL 12 7   HEMATOCRIT % 37 7   PLATELETS Thousands/uL 400*   NEUTROS PCT % 63   LYMPHS PCT % 24   MONOS PCT % 7   EOS PCT % 4     Results from last 7 days   Lab Units 03/26/23  2325   SODIUM mmol/L 134*   POTASSIUM mmol/L 3 2*   CHLORIDE mmol/L 104   CO2 mmol/L 28   BUN mg/dL 8   CREATININE mg/dL 0 62   ANION GAP mmol/L 2*   CALCIUM mg/dL 9 6   ALBUMIN g/dL 3 8   TOTAL BILIRUBIN mg/dL 0 27   ALK PHOS U/L 97   ALT U/L 16   AST U/L 13   GLUCOSE RANDOM mg/dL 105                       Imaging: I have personally reviewed pertinent reports  US pelvis complete w transvaginal   Final Result by Christine Hastings MD (03/27 0831)       Normal                                Workstation performed: [id number]         CT renal stone study abdomen pelvis wo contrast   Final Result by Tahmina Lora MD (03/27 0024)      No acute intra-abdominal abnormality  Bilateral subcentimeter nonobstructing intrarenal calculi  No hydronephrosis or hydroureter  Workstation performed: [id number]             EKG, Pathology, and Other Studies Reviewed on Admission:   · EKG:     Allscripts / Epic Records Reviewed: No     ** Please Note: This note has been constructed using a voice recognition system   **

## 2023-03-27 NOTE — ASSESSMENT & PLAN NOTE
Unclear etiology  Noted bacteruria but no significant pyuria  Will continue emipric txt for UTI until return of urine cx given presenting w/ SIRS  Hx of nephrolithiasis, follows with urology     S/p CT scan multiple non obstructive b/l renal calculi, no hydronephrosis or hydroureter  Symptoms resolved  Urine culture negative  Medically clear for discharge

## 2023-03-27 NOTE — ED CARE HANDOFF
Emergency Department Sign Out Note        Sign out and transfer of care from Dr Rahman  See Separate Emergency Department note  The patient, David Del Angel, was evaluated by the previous provider for left flank pain  Workup Completed:  U/a  CMP  CBC  Preg  CT renal stone study    ED Course / Workup Pending (followup):      Ceftriaxone as well as fluids             Patient re-evaluated multiple times with decreased pain  Offered patient admission with continued pain and patient with continued nausea  Patient denies and would like to try another 500 L fluid as well as some antiemetic  Will evaluate  After multiple reevaluations patient with continued pain and nausea  Patient really does not want admission to the hospital   Will check ultrasound of pelvis given her cyst history  If patient with continued pain will admit  Patient signed out prior to disposition  ED Course as of 03/27/23 0725   Mon Mar 27, 2023   7259 So: treated for pylo; abx after    Rocha Usha will be here by 6:45 for patient     Procedures  MDM        Disposition  Final diagnoses:   Pyelonephritis     Time reflects when diagnosis was documented in both MDM as applicable and the Disposition within this note     Time User Action Codes Description Comment    3/27/2023 12:40 AM Mario Han Add [N12] Pyelonephritis       ED Disposition     ED Disposition   Discharge    Condition   Good    Date/Time   Mon Mar 27, 2023 12:40 AM    Comment   David Del Angel discharge to home/self care                 Follow-up Information     Follow up With Specialties Details Why Ivania Rowland MD Pediatrics   [address] [facility]   [address]  [facility] [address]  [phone]          Patient's Medications   Discharge Prescriptions    CEPHALEXIN (KEFLEX) 500 MG CAPSULE    Take 1 capsule (500 mg total) by mouth every 6 (six) hours for 10 days       Start Date: 3/27/2023 End Date: 4/6/2023       Order Dose: 500 mg       Quantity: 40 capsule    Refills: 0    ONDANSETRON (ZOFRAN ODT) 4 MG DISINTEGRATING TABLET    Take 1 tablet (4 mg total) by mouth every 6 (six) hours as needed for nausea or vomiting       Start Date: 3/27/2023 End Date: --       Order Dose: 4 mg       Quantity: 20 tablet    Refills: 0     No discharge procedures on file         ED Provider  Electronically Signed by     Rose Edwards DO  03/27/23 0725

## 2023-03-28 LAB
ANION GAP SERPL CALCULATED.3IONS-SCNC: 3 MMOL/L (ref 4–13)
BACTERIA UR CULT: NORMAL
BUN SERPL-MCNC: 5 MG/DL (ref 5–25)
CALCIUM SERPL-MCNC: 8.4 MG/DL (ref 8.3–10.1)
CHLORIDE SERPL-SCNC: 111 MMOL/L (ref 96–108)
CO2 SERPL-SCNC: 24 MMOL/L (ref 21–32)
CREAT SERPL-MCNC: 0.49 MG/DL (ref 0.6–1.3)
ERYTHROCYTE [DISTWIDTH] IN BLOOD BY AUTOMATED COUNT: 12.9 % (ref 11.6–15.1)
GFR SERPL CREATININE-BSD FRML MDRD: 140 ML/MIN/1.73SQ M
GLUCOSE P FAST SERPL-MCNC: 105 MG/DL (ref 65–99)
GLUCOSE SERPL-MCNC: 105 MG/DL (ref 65–140)
HCT VFR BLD AUTO: 34.5 % (ref 34.8–46.1)
HGB BLD-MCNC: 11.5 G/DL (ref 11.5–15.4)
MCH RBC QN AUTO: 29.5 PG (ref 26.8–34.3)
MCHC RBC AUTO-ENTMCNC: 33.3 G/DL (ref 31.4–37.4)
MCV RBC AUTO: 89 FL (ref 82–98)
PLATELET # BLD AUTO: 303 THOUSANDS/UL (ref 149–390)
PMV BLD AUTO: 10 FL (ref 8.9–12.7)
POTASSIUM SERPL-SCNC: 3.9 MMOL/L (ref 3.5–5.3)
PROCALCITONIN SERPL-MCNC: <0.05 NG/ML
RBC # BLD AUTO: 3.9 MILLION/UL (ref 3.81–5.12)
SODIUM SERPL-SCNC: 138 MMOL/L (ref 135–147)
WBC # BLD AUTO: 7.69 THOUSAND/UL (ref 4.31–10.16)

## 2023-03-28 RX ADMIN — PANTOPRAZOLE SODIUM 40 MG: 40 TABLET, DELAYED RELEASE ORAL at 06:47

## 2023-03-28 RX ADMIN — FLUTICASONE PROPIONATE 1 SPRAY: 50 SPRAY, METERED NASAL at 09:36

## 2023-03-28 RX ADMIN — CEFTRIAXONE SODIUM 1000 MG: 10 INJECTION, POWDER, FOR SOLUTION INTRAVENOUS at 00:34

## 2023-03-28 RX ADMIN — SODIUM CHLORIDE, SODIUM GLUCONATE, SODIUM ACETATE, POTASSIUM CHLORIDE, MAGNESIUM CHLORIDE, SODIUM PHOSPHATE, DIBASIC, AND POTASSIUM PHOSPHATE 100 ML/HR: .53; .5; .37; .037; .03; .012; .00082 INJECTION, SOLUTION INTRAVENOUS at 06:48

## 2023-03-28 RX ADMIN — B-COMPLEX W/ C & FOLIC ACID TAB 1 TABLET: TAB at 09:36

## 2023-03-28 RX ADMIN — Medication 250 MG: at 09:36

## 2023-03-28 RX ADMIN — BUDESONIDE AND FORMOTEROL FUMARATE DIHYDRATE 2 PUFF: 160; 4.5 AEROSOL RESPIRATORY (INHALATION) at 09:36

## 2023-03-28 NOTE — DISCHARGE SUMMARY
[address] [facility]  Discharge- Kosta Pillion 2000, 23 y o  female MRN: [id number]  Unit/Bed#: [facility] 799-27 Encounter: [id number]  Primary Care Provider: YESSENIA Black   Date and time admitted to hospital: 3/26/2023 10:50 PM    * Flank pain  Assessment & Plan  Unclear etiology  Noted bacteruria but no significant pyuria  Will continue emipric txt for UTI until return of urine cx given presenting w/ SIRS  Hx of nephrolithiasis, follows with urology  S/p CT scan multiple non obstructive b/l renal calculi, no hydronephrosis or hydroureter  Symptoms resolved  Urine culture negative  Medically clear for discharge    SIRS (systemic inflammatory response syndrome) (HCC)  Assessment & Plan  Met SIRs criteria with leukocytosis (though chronic), tachycardia  Resolved  Not secondary to infection    Obesity (BMI 30 0-34  9)  Assessment & Plan  Lifestyle modifications    Gastroesophageal reflux disease  Assessment & Plan  Continue PPI    Moderate persistent asthma  Assessment & Plan  Not in acute exacerbation    Discharging Physician / Practitioner: Emy Muñoz MD  PCP: YESSENIA Black  Admission Date:   Admission Orders (From admission, onward)     Ordered        03/27/23 0918  Place in Observation  Once                      Discharge Date: 03/28/23    Medical Problems     Resolved Problems  Date Reviewed: 3/28/2023   None         Consultations During Hospital Stay:  · none    Procedures Performed:   · none    Significant Findings / Test Results:   CT renal stone study abdomen pelvis wo contrast    Result Date: 3/27/2023  Impression: No acute intra-abdominal abnormality  Bilateral subcentimeter nonobstructing intrarenal calculi  No hydronephrosis or hydroureter   Workstation performed: [id number]     US pelvis complete w transvaginal    Result Date: 3/27/2023  · Impression:  Normal    Workstation performed: [id number]     Incidental Findings:   · none     Test Results "Pending at Discharge (will require follow up):   · none     Outpatient Tests Requested:  · none    Complications:  none    Reason for Admission: Flank pain    Hospital Course:     Tamara Hernandez is a 23 y o  female patient who originally presented to the hospital on 3/26/2023 due to flank pain  Likely musculoskeletal   Urine culture negative  Symptoms resolved spontaneously  Medically cleared for discharge  We will follow-up outpatient with PCP in 1 week    Please see above list of diagnoses and related plan for additional information  Condition at Discharge: stable     Discharge Day Visit / Exam:     Subjective: Denies fevers, chills, cough, chest pain, shortness of breath  Vitals: Blood Pressure: 129/68 (03/27/23 2253)  Pulse: 80 (03/27/23 2253)  Temperature: (!) 97 3 °F (36 3 °C) (03/27/23 2253)  Temp Source: Tympanic (03/26/23 2244)  Respirations: 18 (03/27/23 2253)  Height: 5' 10\" (177 8 cm) (03/27/23 0941)  Weight - Scale: 111 kg (245 lb 9 5 oz) (03/27/23 0941)  SpO2: 94 % (03/27/23 2253)  Exam:   Physical Exam  Constitutional:       General: She is not in acute distress  Appearance: She is well-developed  She is not diaphoretic  HENT:      Head: Normocephalic and atraumatic  Nose: Nose normal       Mouth/Throat:      Pharynx: No oropharyngeal exudate  Eyes:      General: No scleral icterus  Right eye: No discharge  Left eye: No discharge  Conjunctiva/sclera: Conjunctivae normal    Neck:      Thyroid: No thyromegaly  Vascular: No JVD  Cardiovascular:      Rate and Rhythm: Normal rate and regular rhythm  Heart sounds: Normal heart sounds  No murmur heard  No friction rub  No gallop  Pulmonary:      Effort: Pulmonary effort is normal  No respiratory distress  Breath sounds: Normal breath sounds  No wheezing or rales  Chest:      Chest wall: No tenderness  Abdominal:      General: Bowel sounds are normal  There is no distension        Palpations: " Abdomen is soft  Tenderness: There is no abdominal tenderness  There is no guarding or rebound  Musculoskeletal:         General: No tenderness or deformity  Normal range of motion  Cervical back: Normal range of motion and neck supple  Skin:     General: Skin is warm and dry  Findings: No erythema or rash  Neurological:      Mental Status: She is alert  Mental status is at baseline  Cranial Nerves: No cranial nerve deficit  Sensory: No sensory deficit  Motor: No abnormal muscle tone  Coordination: Coordination normal          Discussion with Family: pt    Discharge instructions/Information to patient and family:   See after visit summary for information provided to patient and family  Provisions for Follow-Up Care:  See after visit summary for information related to follow-up care and any pertinent home health orders  Disposition:     Home    For Discharges to [facility]:   · Not Applicable to this Patient - Not Applicable to this Patient    Planned Readmission: none     Discharge Statement:  I spent 60 minutes discharging the patient  This time was spent on the day of discharge  I had direct contact with the patient on the day of discharge  Greater than 50% of the total time was spent examining patient, answering all patient questions, arranging and discussing plan of care with patient as well as directly providing post-discharge instructions  Additional time then spent on discharge activities  Discharge Medications:  See after visit summary for reconciled discharge medications provided to patient and family        ** Please Note: This note has been constructed using a voice recognition system **

## 2023-03-28 NOTE — UTILIZATION REVIEW
Initial Clinical Review    Admission: Date/Time/Statement:   Admission Orders (From admission, onward)     Ordered        03/27/23 0918  Place in Observation  Once                      Orders Placed This Encounter   Procedures   • Place in Observation     Standing Status:   Standing     Number of Occurrences:   1     Order Specific Question:   Level of Care     Answer:   Med Surg [16]     ED Arrival Information     Expected   -    Arrival   3/26/2023 22:40    Acuity   Urgent            Means of arrival   Walk-In    Escorted by   Self    Service   Hospitalist    Admission type   Emergency            Arrival complaint   Flank Pain           Chief Complaint   Patient presents with   • Flank Pain     Hx of kidney stones  Reports R flank pain  Denies urinary symptoms  C/o nausea and vomiting, reports typical when has kidney stones        Initial Presentation: 23 y o  female with PMH of  lithiasis, asthma, ovarian cyst, GERD presents to the ER with complaint of left flank x1 day  She reports initially occurring left lower quadrant abd pain which eventually radiated to her back  She reports of nausea and nonbloody, nonbilious vomiting  Plan: Observation for flank pain, SIRS, bacteriuria: pain management with Toradol, follow urine and blood cultures, IV ceftriaxone         ED Triage Vitals   Temperature Pulse Respirations Blood Pressure SpO2   03/26/23 2244 03/26/23 2244 03/26/23 2244 03/26/23 2244 03/26/23 2309   98 °F (36 7 °C) (!) 112 20 143/79 99 %      Temp Source Heart Rate Source Patient Position - Orthostatic VS BP Location FiO2 (%)   03/26/23 2244 03/26/23 2244 03/26/23 2244 03/26/23 2244 --   Tympanic Monitor Sitting Left arm       Pain Score       03/26/23 2324       10 - Worst Possible Pain          Wt Readings from Last 1 Encounters:   03/27/23 111 kg (245 lb 9 5 oz)     Additional Vital Signs:     Date/Time Temp Pulse Resp BP MAP (mmHg) SpO2 O2 Device   03/27/23 22:53:20 97 3 °F (36 3 °C) Abnormal  80 18 129/68 88 94 % --   03/27/23 22:52:53 97 3 °F (36 3 °C) Abnormal  71 18 129/68 88 95 % --   03/27/23 2100 -- -- -- -- -- -- None (Room air)   03/27/23 15:21:50 97 5 °F (36 4 °C) 89 18 95/58 70 97 % --   03/27/23 1020 -- -- -- -- -- -- None (Room air)   03/27/23 09:41:38 -- -- 16 130/77 95 -- --   03/27/23 0900 -- 66 18 131/60 86 97 % None (Room air)   03/27/23 0700 -- 74 18 96/51 69 96 % None (Room air)   03/27/23 0600 -- 68 14 112/59 79 96 % --   03/27/23 0500 -- 70 16 106/52 -- 98 % --   03/27/23 0249 -- 92 -- -- -- -- --   03/26/23 2309 -- -- -- -- -- 99 % None (Room air)     Pertinent Labs/Diagnostic Test Results:   US pelvis complete w transvaginal   Final Result by Richy Lunsford MD (03/27 0831)       Normal                                Workstation performed: [id number]         CT renal stone study abdomen pelvis wo contrast   Final Result by Jass Dawn MD (03/27 0024)      No acute intra-abdominal abnormality  Bilateral subcentimeter nonobstructing intrarenal calculi  No hydronephrosis or hydroureter           Workstation performed: [id number]               Results from last 7 days   Lab Units 03/28/23  0820 03/26/23  2325   WBC Thousand/uL 7 69 12 95*   HEMOGLOBIN g/dL 11 5 12 7   HEMATOCRIT % 34 5* 37 7   PLATELETS Thousands/uL 303 400*   NEUTROS ABS Thousands/µL  --  8 39*         Results from last 7 days   Lab Units 03/26/23  2325   SODIUM mmol/L 134*   POTASSIUM mmol/L 3 2*   CHLORIDE mmol/L 104   CO2 mmol/L 28   ANION GAP mmol/L 2*   BUN mg/dL 8   CREATININE mg/dL 0 62   EGFR ml/min/1 73sq m 130   CALCIUM mg/dL 9 6     Results from last 7 days   Lab Units 03/26/23  2325   AST U/L 13   ALT U/L 16   ALK PHOS U/L 97   TOTAL PROTEIN g/dL 7 8   ALBUMIN g/dL 3 8   TOTAL BILIRUBIN mg/dL 0 27         Results from last 7 days   Lab Units 03/26/23  2325   GLUCOSE RANDOM mg/dL 105           Results from last 7 days   Lab Units 03/26/23  2325   LIPASE u/L 126           Results from last 7 days Lab Units 03/26/23  2309   CLARITY UA  Clear   COLOR UA  Light Yellow   SPEC GRAV UA  1 011   PH UA  6 0   GLUCOSE UA mg/dl Negative   KETONES UA mg/dl Negative   BLOOD UA  Moderate*   PROTEIN UA mg/dl Negative   NITRITE UA  Negative   BILIRUBIN UA  Negative   UROBILINOGEN UA (BE) mg/dl <2 0   LEUKOCYTES UA  Negative   WBC UA /hpf 1-2   RBC UA /hpf 2-4*   BACTERIA UA /hpf Innumerable*   EPITHELIAL CELLS WET PREP /hpf Occasional           Results from last 7 days   Lab Units 03/27/23  1228 03/26/23  2309   BLOOD CULTURE  Received in Microbiology Lab  Culture in Progress    --    URINE CULTURE   --  <10,000 cfu/ml         ED Treatment:   Medication Administration from 03/26/2023 2240 to 03/27/2023 9587       Date/Time Order Dose Route Action     03/26/2023 2324 EDT ondansetron (ZOFRAN) injection 4 mg 4 mg Intravenous Given     03/26/2023 2324 EDT ketorolac (TORADOL) injection 15 mg 15 mg Intravenous Given     03/27/2023 0009 EDT HYDROmorphone (DILAUDID) injection 0 5 mg 0 5 mg Intravenous Given     03/27/2023 0011 EDT sodium chloride 0 9 % bolus 1,000 mL 1,000 mL Intravenous New Bag     03/27/2023 0131 EDT cefTRIAXone (ROCEPHIN) 1,000 mg in dextrose 5 % 50 mL IVPB 1,000 mg Intravenous New Bag     03/27/2023 0139 EDT HYDROmorphone HCl (DILAUDID) injection 0 2 mg 0 2 mg Intravenous Given     03/27/2023 0300 EDT ondansetron (ZOFRAN) injection 4 mg 4 mg Intravenous Given     03/27/2023 0300 EDT multi-electrolyte (ISOLYTE-S PH 7 4) bolus 500 mL 500 mL Intravenous New Bag     03/27/2023 0407 EDT potassium chloride (K-DUR,KLOR-CON) CR tablet 20 mEq 20 mEq Oral Given     03/27/2023 0945 EDT ondansetron (ZOFRAN) injection 4 mg 4 mg Intravenous Given     03/27/2023 0729 EDT ketorolac (TORADOL) injection 15 mg 15 mg Intravenous Given        Past Medical History:   Diagnosis Date   • Exercise-induced asthma    • Kidney stone    • Ovarian cyst      Present on Admission:  • Moderate persistent asthma  • Gastroesophageal reflux disease  • Obesity (BMI 30 0-34  9)      Admitting Diagnosis: Pyelonephritis [N12]  Flank pain [R10 9]  Intractable pain [R52]  Age/Sex: 23 y o  female  Admission Orders:  Scheduled Medications:  budesonide-formoterol, 2 puff, Inhalation, BID  cefTRIAXone, 1,000 mg, Intravenous, Q24H  fluticasone, 1 spray, Nasal, Daily  multivitamin stress formula, 1 tablet, Oral, Daily  pantoprazole, 40 mg, Oral, BID AC  saccharomyces boulardii, 250 mg, Oral, BID      Continuous IV Infusions:  multi-electrolyte, 100 mL/hr, Intravenous, Continuous      PRN Meds:  acetaminophen, 650 mg, Oral, Q6H PRN  albuterol, 2 puff, Inhalation, Q6H PRN  ketorolac, 15 mg, Intravenous, Q6H PRN  ondansetron, 4 mg, Intravenous, Q6H PRN  oxyCODONE, 2 5 mg, Oral, Q4H PRN  oxyCODONE, 5 mg, Oral, Q4H PRN        None    Network Utilization Review Department  ATTENTION: Please call with any questions or concerns to [phone] and carefully listen to the prompts so that you are directed to the right person  All voicemails are confidential   [place] all requests for admission clinical reviews, approved or denied determinations and any other requests to dedicated fax number below belonging to the campus where the patient is receiving treatment   List of dedicated fax numbers for the Facilities:  [address] DENIALS (Administrative/Medical Necessity) [phone]   [address] (Maternity/NICU/Pediatrics) [phone]   [address] [phone]   [facility] [phone]   [facility] [phone]   [address] [address] [facility] [address] [place] [address] [phone]

## 2023-03-28 NOTE — ASSESSMENT & PLAN NOTE
Met SIRs criteria with leukocytosis (though chronic), tachycardia  Resolved  Not secondary to infection

## 2023-03-29 ENCOUNTER — OFFICE VISIT (OUTPATIENT)
Dept: GASTROENTEROLOGY | Facility: CLINIC | Age: 23
End: 2023-03-29

## 2023-03-29 VITALS
WEIGHT: 246.2 LBS | DIASTOLIC BLOOD PRESSURE: 64 MMHG | BODY MASS INDEX: 35.24 KG/M2 | TEMPERATURE: 98.1 F | HEIGHT: 70 IN | SYSTOLIC BLOOD PRESSURE: 120 MMHG

## 2023-03-29 DIAGNOSIS — K27.9 PUD (PEPTIC ULCER DISEASE): ICD-10-CM

## 2023-03-29 RX ORDER — BUSPIRONE HYDROCHLORIDE 7.5 MG/1
7.5 TABLET ORAL 2 TIMES DAILY
COMMUNITY
Start: 2023-03-20

## 2023-03-29 RX ORDER — OMEPRAZOLE 40 MG/1
40 CAPSULE, DELAYED RELEASE ORAL DAILY
Qty: 30 CAPSULE | Refills: 5 | Status: SHIPPED | OUTPATIENT
Start: 2023-03-29 | End: 2023-09-25

## 2023-03-29 NOTE — PROGRESS NOTES
[facility] - Outpatient Follow-up Note  Waqas Chong 23 y o  female MRN: [id number]  Encounter: [id number]          ASSESSMENT AND PLAN:      1   Nausea and vomiting  2  Esophagitis  3  Abdominal pain  4  Bloating  5  Duodenal ulcers  6  Constipation    Symptoms are better controlled  Sometimes upper GI symptoms happen with stress and anxiety  Patient has been discussing with primary care physician about starting anxiety medication  She was started on BuSpar recently which may help  We discussed about decreasing PPI to 40 mg daily and monitoring symptoms  Cautioned patient about rebound reflux  Decrease dose to omeprazole 40 mg daily to be taken 30 discs minutes prior to breakfast   Patient to be on strict gluten-free diet  If patient has symptoms despite being on gluten-free diet then would reevaluate with EGD given history of duodenal ulcers  Continue MiraLAX as needed  RTC 6 months  Leia Madison MD  Gastroenterology  [facility]  Date: March 29, 2023    ______________________________________________________________________    SUBJECTIVE: 60-year-old with esophagitis, duodenal ulcer, constipation presents for follow-up  EGD in the past showed esophagitis and gastritis with biopsy negative for H  pylori  Small bowel biopsy as well as celiac serology were all negative  During last visit patient was taking MiraLAX and Dulcolax and having more frequent bowel movements  She was taking PPI 40 mg daily  Abdominal pain and nausea decreased but not completely controlled  Omeprazole was increased to twice daily and gastric emptying study was ordered  Gastric emptying study was normal   Plan was to repeat EGD if symptoms not controlled with PPI twice daily and if constipation not completely resolved with bowel regimen then pursue colonoscopy  Patient was recently admitted to the hospital with UTI    She was discharged on Keflex  Currently, patient reports that her abdominal pain, bloating as well as nausea usually happen more with stress and anxiety  Sometimes she has abdominal pain and bloating after meal intake usually with exposure to gluten  She feels that she has family history of celiac disease so may be sensitive to gluten  She has been taking omeprazole twice daily  Her bowel movements are regular usually  She has been not requiring laxatives  REVIEW OF SYSTEMS IS OTHERWISE NEGATIVE  Historical Information   Past Medical History:   Diagnosis Date   • Exercise-induced asthma    • Kidney stone    • Ovarian cyst      Past Surgical History:   Procedure Laterality Date   • APPENDECTOMY     • WISDOM TOOTH EXTRACTION     • WISDOM TOOTH EXTRACTION       Social History   Social History     Substance and Sexual Activity   Alcohol Use Yes    Comment: social      Social History     Substance and Sexual Activity   Drug Use Never     Social History     Tobacco Use   Smoking Status Never   Smokeless Tobacco Never     Family History   Problem Relation Age of Onset   • Heart disease Maternal Grandfather        Meds/Allergies       Current Outpatient Medications:   •  albuterol (PROVENTIL HFA,VENTOLIN HFA) 90 mcg/act inhaler  •  ascorbic acid (VITAMIN C) 500 MG tablet  •  bisacodyl (DULCOLAX) 5 mg EC tablet  •  cephalexin (KEFLEX) 500 mg capsule  •  fluticasone (FLONASE) 50 mcg/act nasal spray  •  Multiple Vitamin (Multi-Day) TABS  •  norethindrone-ethinyl estradiol (Junel 1/20) 1-20 MG-MCG per tablet  •  omeprazole (PriLOSEC) 40 MG capsule  •  ondansetron (Zofran ODT) 4 mg disintegrating tablet  •  Probiotic Product (Misc Intestinal Vikki Regulat) CAPS  •  promethazine (PHENERGAN) 12 5 MG tablet  •  sucralfate (CARAFATE) 1 g tablet  •  Symbicort 160-4 5 MCG/ACT inhaler  •  Zofran ODT 4 MG disintegrating tablet    No Known Allergies        Objective     Last menstrual period 03/05/2023   There is no height or weight on file to calculate BMI  PHYSICAL EXAM:      General Appearance:   Alert, cooperative, no distress   HEENT:   Normocephalic, atraumatic, anicteric      Neck:  Supple, symmetrical, trachea midline   Lungs:   Clear to auscultation bilaterally; no rales, rhonchi or wheezing; respirations unlabored    Heart[de-identified]   Regular rate and rhythm; no murmur, rub, or gallop  Abdomen:   Soft, non-tender, non-distended; normal bowel sounds; no masses, no organomegaly    Genitalia:   Deferred    Rectal:   Deferred    Extremities:  No cyanosis, clubbing or edema    Pulses:  2+ and symmetric    Skin:  No jaundice, rashes, or lesions    Lymph nodes:  No palpable cervical lymphadenopathy        Lab Results:   No visits with results within 1 Day(s) from this visit     Latest known visit with results is:   Admission on 03/26/2023, Discharged on 03/28/2023   Component Date Value   • WBC 03/26/2023 12 95 (H)    • RBC 03/26/2023 4 33    • Hemoglobin 03/26/2023 12 7    • Hematocrit 03/26/2023 37 7    • MCV 03/26/2023 87    • MCH 03/26/2023 29 3    • MCHC 03/26/2023 33 7    • RDW 03/26/2023 12 7    • MPV 03/26/2023 10 0    • Platelets 11/65/0452 400 (H)    • nRBC 03/26/2023 0    • Neutrophils Relative 03/26/2023 63    • Immat GRANS % 03/26/2023 1    • Lymphocytes Relative 03/26/2023 24    • Monocytes Relative 03/26/2023 7    • Eosinophils Relative 03/26/2023 4    • Basophils Relative 03/26/2023 1    • Neutrophils Absolute 03/26/2023 8 39 (H)    • Immature Grans Absolute 03/26/2023 0 07    • Lymphocytes Absolute 03/26/2023 3 09    • Monocytes Absolute 03/26/2023 0 85    • Eosinophils Absolute 03/26/2023 0 49    • Basophils Absolute 03/26/2023 0 06    • Sodium 03/26/2023 134 (L)    • Potassium 03/26/2023 3 2 (L)    • Chloride 03/26/2023 104    • CO2 03/26/2023 28    • ANION GAP 03/26/2023 2 (L)    • BUN 03/26/2023 8    • Creatinine 03/26/2023 0 62    • Glucose 03/26/2023 105    • Calcium 03/26/2023 9 6    • AST 03/26/2023 13    • ALT 03/26/2023 16    • Alkaline Phosphatase 03/26/2023 97    • Total Protein 03/26/2023 7 8    • Albumin 03/26/2023 3 8    • Total Bilirubin 03/26/2023 0 27    • eGFR 03/26/2023 130    • Lipase 03/26/2023 126    • Color, UA 03/26/2023 Light Yellow    • Clarity, UA 03/26/2023 Clear    • Specific Gravity, UA 03/26/2023 1 011    • pH, UA 03/26/2023 6 0    • Leukocytes, UA 03/26/2023 Negative    • Nitrite, UA 03/26/2023 Negative    • Protein, UA 03/26/2023 Negative    • Glucose, UA 03/26/2023 Negative    • Ketones, UA 03/26/2023 Negative    • Urobilinogen, UA 03/26/2023 <2 0    • Bilirubin, UA 03/26/2023 Negative    • Occult Blood, UA 03/26/2023 Moderate (A)    • EXT Preg Test, Ur 03/26/2023 Negative    • Control 03/26/2023 Valid    • RBC, UA 03/26/2023 2-4 (A)    • WBC, UA 03/26/2023 1-2    • Epithelial Cells 03/26/2023 Occasional    • Bacteria, UA 03/26/2023 Innumerable (A)    • Hyaline Casts, UA 03/26/2023 0-3 (A)    • Urine Culture 03/26/2023 <10,000 cfu/ml    • Blood Culture 03/27/2023 No Growth at 24 hrs  • Blood Culture 03/28/2023 No Growth at 24 hrs  • WBC 03/28/2023 7 69    • RBC 03/28/2023 3 90    • Hemoglobin 03/28/2023 11 5    • Hematocrit 03/28/2023 34 5 (L)    • MCV 03/28/2023 89    • MCH 03/28/2023 29 5    • MCHC 03/28/2023 33 3    • RDW 03/28/2023 12 9    • Platelets 62/99/3666 303    • MPV 03/28/2023 10 0    • Sodium 03/28/2023 138    • Potassium 03/28/2023 3 9    • Chloride 03/28/2023 111 (H)    • CO2 03/28/2023 24    • ANION GAP 03/28/2023 3 (L)    • BUN 03/28/2023 5    • Creatinine 03/28/2023 0 49 (L)    • Glucose 03/28/2023 105    • Glucose, Fasting 03/28/2023 105 (H)    • Calcium 03/28/2023 8 4    • eGFR 03/28/2023 140    • Procalcitonin 03/28/2023 <0 05          Radiology Results:   CT renal stone study abdomen pelvis wo contrast    Result Date: 3/27/2023  Narrative: CT ABDOMEN AND PELVIS WITHOUT IV CONTRAST - LOW DOSE RENAL STONE INDICATION:   L flank pain  COMPARISON:  CT abdomen pelvis dated September 29, 2022  TECHNIQUE:  Low radiation dose thin section CT examination of the abdomen and pelvis was performed without intravenous or oral contrast according to a protocol specifically designed to evaluate for urinary tract calculus  Axial, sagittal, and coronal 2D  reformatted images were created from the source data and submitted for interpretation  Evaluation for pathology in the abdomen and pelvis that is unrelated to urinary tract calculi is limited  Radiation dose length product (DLP) for this visit:  777 03 mGy-cm   This examination, like all CT scans performed in the [facility], was performed utilizing techniques to minimize radiation dose exposure, including the use of iterative  reconstruction and automated exposure control  URINARY TRACT FINDINGS: RIGHT KIDNEY AND URETER:  There are multiple nonobstructing intrarenal calculi measuring up to 4 mm  There is a midpole cyst measuring 3 3 cm  No hydronephrosis or hydroureter  LEFT KIDNEY AND URETER:  There are a couple of nonobstructing intrarenal calculi measuring up to 2 mm  There is a midpole cyst measuring 2 1 cm  No hydronephrosis or hydroureter  URINARY BLADDER:  Unremarkable  ADDITIONAL FINDINGS: LOWER CHEST:  No clinically significant abnormality identified in the visualized lower chest  SOLID VISCERA: Limited low radiation dose noncontrast CT evaluation demonstrates no clinically significant abnormality of the imaged portions of the liver, spleen, pancreas, or adrenal glands  GALLBLADDER/BILIARY TREE:  No calcified gallstones  No pericholecystic inflammatory change  No biliary dilatation  STOMACH AND BOWEL:  Unremarkable  APPENDIX:  There are expected postoperative changes of appendectomy  ABDOMINOPELVIC CAVITY:  No ascites  No pneumoperitoneum  No lymphadenopathy  REPRODUCTIVE ORGANS:  Unremarkable for patient's age  ABDOMINAL WALL/INGUINAL REGIONS:  Unremarkable  OSSEOUS STRUCTURES:  No acute fracture or destructive osseous lesion  Impression: No acute intra-abdominal abnormality  Bilateral subcentimeter nonobstructing intrarenal calculi  No hydronephrosis or hydroureter  Workstation performed: [id number]     US pelvis complete w transvaginal    Result Date: 3/27/2023  Narrative: PELVIC ULTRASOUND, COMPLETE INDICATION:  The patient is 23years old  left flank pain  COMPARISON: CT 3/26/2023, ultrasound 6/27/2022  TECHNIQUE:   Transabdominal pelvic ultrasound was performed in sagittal and transverse planes with a curvilinear transducer  Additional transvaginal imaging was performed to better evaluate the endometrium and ovaries  Imaging included volumetric sweeps as well as traditional still imaging technique  FINDINGS: UTERUS: The uterus is retroverted in position, measuring 9 4 x 4 0 x 4 2 cm  The uterus has a normal contour and echotexture  The cervix appears within normal limits  ENDOMETRIUM:  The endometrial echo complex has an AP caliber of 10 0 mm  Its appearance is within normal limits for age and cycle and shows no filling defects    OVARIES/ADNEXA: Right ovary:  4 4 x 3 1 x 3 1 cm  21 9 mL  Left ovary:  4 1 x 2 4 x 1 8 cm  9 4 mL  Ovarian Doppler flow is within normal limits  No suspicious ovarian or adnexal abnormality  OTHER: No free fluid or loculated fluid collections       Impression:  Normal    Workstation performed: [id number]

## 2023-04-01 LAB — BACTERIA BLD CULT: NORMAL

## 2023-04-02 LAB — BACTERIA BLD CULT: NORMAL

## 2023-04-28 ENCOUNTER — LAB REQUISITION (OUTPATIENT)
Dept: LAB | Facility: HOSPITAL | Age: 23
End: 2023-04-28
Payer: COMMERCIAL

## 2023-04-28 DIAGNOSIS — N39.0 URINARY TRACT INFECTION, SITE NOT SPECIFIED: ICD-10-CM

## 2023-04-29 LAB — BACTERIA UR CULT: NORMAL

## 2023-05-23 ENCOUNTER — HOSPITAL ENCOUNTER (EMERGENCY)
Facility: HOSPITAL | Age: 23
Discharge: HOME | End: 2023-05-23
Attending: EMERGENCY MEDICINE

## 2023-05-23 ENCOUNTER — APPOINTMENT (EMERGENCY)
Dept: CT IMAGING | Facility: HOSPITAL | Age: 23
End: 2023-05-23

## 2023-05-23 VITALS
RESPIRATION RATE: 17 BRPM | TEMPERATURE: 98.5 F | SYSTOLIC BLOOD PRESSURE: 121 MMHG | DIASTOLIC BLOOD PRESSURE: 55 MMHG | HEART RATE: 77 BPM | WEIGHT: 246.91 LBS | OXYGEN SATURATION: 98 % | BODY MASS INDEX: 35.43 KG/M2

## 2023-05-23 DIAGNOSIS — N20.0 KIDNEY STONE: Primary | ICD-10-CM

## 2023-05-23 LAB
ALBUMIN SERPL BCP-MCNC: 4.4 G/DL (ref 3.5–5)
ALP SERPL-CCNC: 69 U/L (ref 34–104)
ALT SERPL W P-5'-P-CCNC: 7 U/L (ref 7–52)
ANION GAP SERPL CALCULATED.3IONS-SCNC: 9 MMOL/L (ref 4–13)
APTT PPP: 27 SECONDS (ref 23–37)
AST SERPL W P-5'-P-CCNC: 12 U/L (ref 13–39)
BACTERIA UR QL AUTO: ABNORMAL /HPF
BASOPHILS # BLD AUTO: 0.05 THOUSANDS/ÂΜL (ref 0–0.1)
BASOPHILS NFR BLD AUTO: 1 % (ref 0–1)
BILIRUB SERPL-MCNC: 0.4 MG/DL (ref 0.2–1)
BILIRUB UR QL STRIP: NEGATIVE
BUN SERPL-MCNC: 8 MG/DL (ref 5–25)
CALCIUM SERPL-MCNC: 9.6 MG/DL (ref 8.4–10.2)
CHLORIDE SERPL-SCNC: 102 MMOL/L (ref 96–108)
CLARITY UR: ABNORMAL
CO2 SERPL-SCNC: 26 MMOL/L (ref 21–32)
COLOR UR: YELLOW
CREAT SERPL-MCNC: 0.78 MG/DL (ref 0.6–1.3)
EOSINOPHIL # BLD AUTO: 0.23 THOUSAND/ÂΜL (ref 0–0.61)
EOSINOPHIL NFR BLD AUTO: 2 % (ref 0–6)
ERYTHROCYTE [DISTWIDTH] IN BLOOD BY AUTOMATED COUNT: 12 % (ref 11.6–15.1)
EXT PREGNANCY TEST URINE: NEGATIVE
EXT. CONTROL: NORMAL
GFR SERPL CREATININE-BSD FRML MDRD: 109 ML/MIN/1.73SQ M
GLUCOSE SERPL-MCNC: 89 MG/DL (ref 65–140)
GLUCOSE UR STRIP-MCNC: NEGATIVE MG/DL
HCT VFR BLD AUTO: 43 % (ref 34.8–46.1)
HGB BLD-MCNC: 14.1 G/DL (ref 11.5–15.4)
HGB UR QL STRIP.AUTO: ABNORMAL
IMM GRANULOCYTES # BLD AUTO: 0.03 THOUSAND/UL (ref 0–0.2)
IMM GRANULOCYTES NFR BLD AUTO: 0 % (ref 0–2)
INR PPP: 0.99 (ref 0.84–1.19)
KETONES UR STRIP-MCNC: NEGATIVE MG/DL
LEUKOCYTE ESTERASE UR QL STRIP: NEGATIVE
LIPASE SERPL-CCNC: 19 U/L (ref 11–82)
LYMPHOCYTES # BLD AUTO: 2.27 THOUSANDS/ÂΜL (ref 0.6–4.47)
LYMPHOCYTES NFR BLD AUTO: 21 % (ref 14–44)
MCH RBC QN AUTO: 29.1 PG (ref 26.8–34.3)
MCHC RBC AUTO-ENTMCNC: 32.8 G/DL (ref 31.4–37.4)
MCV RBC AUTO: 89 FL (ref 82–98)
MONOCYTES # BLD AUTO: 0.82 THOUSAND/ÂΜL (ref 0.17–1.22)
MONOCYTES NFR BLD AUTO: 7 % (ref 4–12)
NEUTROPHILS # BLD AUTO: 7.65 THOUSANDS/ÂΜL (ref 1.85–7.62)
NEUTS SEG NFR BLD AUTO: 69 % (ref 43–75)
NITRITE UR QL STRIP: NEGATIVE
NON-SQ EPI CELLS URNS QL MICRO: ABNORMAL /HPF
NRBC BLD AUTO-RTO: 0 /100 WBCS
PH UR STRIP.AUTO: 6.5 [PH]
PLATELET # BLD AUTO: 427 THOUSANDS/UL (ref 149–390)
PMV BLD AUTO: 10 FL (ref 8.9–12.7)
POTASSIUM SERPL-SCNC: 4 MMOL/L (ref 3.5–5.3)
PROT SERPL-MCNC: 7.7 G/DL (ref 6.4–8.4)
PROT UR STRIP-MCNC: ABNORMAL MG/DL
PROTHROMBIN TIME: 13.3 SECONDS (ref 11.6–14.5)
RBC # BLD AUTO: 4.84 MILLION/UL (ref 3.81–5.12)
RBC #/AREA URNS AUTO: ABNORMAL /HPF
SODIUM SERPL-SCNC: 137 MMOL/L (ref 135–147)
SP GR UR STRIP.AUTO: 1.02 (ref 1–1.03)
UROBILINOGEN UR QL STRIP.AUTO: 0.2 E.U./DL
WBC # BLD AUTO: 11.05 THOUSAND/UL (ref 4.31–10.16)
WBC #/AREA URNS AUTO: ABNORMAL /HPF

## 2023-05-23 RX ORDER — HYDROMORPHONE HCL IN WATER/PF 6 MG/30 ML
0.2 PATIENT CONTROLLED ANALGESIA SYRINGE INTRAVENOUS ONCE
Status: COMPLETED | OUTPATIENT
Start: 2023-05-23 | End: 2023-05-23

## 2023-05-23 RX ORDER — KETOROLAC TROMETHAMINE 30 MG/ML
15 INJECTION, SOLUTION INTRAMUSCULAR; INTRAVENOUS ONCE
Status: COMPLETED | OUTPATIENT
Start: 2023-05-23 | End: 2023-05-23

## 2023-05-23 RX ORDER — OXYCODONE HYDROCHLORIDE 5 MG/1
5 TABLET ORAL EVERY 6 HOURS PRN
Qty: 11 TABLET | Refills: 0 | Status: SHIPPED | OUTPATIENT
Start: 2023-05-23 | End: 2023-05-29

## 2023-05-23 RX ORDER — TAMSULOSIN HYDROCHLORIDE 0.4 MG/1
0.4 CAPSULE ORAL
Qty: 3 CAPSULE | Refills: 0 | Status: SHIPPED | OUTPATIENT
Start: 2023-05-23 | End: 2023-05-29

## 2023-05-23 RX ORDER — CEPHALEXIN 500 MG/1
500 CAPSULE ORAL EVERY 6 HOURS SCHEDULED
Qty: 20 CAPSULE | Refills: 0 | Status: SHIPPED | OUTPATIENT
Start: 2023-05-23 | End: 2023-05-29

## 2023-05-23 RX ORDER — HYDROMORPHONE HCL/PF 1 MG/ML
0.5 SYRINGE (ML) INJECTION ONCE
Status: COMPLETED | OUTPATIENT
Start: 2023-05-23 | End: 2023-05-23

## 2023-05-23 RX ORDER — ONDANSETRON 2 MG/ML
4 INJECTION INTRAMUSCULAR; INTRAVENOUS ONCE
Status: COMPLETED | OUTPATIENT
Start: 2023-05-23 | End: 2023-05-23

## 2023-05-23 RX ORDER — NAPROXEN 500 MG/1
500 TABLET ORAL 2 TIMES DAILY WITH MEALS
Qty: 10 TABLET | Refills: 0 | Status: SHIPPED | OUTPATIENT
Start: 2023-05-23 | End: 2023-05-28

## 2023-05-23 RX ADMIN — HYDROMORPHONE HYDROCHLORIDE 0.5 MG: 1 INJECTION, SOLUTION INTRAMUSCULAR; INTRAVENOUS; SUBCUTANEOUS at 15:26

## 2023-05-23 RX ADMIN — HYDROMORPHONE HYDROCHLORIDE 0.2 MG: 0.2 INJECTION, SOLUTION INTRAMUSCULAR; INTRAVENOUS; SUBCUTANEOUS at 13:18

## 2023-05-23 RX ADMIN — SODIUM CHLORIDE 1000 ML: 0.9 INJECTION, SOLUTION INTRAVENOUS at 13:15

## 2023-05-23 RX ADMIN — KETOROLAC TROMETHAMINE 15 MG: 30 INJECTION, SOLUTION INTRAMUSCULAR; INTRAVENOUS at 13:16

## 2023-05-23 RX ADMIN — ONDANSETRON 4 MG: 2 INJECTION INTRAMUSCULAR; INTRAVENOUS at 13:30

## 2023-05-23 RX ADMIN — ONDANSETRON 4 MG: 2 INJECTION INTRAMUSCULAR; INTRAVENOUS at 15:26

## 2023-05-23 NOTE — ED PROVIDER NOTES
Final Diagnosis:  1  Kidney stone        Chief Complaint   Patient presents with   • Flank Pain     Pt c/o bilateral flank pain since yesterday, unable to urinate  Hx of pylo     HPI  Patient presents for evaluation of right-sided flank pain  Patient states that this flank pain started yesterday in her right flank and then somewhat radiates down towards her right lower quadrant  She states that she was trying to stay home and right of the pain and she thought maybe this was a kidney stone but this increased throughout the course of today but causing her to present now for further evaluation  She endorses nausea without any episodes of vomiting  States that she does have a history of appendectomy in the past   Denies any dysuria or hematuria  States that her menstrual cycles have been normal   Denies any other vaginal discharge  Review records show that the patient was admitted to another hospital at the end of March  At that time for some intractable flank pain  UA was unremarkable  She was observed in the hospital overnight and then discharged  Patient has had multiple ultrasounds  She states that this secondary to bilateral ovarian cysts which this current pain feels different than her prior  Previous CT scan did show multiple kidney stones up to 4 mm  Unless otherwise specified:  - No language barrier    - History obtained from patient  - There are no limitations to the history obtained  - Previous charting was reviewed    PMH:   has a past medical history of Exercise-induced asthma, Kidney stone, and Ovarian cyst     PSH:   has a past surgical history that includes Appendectomy; [place] tooth extraction; [place] tooth extraction; and Upper gastrointestinal endoscopy  ROS:  Review of Systems   - 13 point ROS was performed and all are normal unless stated in the history above  - Nursing note reviewed  Vitals reviewed  - Orders placed by myself and/or advanced practitioner / resident  PE:   Vitals:    05/23/23 1224 05/23/23 1415 05/23/23 1430   BP: (!) 171/84  121/55   BP Location: Left arm     Pulse: 92 69 77   Resp: 18 17    Temp: 98 5 °F (36 9 °C)     SpO2: 98% 98% 98%   Weight: 112 kg (246 lb 14 6 oz)       Vitals reviewed by me  Patient appears uncomfortable in bed  She does have some right-sided flank tenderness  Nothing on the left  No rebound or guarding of the abdomen  Saturating well on room air  No actual work of breathing  Unless otherwise specified above:    General: VS reviewed  Appears in NAD    Head: Normocephalic, atraumatic  Eyes: EOM-I      ENT: Atraumatic external nose and ears  No drooling  Neck: No JVD  CV: No pallor noted  Lungs:   No tachypnea  No respiratory distress    Abdomen:  Soft, non-tender, non-distended    MSK:   No obvious deformity    Skin: Dry, intact  No obvious rash  Psychiatric/Behavioral: Appropriate mood and affect   Exam: deferred    Physical Exam     Procedures   A:  - Nursing note reviewed  ED Course as of 05/23/23 1559   Tue May 23, 2023   1317 PREGNANCY TEST URINE: Negative   1321 Blood, UA(!): Large   1406 CT renal stone study abdomen pelvis without contrast  On my interpretation appears to have some hydroureter, possible kidney stone at the [facility]     CT renal stone study abdomen pelvis without contrast   Final Result      Distal right ureteral calculus, 5 mm  Right hydroureteronephrosis  Additional nonobstructing intrarenal calculi on the order of between 2 and 4 mm in size bilaterally           Workstation performed: [id number]           Orders Placed This Encounter   Procedures   • Urine culture   • CT renal stone study abdomen pelvis without contrast   • CBC and differential   • Protime-INR   • APTT   • Comprehensive metabolic panel   • Lipase   • UA w Reflex to Microscopic w Reflex to Culture   • Urine Microscopic   • POCT pregnancy, urine     Labs Reviewed   CBC AND DIFFERENTIAL - Abnormal Result Value Ref Range Status    WBC 11 05 (*) 4 31 - 10 16 Thousand/uL Final    RBC 4 84  3 81 - 5 12 Million/uL Final    Hemoglobin 14 1  11 5 - 15 4 g/dL Final    Hematocrit 43 0  34 8 - 46 1 % Final    MCV 89  82 - 98 fL Final    MCH 29 1  26 8 - 34 3 pg Final    MCHC 32 8  31 4 - 37 4 g/dL Final    RDW 12 0  11 6 - 15 1 % Final    MPV 10 0  8 9 - 12 7 fL Final    Platelets 416 (*) 468 - 390 Thousands/uL Final    nRBC 0  /100 WBCs Final    Neutrophils Relative 69  43 - 75 % Final    Immat GRANS % 0  0 - 2 % Final    Lymphocytes Relative 21  14 - 44 % Final    Monocytes Relative 7  4 - 12 % Final    Eosinophils Relative 2  0 - 6 % Final    Basophils Relative 1  0 - 1 % Final    Neutrophils Absolute 7 65 (*) 1 85 - 7 62 Thousands/µL Final    Immature Grans Absolute 0 03  0 00 - 0 20 Thousand/uL Final    Lymphocytes Absolute 2 27  0 60 - 4 47 Thousands/µL Final    Monocytes Absolute 0 82  0 17 - 1 22 Thousand/µL Final    Eosinophils Absolute 0 23  0 00 - 0 61 Thousand/µL Final    Basophils Absolute 0 05  0 00 - 0 10 Thousands/µL Final   COMPREHENSIVE METABOLIC PANEL - Abnormal    Sodium 137  135 - 147 mmol/L Final    Potassium 4 0  3 5 - 5 3 mmol/L Final    Chloride 102  96 - 108 mmol/L Final    CO2 26  21 - 32 mmol/L Final    ANION GAP 9  4 - 13 mmol/L Final    BUN 8  5 - 25 mg/dL Final    Creatinine 0 78  0 60 - 1 30 mg/dL Final    Comment: Standardized to IDMS reference method    Glucose 89  65 - 140 mg/dL Final    Comment: If the patient is fasting, the ADA then defines impaired fasting glucose as > 100 mg/dL and diabetes as > or equal to 123 mg/dL  Calcium 9 6  8 4 - 10 2 mg/dL Final    AST 12 (*) 13 - 39 U/L Final    ALT 7  7 - 52 U/L Final    Comment: Specimen collection should occur prior to Sulfasalazine administration due to the potential for falsely depressed results       Alkaline Phosphatase 69  34 - 104 U/L Final    Total Protein 7 7  6 4 - 8 4 g/dL Final    Albumin 4 4  3 5 - 5 0 g/dL Final Total Bilirubin 0 40  0 20 - 1 00 mg/dL Final    Comment: Use of this assay is not recommended for patients undergoing treatment with eltrombopag due to the potential for falsely elevated results  N-acetyl-p-benzoquinone imine (metabolite of Acetaminophen) will generate erroneously low results in samples for patients that have taken an overdose of Acetaminophen      eGFR 109  ml/min/1 73sq m Final    Narrative:     National Kidney Disease Foundation guidelines for Chronic Kidney Disease (CKD):   •  Stage 1 with normal or high GFR (GFR > 90 mL/min/1 73 square meters)  •  Stage 2 Mild CKD (GFR = 60-89 mL/min/1 73 square meters)  •  Stage 3A Moderate CKD (GFR = 45-59 mL/min/1 73 square meters)  •  Stage 3B Moderate CKD (GFR = 30-44 mL/min/1 73 square meters)  •  Stage 4 Severe CKD (GFR = 15-29 mL/min/1 73 square meters)  •  Stage 5 End Stage CKD (GFR <15 mL/min/1 73 square meters)  Note: GFR calculation is accurate only with a steady state creatinine   UA W REFLEX TO MICROSCOPIC WITH REFLEX TO CULTURE - Abnormal    Color, UA Yellow   Final    Clarity, UA Cloudy   Final    Specific Gravity, UA 1 025  1 003 - 1 030 Final    pH, UA 6 5  4 5, 5 0, 5 5, 6 0, 6 5, 7 0, 7 5, 8 0 Final    Leukocytes, UA Negative  Negative Final    Nitrite, UA Negative  Negative Final    Protein, UA 30 (1+) (*) Negative mg/dl Final    Glucose, UA Negative  Negative mg/dl Final    Ketones, UA Negative  Negative mg/dl Final    Urobilinogen, UA 0 2  0 2, 1 0 E U /dl E U /dl Final    Bilirubin, UA Negative  Negative Final    Occult Blood, UA Large (*) Negative Final   URINE MICROSCOPIC - Abnormal    RBC, UA Innumerable (*) None Seen, 0-1, 1-2, 2-4, 0-5 /hpf Final    WBC, UA 10-20 (*) None Seen, 0-1, 1-2, 0-5, 2-4 /hpf Final    Epithelial Cells Occasional  None Seen, Occasional /hpf Final    Bacteria, UA Moderate (*) None Seen, Occasional /hpf Final   PROTIME-INR - Normal    Protime 13 3  11 6 - 14 5 seconds Final    INR 0 99  0 84 - 1 19 Final APTT - Normal    PTT 27  23 - 37 seconds Final    Comment: Therapeutic Heparin Range =  60-90 seconds   LIPASE - Normal    Lipase 19  11 - 82 u/L Final   POCT PREGNANCY, URINE - Normal    EXT Preg Test, Ur Negative   Final    Control Valid   Final   URINE CULTURE         Final Diagnosis:  1  Kidney stone        P:  -Patient presents for evaluation of flank pain  Given history and recent scans there is high possibility for nephrolithiasis  We will also evaluate for signs of pancreatitis, cholecystitis, urinary tract infections leading to pyelonephritis  Provide analgesia and fluids  -CT scan shows 5 mm stone on the distal right ureter  Consistent with my interpretation  Will provide with medications  Return precautions reviewed  Unless otherwise noted the patient's medications were reviewed and they should continue as directed  Medications   sodium chloride 0 9 % bolus 1,000 mL (0 mL Intravenous Stopped 5/23/23 1415)   ketorolac (TORADOL) injection 15 mg (15 mg Intravenous Given 5/23/23 1316)   HYDROmorphone HCl (DILAUDID) injection 0 2 mg (0 2 mg Intravenous Given 5/23/23 1318)   ondansetron (ZOFRAN) injection 4 mg (4 mg Intravenous Given 5/23/23 1330)   HYDROmorphone (DILAUDID) injection 0 5 mg (0 5 mg Intravenous Given 5/23/23 1526)   ondansetron (ZOFRAN) injection 4 mg (4 mg Intravenous Given 5/23/23 1526)     Time reflects when diagnosis was documented in both MDM as applicable and the Disposition within this note     Time User Action Codes Description Comment    5/23/2023  3:46 PM Karol Gaines Add [N20 0] Kidney stone       ED Disposition     ED Disposition   Discharge    Condition   Stable    Date/Time   Tue May 23, 2023  3:46 PM    Freddy Farrell discharge to home/self care                 Follow-up Information    None       Patient's Medications   Discharge Prescriptions    CEPHALEXIN (KEFLEX) 500 MG CAPSULE    Take 1 capsule (500 mg total) by mouth every 6 (six) hours for 5 days       Start Date: 2023 End Date: 2023       Order Dose: 500 mg       Quantity: 20 capsule    Refills: 0    NAPROXEN (NAPROSYN) 500 MG TABLET    Take 1 tablet (500 mg total) by mouth 2 (two) times a day with meals for 5 days       Start Date: 2023 End Date: 2023       Order Dose: 500 mg       Quantity: 10 tablet    Refills: 0    OXYCODONE (ROXICODONE) 5 IMMEDIATE RELEASE TABLET    Take 1 tablet (5 mg total) by mouth every 6 (six) hours as needed for moderate pain for up to 11 doses Max Daily Amount: 20 mg       Start Date: 2023 End Date: --       Order Dose: 5 mg       Quantity: 11 tablet    Refills: 0    TAMSULOSIN (FLOMAX) 0 4 MG    Take 1 capsule (0 4 mg total) by mouth daily with dinner for 3 days       Start Date: 2023 End Date: 2023       Order Dose: 0 4 mg       Quantity: 3 capsule    Refills: 0     No discharge procedures on file  Prior to Admission Medications   Prescriptions Last Dose Informant Patient Reported? Taking? Multiple Vitamin (Multi-Day) TABS   Yes No   Sig: Take 1 tablet by mouth   Probiotic Product (Misc Intestinal Vikki Regulat) CAPS   Yes No   Sig: Take by mouth   Symbicort 160-4 5 MCG/ACT inhaler   No No   Sig: Inhale 2 puffs 2 (two) times a day Rinse mouth after use     Zofran ODT 4 MG disintegrating tablet   No No   Sig: Take 1 tablet (4 mg total) by mouth every 6 (six) hours as needed for nausea or vomiting   albuterol (PROVENTIL HFA,VENTOLIN HFA) 90 mcg/act inhaler   No No   Sig: Inhale 2 puffs every 6 (six) hours as needed for wheezing   ascorbic acid (VITAMIN C) 500 MG tablet   Yes No   Sig: Take 500 mg by mouth   bisacodyl (DULCOLAX) 5 mg EC tablet   No No   Sig: Take 2 tablets (10 mg total) by mouth in the morning for 14 doses Colonoscopy prep   busPIRone (BUSPAR) 7 5 mg tablet   Yes No   Sig: Take 7 5 mg by mouth 2 (two) times a day   fluticasone (FLONASE) 50 mcg/act nasal spray   No No   Si spray into each nostril daily "  norethindrone-ethinyl estradiol (Junel 1/20) 1-20 MG-MCG per tablet   No No   Sig: Take 1 tablet by mouth daily   omeprazole (PriLOSEC) 40 MG capsule   No No   Sig: Take 1 capsule (40 mg total) by mouth daily   ondansetron (Zofran ODT) 4 mg disintegrating tablet   No No   Sig: Take 1 tablet (4 mg total) by mouth every 6 (six) hours as needed for nausea or vomiting   promethazine (PHENERGAN) 12 5 MG tablet   No No   Sig: Take 1 tablet (12 5 mg total) by mouth every 6 (six) hours as needed for nausea or vomiting   sucralfate (CARAFATE) 1 g tablet   No No   Sig: Take 1 tablet (1 g total) by mouth 4 (four) times a day for 12 doses      Facility-Administered Medications: None       Portions of the record may have been created with voice recognition software  Occasional wrong word or \"sound a like\" substitutions may have occurred due to the inherent limitations of voice recognition software  Read the chart carefully and recognize, using context, where substitutions have occurred      Electronically signed by:  Lazarus Nam, MD Wilfred Honor, MD  05/23/23 9326    "

## 2023-05-24 LAB
BACTERIA UR CULT: ABNORMAL
BACTERIA UR CULT: ABNORMAL

## 2023-05-28 ENCOUNTER — HOSPITAL ENCOUNTER (OUTPATIENT)
Facility: HOSPITAL | Age: 23
Setting detail: OBSERVATION
End: 2023-05-29
Attending: EMERGENCY MEDICINE | Admitting: INTERNAL MEDICINE

## 2023-05-28 DIAGNOSIS — N20.1 URETEROLITHIASIS: ICD-10-CM

## 2023-05-28 DIAGNOSIS — Z91.89 AT RISK FOR INADEQUATE PAIN CONTROL: ICD-10-CM

## 2023-05-28 DIAGNOSIS — R10.9 RIGHT FLANK PAIN: Primary | ICD-10-CM

## 2023-05-28 LAB
ALBUMIN SERPL BCP-MCNC: 4.5 G/DL (ref 3.5–5)
ALP SERPL-CCNC: 63 U/L (ref 34–104)
ALT SERPL W P-5'-P-CCNC: 7 U/L (ref 7–52)
ANION GAP SERPL CALCULATED.3IONS-SCNC: 8 MMOL/L (ref 4–13)
AST SERPL W P-5'-P-CCNC: 13 U/L (ref 13–39)
BASOPHILS # BLD AUTO: 0.06 THOUSANDS/ÂΜL (ref 0–0.1)
BASOPHILS NFR BLD AUTO: 1 % (ref 0–1)
BILIRUB SERPL-MCNC: 0.23 MG/DL (ref 0.2–1)
BILIRUB UR QL STRIP: NEGATIVE
BUN SERPL-MCNC: 14 MG/DL (ref 5–25)
CALCIUM SERPL-MCNC: 9.4 MG/DL (ref 8.4–10.2)
CHLORIDE SERPL-SCNC: 104 MMOL/L (ref 96–108)
CLARITY UR: CLEAR
CO2 SERPL-SCNC: 23 MMOL/L (ref 21–32)
COLOR UR: YELLOW
CREAT SERPL-MCNC: 0.76 MG/DL (ref 0.6–1.3)
EOSINOPHIL # BLD AUTO: 0.4 THOUSAND/ÂΜL (ref 0–0.61)
EOSINOPHIL NFR BLD AUTO: 4 % (ref 0–6)
ERYTHROCYTE [DISTWIDTH] IN BLOOD BY AUTOMATED COUNT: 12.2 % (ref 11.6–15.1)
EXT PREGNANCY TEST URINE: NEGATIVE
EXT. CONTROL: NORMAL
GFR SERPL CREATININE-BSD FRML MDRD: 113 ML/MIN/1.73SQ M
GLUCOSE SERPL-MCNC: 76 MG/DL (ref 65–140)
GLUCOSE UR STRIP-MCNC: NEGATIVE MG/DL
HCT VFR BLD AUTO: 40.4 % (ref 34.8–46.1)
HGB BLD-MCNC: 13.4 G/DL (ref 11.5–15.4)
HGB UR QL STRIP.AUTO: NEGATIVE
IMM GRANULOCYTES # BLD AUTO: 0.01 THOUSAND/UL (ref 0–0.2)
IMM GRANULOCYTES NFR BLD AUTO: 0 % (ref 0–2)
KETONES UR STRIP-MCNC: NEGATIVE MG/DL
LEUKOCYTE ESTERASE UR QL STRIP: NEGATIVE
LYMPHOCYTES # BLD AUTO: 3.28 THOUSANDS/ÂΜL (ref 0.6–4.47)
LYMPHOCYTES NFR BLD AUTO: 33 % (ref 14–44)
MCH RBC QN AUTO: 29.1 PG (ref 26.8–34.3)
MCHC RBC AUTO-ENTMCNC: 33.2 G/DL (ref 31.4–37.4)
MCV RBC AUTO: 88 FL (ref 82–98)
MONOCYTES # BLD AUTO: 0.79 THOUSAND/ÂΜL (ref 0.17–1.22)
MONOCYTES NFR BLD AUTO: 8 % (ref 4–12)
NEUTROPHILS # BLD AUTO: 5.5 THOUSANDS/ÂΜL (ref 1.85–7.62)
NEUTS SEG NFR BLD AUTO: 54 % (ref 43–75)
NITRITE UR QL STRIP: NEGATIVE
NRBC BLD AUTO-RTO: 0 /100 WBCS
PH UR STRIP.AUTO: 6 [PH]
PLATELET # BLD AUTO: 407 THOUSANDS/UL (ref 149–390)
PMV BLD AUTO: 10.3 FL (ref 8.9–12.7)
POTASSIUM SERPL-SCNC: 3.7 MMOL/L (ref 3.5–5.3)
PROT SERPL-MCNC: 7.5 G/DL (ref 6.4–8.4)
PROT UR STRIP-MCNC: NEGATIVE MG/DL
RBC # BLD AUTO: 4.61 MILLION/UL (ref 3.81–5.12)
SODIUM SERPL-SCNC: 135 MMOL/L (ref 135–147)
SP GR UR STRIP.AUTO: 1.02 (ref 1–1.03)
UROBILINOGEN UR QL STRIP.AUTO: 0.2 E.U./DL
WBC # BLD AUTO: 10.04 THOUSAND/UL (ref 4.31–10.16)

## 2023-05-28 RX ORDER — HYDROMORPHONE HCL/PF 1 MG/ML
0.5 SYRINGE (ML) INJECTION ONCE
Status: COMPLETED | OUTPATIENT
Start: 2023-05-28 | End: 2023-05-28

## 2023-05-28 RX ORDER — MORPHINE SULFATE 4 MG/ML
4 INJECTION, SOLUTION INTRAMUSCULAR; INTRAVENOUS ONCE
Status: COMPLETED | OUTPATIENT
Start: 2023-05-28 | End: 2023-05-28

## 2023-05-28 RX ORDER — ONDANSETRON 2 MG/ML
4 INJECTION INTRAMUSCULAR; INTRAVENOUS ONCE
Status: COMPLETED | OUTPATIENT
Start: 2023-05-28 | End: 2023-05-28

## 2023-05-28 RX ORDER — KETOROLAC TROMETHAMINE 30 MG/ML
30 INJECTION, SOLUTION INTRAMUSCULAR; INTRAVENOUS ONCE
Status: COMPLETED | OUTPATIENT
Start: 2023-05-28 | End: 2023-05-28

## 2023-05-28 RX ADMIN — HYDROMORPHONE HYDROCHLORIDE 0.5 MG: 1 INJECTION, SOLUTION INTRAMUSCULAR; INTRAVENOUS; SUBCUTANEOUS at 22:38

## 2023-05-28 RX ADMIN — KETOROLAC TROMETHAMINE 30 MG: 30 INJECTION, SOLUTION INTRAMUSCULAR at 21:58

## 2023-05-28 RX ADMIN — ONDANSETRON 4 MG: 2 INJECTION INTRAMUSCULAR; INTRAVENOUS at 21:58

## 2023-05-28 RX ADMIN — SODIUM CHLORIDE 1000 ML: 0.9 INJECTION, SOLUTION INTRAVENOUS at 21:59

## 2023-05-28 RX ADMIN — MORPHINE SULFATE 4 MG: 4 INJECTION, SOLUTION INTRAMUSCULAR; INTRAVENOUS at 22:13

## 2023-05-29 ENCOUNTER — APPOINTMENT (OUTPATIENT)
Dept: ULTRASOUND IMAGING | Facility: HOSPITAL | Age: 23
End: 2023-05-29

## 2023-05-29 ENCOUNTER — ANESTHESIA EVENT (OUTPATIENT)
Dept: PERIOP | Facility: HOSPITAL | Age: 23
End: 2023-05-29

## 2023-05-29 ENCOUNTER — APPOINTMENT (OUTPATIENT)
Dept: RADIOLOGY | Facility: HOSPITAL | Age: 23
End: 2023-05-29

## 2023-05-29 ENCOUNTER — HOSPITAL ENCOUNTER (OUTPATIENT)
Facility: HOSPITAL | Age: 23
Setting detail: OUTPATIENT SURGERY
Discharge: HOME | End: 2023-05-29
Attending: UROLOGY | Admitting: UROLOGY

## 2023-05-29 ENCOUNTER — ANESTHESIA (OUTPATIENT)
Dept: PERIOP | Facility: HOSPITAL | Age: 23
End: 2023-05-29

## 2023-05-29 VITALS
OXYGEN SATURATION: 97 % | TEMPERATURE: 97.9 F | RESPIRATION RATE: 17 BRPM | SYSTOLIC BLOOD PRESSURE: 125 MMHG | HEART RATE: 72 BPM | DIASTOLIC BLOOD PRESSURE: 73 MMHG

## 2023-05-29 VITALS
HEART RATE: 68 BPM | DIASTOLIC BLOOD PRESSURE: 57 MMHG | RESPIRATION RATE: 20 BRPM | SYSTOLIC BLOOD PRESSURE: 118 MMHG | OXYGEN SATURATION: 96 % | TEMPERATURE: 97.7 F

## 2023-05-29 DIAGNOSIS — N20.1 URETEROLITHIASIS: ICD-10-CM

## 2023-05-29 DIAGNOSIS — N20.0 RECURRENT NEPHROLITHIASIS: Primary | ICD-10-CM

## 2023-05-29 PROBLEM — F41.9 ANXIETY: Status: ACTIVE | Noted: 2023-05-29

## 2023-05-29 PROBLEM — E87.6 HYPOKALEMIA: Status: ACTIVE | Noted: 2023-05-29

## 2023-05-29 LAB
ANION GAP SERPL CALCULATED.3IONS-SCNC: 8 MMOL/L (ref 4–13)
BASOPHILS # BLD AUTO: 0.06 THOUSANDS/ÂΜL (ref 0–0.1)
BASOPHILS NFR BLD AUTO: 1 % (ref 0–1)
BUN SERPL-MCNC: 14 MG/DL (ref 5–25)
CALCIUM PRE 500 MG CA PO UR-SCNC: 24.1 MG/DL
CALCIUM SERPL-MCNC: 8.7 MG/DL (ref 8.4–10.2)
CHLORIDE SERPL-SCNC: 105 MMOL/L (ref 96–108)
CO2 SERPL-SCNC: 24 MMOL/L (ref 21–32)
CREAT SERPL-MCNC: 0.8 MG/DL (ref 0.6–1.3)
EOSINOPHIL # BLD AUTO: 0.36 THOUSAND/ÂΜL (ref 0–0.61)
EOSINOPHIL NFR BLD AUTO: 4 % (ref 0–6)
ERYTHROCYTE [DISTWIDTH] IN BLOOD BY AUTOMATED COUNT: 12.2 % (ref 11.6–15.1)
GFR SERPL CREATININE-BSD FRML MDRD: 106 ML/MIN/1.73SQ M
GLUCOSE SERPL-MCNC: 98 MG/DL (ref 65–140)
HCT VFR BLD AUTO: 34.6 % (ref 34.8–46.1)
HGB BLD-MCNC: 11.5 G/DL (ref 11.5–15.4)
IMM GRANULOCYTES # BLD AUTO: 0.03 THOUSAND/UL (ref 0–0.2)
IMM GRANULOCYTES NFR BLD AUTO: 0 % (ref 0–2)
LYMPHOCYTES # BLD AUTO: 2.65 THOUSANDS/ÂΜL (ref 0.6–4.47)
LYMPHOCYTES NFR BLD AUTO: 27 % (ref 14–44)
MCH RBC QN AUTO: 28.9 PG (ref 26.8–34.3)
MCHC RBC AUTO-ENTMCNC: 33.2 G/DL (ref 31.4–37.4)
MCV RBC AUTO: 87 FL (ref 82–98)
MONOCYTES # BLD AUTO: 0.9 THOUSAND/ÂΜL (ref 0.17–1.22)
MONOCYTES NFR BLD AUTO: 9 % (ref 4–12)
NEUTROPHILS # BLD AUTO: 5.87 THOUSANDS/ÂΜL (ref 1.85–7.62)
NEUTS SEG NFR BLD AUTO: 59 % (ref 43–75)
NRBC BLD AUTO-RTO: 0 /100 WBCS
PLATELET # BLD AUTO: 312 THOUSANDS/UL (ref 149–390)
PMV BLD AUTO: 10.7 FL (ref 8.9–12.7)
POTASSIUM SERPL-SCNC: 3.2 MMOL/L (ref 3.5–5.3)
PTH-INTACT SERPL-MCNC: 45.8 PG/ML (ref 12–88)
RBC # BLD AUTO: 3.98 MILLION/UL (ref 3.81–5.12)
SODIUM SERPL-SCNC: 137 MMOL/L (ref 135–147)
WBC # BLD AUTO: 9.87 THOUSAND/UL (ref 4.31–10.16)

## 2023-05-29 DEVICE — STENT URETERAL 6 FR 26CM INLAY OPTIMA: Type: IMPLANTABLE DEVICE | Site: URETER | Status: FUNCTIONAL

## 2023-05-29 RX ORDER — LIDOCAINE 50 MG/G
1 PATCH TOPICAL DAILY
Status: CANCELLED | OUTPATIENT
Start: 2023-05-30

## 2023-05-29 RX ORDER — ALBUTEROL SULFATE 90 UG/1
AEROSOL, METERED RESPIRATORY (INHALATION) AS NEEDED
Status: DISCONTINUED | OUTPATIENT
Start: 2023-05-29 | End: 2023-05-29

## 2023-05-29 RX ORDER — PANTOPRAZOLE SODIUM 40 MG/1
40 TABLET, DELAYED RELEASE ORAL
Status: DISCONTINUED | OUTPATIENT
Start: 2023-05-29 | End: 2023-05-29 | Stop reason: HOSPADM

## 2023-05-29 RX ORDER — MIDAZOLAM HYDROCHLORIDE 2 MG/2ML
INJECTION, SOLUTION INTRAMUSCULAR; INTRAVENOUS AS NEEDED
Status: DISCONTINUED | OUTPATIENT
Start: 2023-05-29 | End: 2023-05-29

## 2023-05-29 RX ORDER — ALBUTEROL SULFATE 90 UG/1
2 AEROSOL, METERED RESPIRATORY (INHALATION) EVERY 6 HOURS PRN
Status: CANCELLED | OUTPATIENT
Start: 2023-05-29

## 2023-05-29 RX ORDER — ACETAMINOPHEN 325 MG/1
650 TABLET ORAL EVERY 6 HOURS PRN
Status: CANCELLED | OUTPATIENT
Start: 2023-05-29

## 2023-05-29 RX ORDER — CEFAZOLIN SODIUM 2 G/50ML
2000 SOLUTION INTRAVENOUS ONCE
Status: DISCONTINUED | OUTPATIENT
Start: 2023-05-29 | End: 2023-05-29 | Stop reason: HOSPADM

## 2023-05-29 RX ORDER — BUDESONIDE AND FORMOTEROL FUMARATE DIHYDRATE 160; 4.5 UG/1; UG/1
2 AEROSOL RESPIRATORY (INHALATION) 2 TIMES DAILY
Status: DISCONTINUED | OUTPATIENT
Start: 2023-05-29 | End: 2023-05-29 | Stop reason: HOSPADM

## 2023-05-29 RX ORDER — PHENAZOPYRIDINE HYDROCHLORIDE 100 MG/1
200 TABLET, FILM COATED ORAL ONCE
Status: COMPLETED | OUTPATIENT
Start: 2023-05-29 | End: 2023-05-29

## 2023-05-29 RX ORDER — LIDOCAINE 50 MG/G
1 PATCH TOPICAL DAILY
Status: DISCONTINUED | OUTPATIENT
Start: 2023-05-29 | End: 2023-05-29 | Stop reason: HOSPADM

## 2023-05-29 RX ORDER — HYDROMORPHONE HCL/PF 1 MG/ML
0.5 SYRINGE (ML) INJECTION
Status: DISCONTINUED | OUTPATIENT
Start: 2023-05-29 | End: 2023-05-29 | Stop reason: HOSPADM

## 2023-05-29 RX ORDER — ONDANSETRON 2 MG/ML
INJECTION INTRAMUSCULAR; INTRAVENOUS AS NEEDED
Status: DISCONTINUED | OUTPATIENT
Start: 2023-05-29 | End: 2023-05-29

## 2023-05-29 RX ORDER — SODIUM CHLORIDE, SODIUM LACTATE, POTASSIUM CHLORIDE, CALCIUM CHLORIDE 600; 310; 30; 20 MG/100ML; MG/100ML; MG/100ML; MG/100ML
INJECTION, SOLUTION INTRAVENOUS CONTINUOUS PRN
Status: DISCONTINUED | OUTPATIENT
Start: 2023-05-29 | End: 2023-05-29

## 2023-05-29 RX ORDER — ONDANSETRON 2 MG/ML
4 INJECTION INTRAMUSCULAR; INTRAVENOUS EVERY 6 HOURS PRN
Status: CANCELLED | OUTPATIENT
Start: 2023-05-29

## 2023-05-29 RX ORDER — ALBUTEROL SULFATE 90 UG/1
2 AEROSOL, METERED RESPIRATORY (INHALATION) EVERY 6 HOURS PRN
Status: DISCONTINUED | OUTPATIENT
Start: 2023-05-29 | End: 2023-05-29 | Stop reason: HOSPADM

## 2023-05-29 RX ORDER — HYDROMORPHONE HCL IN WATER/PF 6 MG/30 ML
0.2 PATIENT CONTROLLED ANALGESIA SYRINGE INTRAVENOUS EVERY 4 HOURS PRN
Status: DISCONTINUED | OUTPATIENT
Start: 2023-05-29 | End: 2023-05-29 | Stop reason: HOSPADM

## 2023-05-29 RX ORDER — KETOROLAC TROMETHAMINE 30 MG/ML
30 INJECTION, SOLUTION INTRAMUSCULAR; INTRAVENOUS EVERY 6 HOURS SCHEDULED
Status: CANCELLED | OUTPATIENT
Start: 2023-05-29 | End: 2023-06-02

## 2023-05-29 RX ORDER — DEXAMETHASONE SODIUM PHOSPHATE 10 MG/ML
INJECTION, SOLUTION INTRAMUSCULAR; INTRAVENOUS AS NEEDED
Status: DISCONTINUED | OUTPATIENT
Start: 2023-05-29 | End: 2023-05-29

## 2023-05-29 RX ORDER — MAGNESIUM HYDROXIDE 1200 MG/15ML
LIQUID ORAL AS NEEDED
Status: DISCONTINUED | OUTPATIENT
Start: 2023-05-29 | End: 2023-05-29 | Stop reason: HOSPADM

## 2023-05-29 RX ORDER — KETOROLAC TROMETHAMINE 10 MG/1
10 TABLET, FILM COATED ORAL EVERY 6 HOURS PRN
Qty: 20 TABLET | Refills: 0 | Status: SHIPPED | OUTPATIENT
Start: 2023-05-29 | End: 2023-06-03

## 2023-05-29 RX ORDER — OXYCODONE HYDROCHLORIDE 5 MG/1
5 TABLET ORAL EVERY 6 HOURS PRN
Qty: 10 TABLET | Refills: 0 | Status: SHIPPED | OUTPATIENT
Start: 2023-05-29

## 2023-05-29 RX ORDER — CEFAZOLIN SODIUM 1 G/3ML
INJECTION, POWDER, FOR SOLUTION INTRAMUSCULAR; INTRAVENOUS AS NEEDED
Status: DISCONTINUED | OUTPATIENT
Start: 2023-05-29 | End: 2023-05-29

## 2023-05-29 RX ORDER — CEPHALEXIN 500 MG/1
500 CAPSULE ORAL EVERY 6 HOURS SCHEDULED
Qty: 12 CAPSULE | Refills: 0 | Status: SHIPPED | OUTPATIENT
Start: 2023-05-29 | End: 2023-06-01

## 2023-05-29 RX ORDER — FENTANYL CITRATE/PF 50 MCG/ML
50 SYRINGE (ML) INJECTION
Status: DISCONTINUED | OUTPATIENT
Start: 2023-05-29 | End: 2023-05-29 | Stop reason: HOSPADM

## 2023-05-29 RX ORDER — HYDROMORPHONE HCL IN WATER/PF 6 MG/30 ML
0.2 PATIENT CONTROLLED ANALGESIA SYRINGE INTRAVENOUS EVERY 4 HOURS PRN
Status: CANCELLED | OUTPATIENT
Start: 2023-05-29

## 2023-05-29 RX ORDER — OXYCODONE HYDROCHLORIDE 5 MG/1
5 TABLET ORAL EVERY 4 HOURS PRN
Status: DISCONTINUED | OUTPATIENT
Start: 2023-05-29 | End: 2023-05-29 | Stop reason: HOSPADM

## 2023-05-29 RX ORDER — LIDOCAINE HYDROCHLORIDE 10 MG/ML
INJECTION, SOLUTION EPIDURAL; INFILTRATION; INTRACAUDAL; PERINEURAL AS NEEDED
Status: DISCONTINUED | OUTPATIENT
Start: 2023-05-29 | End: 2023-05-29

## 2023-05-29 RX ORDER — ONDANSETRON 2 MG/ML
4 INJECTION INTRAMUSCULAR; INTRAVENOUS EVERY 6 HOURS PRN
Status: DISCONTINUED | OUTPATIENT
Start: 2023-05-29 | End: 2023-05-29 | Stop reason: HOSPADM

## 2023-05-29 RX ORDER — TAMSULOSIN HYDROCHLORIDE 0.4 MG/1
0.4 CAPSULE ORAL
Status: CANCELLED | OUTPATIENT
Start: 2023-05-29

## 2023-05-29 RX ORDER — OXYBUTYNIN CHLORIDE 5 MG/1
5 TABLET ORAL 3 TIMES DAILY PRN
Qty: 20 TABLET | Refills: 0 | Status: SHIPPED | OUTPATIENT
Start: 2023-05-29

## 2023-05-29 RX ORDER — ONDANSETRON 2 MG/ML
4 INJECTION INTRAMUSCULAR; INTRAVENOUS ONCE AS NEEDED
Status: DISCONTINUED | OUTPATIENT
Start: 2023-05-29 | End: 2023-05-29 | Stop reason: HOSPADM

## 2023-05-29 RX ORDER — PHENAZOPYRIDINE HYDROCHLORIDE 200 MG/1
200 TABLET, FILM COATED ORAL 3 TIMES DAILY PRN
Qty: 30 TABLET | Refills: 0 | Status: SHIPPED | OUTPATIENT
Start: 2023-05-29

## 2023-05-29 RX ORDER — POTASSIUM CHLORIDE 20 MEQ/1
40 TABLET, EXTENDED RELEASE ORAL 2 TIMES DAILY
Status: DISCONTINUED | OUTPATIENT
Start: 2023-05-29 | End: 2023-05-29 | Stop reason: HOSPADM

## 2023-05-29 RX ORDER — KETOROLAC TROMETHAMINE 30 MG/ML
INJECTION, SOLUTION INTRAMUSCULAR; INTRAVENOUS AS NEEDED
Status: DISCONTINUED | OUTPATIENT
Start: 2023-05-29 | End: 2023-05-29

## 2023-05-29 RX ORDER — TAMSULOSIN HYDROCHLORIDE 0.4 MG/1
0.4 CAPSULE ORAL
Status: DISCONTINUED | OUTPATIENT
Start: 2023-05-29 | End: 2023-05-29 | Stop reason: HOSPADM

## 2023-05-29 RX ORDER — PANTOPRAZOLE SODIUM 40 MG/1
40 TABLET, DELAYED RELEASE ORAL
Status: CANCELLED | OUTPATIENT
Start: 2023-05-30

## 2023-05-29 RX ORDER — KETOROLAC TROMETHAMINE 30 MG/ML
30 INJECTION, SOLUTION INTRAMUSCULAR; INTRAVENOUS EVERY 6 HOURS SCHEDULED
Status: DISCONTINUED | OUTPATIENT
Start: 2023-05-29 | End: 2023-05-29 | Stop reason: HOSPADM

## 2023-05-29 RX ORDER — POTASSIUM CHLORIDE 20 MEQ/1
40 TABLET, EXTENDED RELEASE ORAL 2 TIMES DAILY
Status: CANCELLED | OUTPATIENT
Start: 2023-05-29 | End: 2023-05-30

## 2023-05-29 RX ORDER — ACETAMINOPHEN 325 MG/1
650 TABLET ORAL EVERY 6 HOURS PRN
Status: DISCONTINUED | OUTPATIENT
Start: 2023-05-29 | End: 2023-05-29 | Stop reason: HOSPADM

## 2023-05-29 RX ORDER — BUSPIRONE HYDROCHLORIDE 5 MG/1
7.5 TABLET ORAL 2 TIMES DAILY
Status: DISCONTINUED | OUTPATIENT
Start: 2023-05-29 | End: 2023-05-29 | Stop reason: HOSPADM

## 2023-05-29 RX ORDER — OXYBUTYNIN CHLORIDE 5 MG/1
5 TABLET ORAL 3 TIMES DAILY PRN
Status: DISCONTINUED | OUTPATIENT
Start: 2023-05-29 | End: 2023-05-29 | Stop reason: HOSPADM

## 2023-05-29 RX ORDER — PROPOFOL 10 MG/ML
INJECTION, EMULSION INTRAVENOUS AS NEEDED
Status: DISCONTINUED | OUTPATIENT
Start: 2023-05-29 | End: 2023-05-29

## 2023-05-29 RX ORDER — BUSPIRONE HYDROCHLORIDE 5 MG/1
7.5 TABLET ORAL 2 TIMES DAILY
Status: CANCELLED | OUTPATIENT
Start: 2023-05-29

## 2023-05-29 RX ORDER — BUDESONIDE AND FORMOTEROL FUMARATE DIHYDRATE 160; 4.5 UG/1; UG/1
2 AEROSOL RESPIRATORY (INHALATION) 2 TIMES DAILY
Status: CANCELLED | OUTPATIENT
Start: 2023-05-29

## 2023-05-29 RX ORDER — FENTANYL CITRATE 50 UG/ML
INJECTION, SOLUTION INTRAMUSCULAR; INTRAVENOUS AS NEEDED
Status: DISCONTINUED | OUTPATIENT
Start: 2023-05-29 | End: 2023-05-29

## 2023-05-29 RX ADMIN — PHENAZOPYRIDINE 200 MG: 100 TABLET ORAL at 17:52

## 2023-05-29 RX ADMIN — HYDROMORPHONE HYDROCHLORIDE 0.2 MG: 0.2 INJECTION, SOLUTION INTRAMUSCULAR; INTRAVENOUS; SUBCUTANEOUS at 00:56

## 2023-05-29 RX ADMIN — ALBUTEROL SULFATE 2 PUFF: 90 AEROSOL, METERED RESPIRATORY (INHALATION) at 16:03

## 2023-05-29 RX ADMIN — ONDANSETRON 4 MG: 2 INJECTION INTRAMUSCULAR; INTRAVENOUS at 03:16

## 2023-05-29 RX ADMIN — ALBUTEROL SULFATE 2 PUFF: 90 AEROSOL, METERED RESPIRATORY (INHALATION) at 15:58

## 2023-05-29 RX ADMIN — TAMSULOSIN HYDROCHLORIDE 0.4 MG: 0.4 CAPSULE ORAL at 01:37

## 2023-05-29 RX ADMIN — MIDAZOLAM 2 MG: 1 INJECTION INTRAMUSCULAR; INTRAVENOUS at 15:35

## 2023-05-29 RX ADMIN — FENTANYL CITRATE 50 MCG: 50 INJECTION, SOLUTION INTRAMUSCULAR; INTRAVENOUS at 15:43

## 2023-05-29 RX ADMIN — POTASSIUM CHLORIDE 40 MEQ: 1500 TABLET, EXTENDED RELEASE ORAL at 08:34

## 2023-05-29 RX ADMIN — KETOROLAC TROMETHAMINE 30 MG: 30 INJECTION, SOLUTION INTRAMUSCULAR at 10:07

## 2023-05-29 RX ADMIN — FENTANYL CITRATE 50 MCG: 50 INJECTION, SOLUTION INTRAMUSCULAR; INTRAVENOUS at 15:46

## 2023-05-29 RX ADMIN — ONDANSETRON 4 MG: 2 INJECTION INTRAMUSCULAR; INTRAVENOUS at 15:46

## 2023-05-29 RX ADMIN — PANTOPRAZOLE SODIUM 40 MG: 40 TABLET, DELAYED RELEASE ORAL at 06:09

## 2023-05-29 RX ADMIN — MORPHINE SULFATE 2 MG: 2 INJECTION, SOLUTION INTRAMUSCULAR; INTRAVENOUS at 03:17

## 2023-05-29 RX ADMIN — KETOROLAC TROMETHAMINE 30 MG: 30 INJECTION, SOLUTION INTRAMUSCULAR; INTRAVENOUS at 16:03

## 2023-05-29 RX ADMIN — ONDANSETRON 4 MG: 2 INJECTION INTRAMUSCULAR; INTRAVENOUS at 14:20

## 2023-05-29 RX ADMIN — CEFAZOLIN 2000 MG: 1 INJECTION, POWDER, FOR SOLUTION INTRAMUSCULAR; INTRAVENOUS at 15:40

## 2023-05-29 RX ADMIN — PROPOFOL 300 MG: 10 INJECTION, EMULSION INTRAVENOUS at 15:44

## 2023-05-29 RX ADMIN — ONDANSETRON 4 MG: 2 INJECTION INTRAMUSCULAR; INTRAVENOUS at 08:34

## 2023-05-29 RX ADMIN — ALBUTEROL SULFATE 2 PUFF: 90 AEROSOL, METERED RESPIRATORY (INHALATION) at 16:06

## 2023-05-29 RX ADMIN — DEXAMETHASONE SODIUM PHOSPHATE 10 MG: 10 INJECTION, SOLUTION INTRAMUSCULAR; INTRAVENOUS at 15:46

## 2023-05-29 RX ADMIN — LIDOCAINE HYDROCHLORIDE 50 MG: 10 INJECTION, SOLUTION EPIDURAL; INFILTRATION; INTRACAUDAL; PERINEURAL at 15:43

## 2023-05-29 RX ADMIN — BUDESONIDE AND FORMOTEROL FUMARATE DIHYDRATE 2 PUFF: 160; 4.5 AEROSOL RESPIRATORY (INHALATION) at 08:34

## 2023-05-29 RX ADMIN — SODIUM CHLORIDE, SODIUM LACTATE, POTASSIUM CHLORIDE, AND CALCIUM CHLORIDE: .6; .31; .03; .02 INJECTION, SOLUTION INTRAVENOUS at 14:20

## 2023-05-29 RX ADMIN — LIDOCAINE 1 PATCH: 50 PATCH CUTANEOUS at 08:36

## 2023-05-29 RX ADMIN — BUSPIRONE HYDROCHLORIDE 7.5 MG: 5 TABLET ORAL at 08:34

## 2023-05-29 RX ADMIN — HYDROMORPHONE HYDROCHLORIDE 0.2 MG: 0.2 INJECTION, SOLUTION INTRAMUSCULAR; INTRAVENOUS; SUBCUTANEOUS at 10:43

## 2023-05-29 NOTE — ED NOTES
Patients father provided with reclining chair to sleep in for the night   Drink provided     Jonny Verde, FLAQUITO  05/29/23 0157

## 2023-05-29 NOTE — ASSESSMENT & PLAN NOTE
Likely secondary to 5 mm distal right ureteral calculus above the UVJ with moderate right hydroureteronephrosis noted on CT on 5/23/2023  · Urine culture on 5/23/2023 showed <10,000 GBS, patient has been on Keflex but UA today is unremarkable, no further antibiotics  · Urinalysis today is unremarkable including no trace or occult blood notable, possible that patient may have passed stone though she did not note this  · Will continue Flomax 0 4 mg daily and strain all urine, stone analysis ordered if stone collected  · No signs of active infection, sepsis  · Patient is being admitted for pain management due to minimal resolution with IV Toradol 30 mg x 1, IV morphine 4 mg x 1, IV Dilaudid 0 5 mg x 1  · ER physician reports case was discussed with on-call urologist who recommended ultrasound to assess for hydronephrosis and if present patient can be transferred for stenting but otherwise no intervention indicated  · Ultrasound ordered  · N p o  if surgical intervention needed patient is not on anticoagulation  · Pt is otherwise hemodynamically stable without any signs or symptoms of pyelonephritis, sepsis, cystitis  · Pain management-IV morphine 2 mg every 6 hours as needed, IV Dilaudid 0 2 mg every 4 hours as needed for breakthrough, lidocaine 5% patch daily

## 2023-05-29 NOTE — ED NOTES
Reached out to Dr Carole Myers via  Yumi Aguila in regards to EMTALA, which was not completed prior to EMS transport with patient   EMS informed to have receiving facility contact us if EMTALA needed upon arrival      Dea Ramírez RN  05/29/23 [id number]

## 2023-05-29 NOTE — INCIDENTAL FINDINGS
The following findings require follow up:  Radiographic finding   Finding:   · KUB US: 1 9 cm left renal cyst with a thin internal septation   Sonographic follow-up should be obtained in 1 year interval      Follow up required: yes   Follow up should be done within 12 month(s)    Please notify the following clinician to assist with the follow up: PCP

## 2023-05-29 NOTE — ASSESSMENT & PLAN NOTE
On US KUB: 1 9 cm left renal cyst with a thin internal septation     Sonographic follow-up should be obtained in 1 year interval

## 2023-05-29 NOTE — DISCHARGE INSTR - AVS FIRST PAGE
Expect to see blood in the urine, and to experience urgency/frequency/burning with urination and dribbling  This is normal after urological procedures  Is also normal to experience some nausea after these procedures  Go back your regular diet carefully  It is normal to feel pain in the kidney when urinating and when the bladder is filling due to urine refluxing up to the kidney because of the open stent  The stent is necessary to keep the ureter open to allow clots and swelling to resolve and to allow the kidney to drain properly after instrumentation  Some stones may pass around the stent, but most stones pass after the stent is removed  The presence of the stent makes the ureter wider while it is in and after has been removed, allowing passage of larger fragments  Call for fever greater that 101 5, inability to urinate, prolonged nausea and vomiting, or severe pain not relieved by pain medications  Arabella Martin Keep stent string taped- if it becomes dislodged or gets pulled out early, that is OK- simply remove it completely by pulling on string until it is completely out  No driving/operating machinery for 24 hours, and while taking narcotics  Take over the counter remedy of choice to avoid constipation  Drink plenty of fluids  I have made an ambulatory referral to nephrology to help find out why you keep forming the stones and how to prevent them  Our office will call you to set this up as well as set up a nurse visit next week to remove stent by pulling on the string  Then see us in 6 months with a renal ultrasound

## 2023-05-29 NOTE — ASSESSMENT & PLAN NOTE
Continue home Prilosec 40 mg daily No significant abnormality on  imited esophagogram. Await formal swallow study

## 2023-05-29 NOTE — ASSESSMENT & PLAN NOTE
Likely secondary to 5 mm distal right ureteral calculus above the UVJ with moderate right hydroureteronephrosis noted on CT on 5/23/2023  · Patient was on keflex for UTI prior to admission  UA on admission is unremarkable  Monitor off of antibiotics  · Continue Flomax 0 4 mg daily and strain all urine, stone analysis ordered if stone collected  · Pain management-IV morphine 2 mg every 6 hours as needed, IV Dilaudid 0 2 mg every 4 hours as needed for breakthrough, lidocaine 5% patch daily  · On-call Urology consulted - appreciate recommendations  · US KUB: Right hydronephrosis with 5 mm calculus in the distal right ureter  · Transferring the patient to [facility] for ureteral stenting and lithotripsy

## 2023-05-29 NOTE — ANESTHESIA POSTPROCEDURE EVALUATION
Post-Op Assessment Note    CV Status:  Stable  Pain Score: 0    Pain management: adequate     Mental Status:  Alert and awake   Hydration Status:  Euvolemic   PONV Controlled:  Controlled   Airway Patency:  Patent      Post Op Vitals Reviewed: Yes      Staff: Anesthesiologist, CRNA         No notable events documented      BP   117/51   Temp      Pulse  96   Resp 12   SpO2   95

## 2023-05-29 NOTE — EMTALA/ACUTE CARE TRANSFER
[facility] EMERGENCY DEPARTMENT  [address] [place] [id number]  Dept: [phone]      EMTALA TRANSFER CONSENT    NAME Damian Aguero                                         2000                              MRN [id number]    I have been informed of my rights regarding examination, treatment, and transfer   by Dr Marycruz Garcia MD    Benefits:  For a cystoscopy/ureteroscopy/lithotripsy/ureteral stent placement per Urology    Risks:  Ambulance transportation risks      Consent for Transfer:  I acknowledge that my medical condition has been evaluated and explained to me by the emergency department physician or other qualified medical person and/or my attending physician, who has recommended that I be transferred to the service of Dr Eddie Bean (Urology) at [address] [place] [address]  The above potential benefits of such transfer, the potential risks associated with such transfer, and the probable risks of not being transferred have been explained to me, and I fully understand them  The doctor has explained that, in my case, the benefits of transfer outweigh the risks  I agree to be transferred  I authorize the performance of emergency medical procedures and treatments upon me in both transit and upon arrival at the receiving facility  Additionally, I authorize the release of any and all medical records to the receiving facility and request they be transported with me, if possible  I understand that the safest mode of transportation during a medical emergency is an ambulance and that the Hospital advocates the use of this mode of transport  Risks of traveling to the receiving facility by car, including absence of medical control, life sustaining equipment, such as oxygen, and medical personnel has been explained to me and I fully understand them      (EVERARDO CORRECT BOX BELOW)  [  ]  I consent to the stated transfer and to be transported by ambulance/helicopter  [  ]  I consent to the stated transfer, but refuse transportation by ambulance and accept full responsibility for my transportation by car  I understand the risks of non-ambulance transfers and I exonerate the Hospital and its staff from any deterioration in my condition that results from this refusal     X___________________________________________    DATE  23  TIME________  Signature of patient or legally responsible individual signing on patient behalf           RELATIONSHIP TO PATIENT_________________________          Provider Certification    NAME Jorge Carlton                                         2000                              MRN [id number]    A medical screening exam was performed on the above named patient  Based on the examination:    Condition Necessitating Transfer The primary encounter diagnosis was Right flank pain  Diagnoses of At risk for inadequate pain control and Ureterolithiasis were also pertinent to this visit  Patient Condition:  Hemodynamically stable    Reason for Transfer: For a cystoscopy/ureteroscopy/lithotripsy/ureteral stent placement per Urology    Transfer Requirements: Facility-  [facility]   · Space available and qualified personnel available for treatment as acknowledged by    · Agreed to accept transfer and to provide appropriate medical treatment as acknowledged by          · Appropriate medical records of the examination and treatment of the patient are provided at the time of transfer   [facility], [address] _______  · Transfer will be performed by qualified personnel from    and appropriate transfer equipment as required, including the use of necessary and appropriate life support measures      Provider Certification: I have examined the patient and explained the following risks and benefits of being transferred/refusing transfer to the patient/family:         Based on these reasonable risks and benefits to the patient and/or the unborn child(jossie), and based upon the information available at the time of the patient’s examination, I certify that the medical benefits reasonably to be expected from the provision of appropriate medical treatments at another medical facility outweigh the increasing risks, if any, to the individual’s medical condition, and in the case of labor to the unborn child, from effecting the transfer      X____________________________________________ DATE 05/29/23        TIME_______      ORIGINAL - SEND TO MEDICAL RECORDS   COPY - SEND WITH PATIENT DURING TRANSFER

## 2023-05-29 NOTE — H&P
H&P Exam - Urology   Lorena Spaulding 2000 [id number]      Assessment/Plan     Assessment:  5 mm right UVJ stone, unable to pass  Plan:  Cystoscopy, right ureteroscopy laser lithotripsy/stone basket extraction and right ureteral stent placement  History of Present Illness   HPI: 82-year-old presenting in transfer from [address] [place] [address] for a 5 mm UVJ stone on the right  This is her second ER visit, and it shows no sign of passing  She has several smaller kidney stones 2 to 4 mm bilaterally  She has not had stone work-up  This has been going on for several years  Never had stone surgery  She is afebrile  She is not pregnant  White blood count is normal so is her creatinine  She has been offered cystoscopy right ureteroscopy stone basket extraction laser lithotripsy and right ureteral stent placement upon arrival the procedure as well as The risks of bleeding, infection, damage to the urinary tract and need for additional procedures has been explained and informed consent given  Past Medical History:   Diagnosis Date   • Exercise-induced asthma    • Kidney stone    • Ovarian cyst        Past Surgical History:   Procedure Laterality Date   • APPENDECTOMY     • UPPER GASTROINTESTINAL ENDOSCOPY     • WISDOM TOOTH EXTRACTION     • WISDOM TOOTH EXTRACTION           Review of systems:    General: No fever  CVS: No chest pain or new SOB  Abdomen: No diarrhea or blood in stool  : No new voiding difficulties  Neuro: No syncope/weakness/loss of sensation/paresis  Ophthalmologic: No new visual changes  Ortho: No new back/joint pains  Social History- as per previous notes  Family History: non-contributory    Meds/Allergies   PTA meds:   Prior to Admission Medications   Prescriptions Last Dose Informant Patient Reported? Taking?    Multiple Vitamin (Multi-Day) TABS  Self Yes No   Sig: Take 1 tablet by mouth   Probiotic Product (Misc Intestinal Vikki Regulat) CAPS  Self Yes No   Sig: Take by mouth   Symbicort 160-4 5 MCG/ACT inhaler   No No   Sig: Inhale 2 puffs 2 (two) times a day Rinse mouth after use     Zofran ODT 4 MG disintegrating tablet  Self No No   Sig: Take 1 tablet (4 mg total) by mouth every 6 (six) hours as needed for nausea or vomiting   albuterol (PROVENTIL HFA,VENTOLIN HFA) 90 mcg/act inhaler  Self No No   Sig: Inhale 2 puffs every 6 (six) hours as needed for wheezing   ascorbic acid (VITAMIN C) 500 MG tablet  Self Yes No   Sig: Take 500 mg by mouth   bisacodyl (DULCOLAX) 5 mg EC tablet   No No   Sig: Take 2 tablets (10 mg total) by mouth in the morning for 14 doses Colonoscopy prep   busPIRone (BUSPAR) 7 5 mg tablet   Yes No   Sig: Take 7 5 mg by mouth 2 (two) times a day   cephalexin (KEFLEX) 500 mg capsule   No No   Sig: Take 1 capsule (500 mg total) by mouth every 6 (six) hours for 5 days   fluticasone (FLONASE) 50 mcg/act nasal spray  Self No No   Si spray into each nostril daily   naproxen (Naprosyn) 500 mg tablet   No No   Sig: Take 1 tablet (500 mg total) by mouth 2 (two) times a day with meals for 5 days   norethindrone-ethinyl estradiol (Junel ) 1-20 MG-MCG per tablet   No No   Sig: Take 1 tablet by mouth daily   omeprazole (PriLOSEC) 40 MG capsule   No No   Sig: Take 1 capsule (40 mg total) by mouth daily   ondansetron (Zofran ODT) 4 mg disintegrating tablet   No No   Sig: Take 1 tablet (4 mg total) by mouth every 6 (six) hours as needed for nausea or vomiting   oxyCODONE (Roxicodone) 5 immediate release tablet   No No   Sig: Take 1 tablet (5 mg total) by mouth every 6 (six) hours as needed for moderate pain for up to 11 doses Max Daily Amount: 20 mg   promethazine (PHENERGAN) 12 5 MG tablet   No No   Sig: Take 1 tablet (12 5 mg total) by mouth every 6 (six) hours as needed for nausea or vomiting   sucralfate (CARAFATE) 1 g tablet  Self No No   Sig: Take 1 tablet (1 g total) by mouth 4 (four) times a day for 12 doses   tamsulosin (FLOMAX) 0 4 mg   No No   Sig: Take 1 capsule (0 4 mg total) by mouth daily with dinner for 3 days      Facility-Administered Medications: None         Objective   Vitals: There were no vitals taken for this visit  No intake/output data recorded  Physical Exam:    Awake, alert, in NAD  HEENT: Atraumatic, Normocephalic    Lungs: Normal Respiratory effort, no wheezes,stridor or rales  CVS- RRR    Abdomen: Nondistended  Extremiites: Normal ROM, no cyanosis/edema  Lab Results:   I have personally reviewed pertinent reports  CBC:   Lab Results   Component Value Date    HCT 34 6 (L) 05/29/2023    HGB 11 5 05/29/2023    MCH 28 9 05/29/2023    MCHC 33 2 05/29/2023    MCV 87 05/29/2023    MPV 10 7 05/29/2023    NRBC 0 05/29/2023     05/29/2023    RBC 3 98 05/29/2023    RDW 12 2 05/29/2023    WBC 9 87 05/29/2023     CMP:   Lab Results   Component Value Date    ALKPHOS 63 05/28/2023    ALT 7 05/28/2023    AST 13 05/28/2023    BUN 14 05/29/2023    CALCIUM 8 7 05/29/2023     05/29/2023    CO2 24 05/29/2023    CREATININE 0 80 05/29/2023    EGFR 106 05/29/2023    SODIUM 137 05/29/2023     Urinalysis:   Lab Results   Component Value Date    BILIRUBINUR Negative 05/28/2023    BLOODU Negative 05/28/2023    CLARITYU Clear 05/28/2023    COLORU Yellow 05/28/2023    GLUCOSEU Negative 05/28/2023    KETONESU Negative 05/28/2023    LEUKOCYTESUR Negative 05/28/2023    NITRITE Negative 05/28/2023    PHUR 6 0 05/28/2023    SPECGRAV 1 025 05/28/2023     Urine Culture:   Lab Results   Component Value Date    URINECX <10,000 cfu/ml 10/30/2023     Imaging: I have personally reviewed pertinent reports     and I have personally reviewed pertinent films in PACS

## 2023-05-29 NOTE — ED PROVIDER NOTES
History  Chief Complaint   Patient presents with   • Medical Problem - Re-Evaluation     Seen here 5/23  Dx with Right Kidney Stone  Sent home on flomax, hydrocodone  States today pain unberabable  Took last pain pill around 7pm  Denies any urination problems, fevers  C/o of nausea and vomiting     To er with known renal stone and right flank pain  She as seen on 5/23 and ct noted stone, right distal ureter 5mm  Since then she has had constipation of her pain, pain is intense and she vomits  uti on day of renal stone dx, on abx  No uti sx, fever, chills, diarrhea  Reported to er with uncontrolled pain  Took oxy pta wo sx resolution  Pain is right flank, rad to the inguinal region  She denies her pain as moving, same location and sx as it was when she was dx with renal stone on 5/23  Denies preg  Prior to Admission Medications   Prescriptions Last Dose Informant Patient Reported? Taking? Multiple Vitamin (Multi-Day) TABS  Self Yes No   Sig: Take 1 tablet by mouth   Probiotic Product (Misc Intestinal Vikki Regulat) CAPS  Self Yes No   Sig: Take by mouth   Symbicort 160-4 5 MCG/ACT inhaler   No No   Sig: Inhale 2 puffs 2 (two) times a day Rinse mouth after use     Zofran ODT 4 MG disintegrating tablet  Self No No   Sig: Take 1 tablet (4 mg total) by mouth every 6 (six) hours as needed for nausea or vomiting   albuterol (PROVENTIL HFA,VENTOLIN HFA) 90 mcg/act inhaler  Self No No   Sig: Inhale 2 puffs every 6 (six) hours as needed for wheezing   ascorbic acid (VITAMIN C) 500 MG tablet  Self Yes No   Sig: Take 500 mg by mouth   bisacodyl (DULCOLAX) 5 mg EC tablet   No No   Sig: Take 2 tablets (10 mg total) by mouth in the morning for 14 doses Colonoscopy prep   busPIRone (BUSPAR) 7 5 mg tablet   Yes No   Sig: Take 7 5 mg by mouth 2 (two) times a day   cephalexin (KEFLEX) 500 mg capsule   No No   Sig: Take 1 capsule (500 mg total) by mouth every 6 (six) hours for 5 days   fluticasone (FLONASE) 50 mcg/act nasal spray  Self No No   Si spray into each nostril daily   naproxen (Naprosyn) 500 mg tablet   No No   Sig: Take 1 tablet (500 mg total) by mouth 2 (two) times a day with meals for 5 days   norethindrone-ethinyl estradiol (Junel ) 1-20 MG-MCG per tablet   No No   Sig: Take 1 tablet by mouth daily   omeprazole (PriLOSEC) 40 MG capsule   No No   Sig: Take 1 capsule (40 mg total) by mouth daily   ondansetron (Zofran ODT) 4 mg disintegrating tablet   No No   Sig: Take 1 tablet (4 mg total) by mouth every 6 (six) hours as needed for nausea or vomiting   oxyCODONE (Roxicodone) 5 immediate release tablet   No No   Sig: Take 1 tablet (5 mg total) by mouth every 6 (six) hours as needed for moderate pain for up to 11 doses Max Daily Amount: 20 mg   promethazine (PHENERGAN) 12 5 MG tablet   No No   Sig: Take 1 tablet (12 5 mg total) by mouth every 6 (six) hours as needed for nausea or vomiting   sucralfate (CARAFATE) 1 g tablet  Self No No   Sig: Take 1 tablet (1 g total) by mouth 4 (four) times a day for 12 doses   tamsulosin (FLOMAX) 0 4 mg   No No   Sig: Take 1 capsule (0 4 mg total) by mouth daily with dinner for 3 days      Facility-Administered Medications: None       Past Medical History:   Diagnosis Date   • Exercise-induced asthma    • Kidney stone    • Ovarian cyst        Past Surgical History:   Procedure Laterality Date   • APPENDECTOMY     • UPPER GASTROINTESTINAL ENDOSCOPY     • WISDOM TOOTH EXTRACTION     • WISDOM TOOTH EXTRACTION         Family History   Problem Relation Age of Onset   • Heart disease Maternal Grandfather      I have reviewed and agree with the history as documented      E-Cigarette/Vaping   • E-Cigarette Use Never User      E-Cigarette/Vaping Substances   • Nicotine No    • THC No    • CBD No    • Flavoring No    • Other No    • Unknown No      Social History     Tobacco Use   • Smoking status: Never   • Smokeless tobacco: Never   Vaping Use   • Vaping Use: Never used   Substance Use Topics • Alcohol use: Yes     Comment: social    • Drug use: Never       Review of Systems   All other systems reviewed and are negative  Physical Exam  Physical Exam  Constitutional:       General: She is not in acute distress  Appearance: Normal appearance  She is well-developed and normal weight  She is not ill-appearing, toxic-appearing or diaphoretic  HENT:      Head: Normocephalic and atraumatic  Right Ear: External ear normal       Left Ear: External ear normal       Nose: Nose normal       Mouth/Throat:      Mouth: Mucous membranes are moist    Eyes:      General:         Right eye: No discharge  Left eye: No discharge  Pupils: Pupils are equal, round, and reactive to light  Neck:      Vascular: No JVD  Cardiovascular:      Rate and Rhythm: Normal rate and regular rhythm  Pulses: Normal pulses  Heart sounds: Normal heart sounds  No murmur heard  No friction rub  No gallop  Pulmonary:      Effort: Pulmonary effort is normal  No respiratory distress  Breath sounds: Normal breath sounds  No stridor  No wheezing, rhonchi or rales  Chest:      Chest wall: No tenderness  Abdominal:      General: Abdomen is flat  Bowel sounds are normal  There is no distension  Palpations: Abdomen is soft  There is no mass  Tenderness: There is no abdominal tenderness  There is right CVA tenderness  There is no left CVA tenderness, guarding or rebound  Hernia: No hernia is present  Musculoskeletal:         General: No swelling, tenderness, deformity or signs of injury  Normal range of motion  Cervical back: Normal range of motion and neck supple  Right lower leg: No edema  Left lower leg: No edema  Skin:     General: Skin is warm  Capillary Refill: Capillary refill takes less than 2 seconds  Coloration: Skin is not jaundiced or pale  Findings: No bruising, erythema, lesion or rash     Neurological:      General: No focal deficit present  Mental Status: She is alert and oriented to person, place, and time  Cranial Nerves: No cranial nerve deficit  Sensory: No sensory deficit  Motor: No weakness or abnormal muscle tone  Coordination: Coordination normal       Gait: Gait normal       Deep Tendon Reflexes: Reflexes normal    Psychiatric:         Mood and Affect: Mood normal          Vital Signs  ED Triage Vitals   Temperature Pulse Respirations Blood Pressure SpO2   05/28/23 2144 05/28/23 2145 05/28/23 2145 05/28/23 2145 05/28/23 2145   98 °F (36 7 °C) 92 19 140/81 98 %      Temp Source Heart Rate Source Patient Position - Orthostatic VS BP Location FiO2 (%)   05/28/23 2144 05/28/23 2145 05/29/23 0615 05/28/23 2145 --   Temporal Monitor Lying Left arm       Pain Score       05/28/23 2145       10 - Worst Possible Pain           Vitals:    05/28/23 2145 05/29/23 0615 05/29/23 0730   BP: 140/81 135/88 118/57   Pulse: 92 63 68   Patient Position - Orthostatic VS:  Lying          Visual Acuity      ED Medications  Medications   sodium chloride 0 9 % bolus 1,000 mL (0 mL Intravenous Stopped 5/29/23 0055)   ondansetron (ZOFRAN) injection 4 mg (4 mg Intravenous Given 5/28/23 2158)   ketorolac (TORADOL) injection 30 mg (30 mg Intravenous Given 5/28/23 2158)   morphine injection 4 mg (4 mg Intravenous Given 5/28/23 2213)   HYDROmorphone (DILAUDID) injection 0 5 mg (0 5 mg Intravenous Given 5/28/23 2238)       Diagnostic Studies  Results Reviewed     Procedure Component Value Units Date/Time    Urine culture [[id number]]     Lab Status: No result Specimen: Urine, Clean Catch     Calcium, urine, random [[id number]] Collected: 05/28/23 2154    Lab Status: In process Specimen: Urine Updated: 05/29/23 1105    PTH, intact [[id number]] Collected: 05/29/23 0412    Lab Status:  In process Specimen: Blood Updated: 05/29/23 1850    Basic metabolic panel [[id number]]  (Abnormal) Collected: 05/29/23 0412    Lab Status: Final result Specimen: Blood from Arm, Right Updated: 05/29/23 0513     Sodium 137 mmol/L      Potassium 3 2 mmol/L      Chloride 105 mmol/L      CO2 24 mmol/L      ANION GAP 8 mmol/L      BUN 14 mg/dL      Creatinine 0 80 mg/dL      Glucose 98 mg/dL      Calcium 8 7 mg/dL      eGFR 106 ml/min/1 73sq m     Narrative:      [facility] guidelines for Chronic Kidney Disease (CKD):   •  Stage 1 with normal or high GFR (GFR > 90 mL/min/1 73 square meters)  •  Stage 2 Mild CKD (GFR = 60-89 mL/min/1 73 square meters)  •  Stage 3A Moderate CKD (GFR = 45-59 mL/min/1 73 square meters)  •  Stage 3B Moderate CKD (GFR = 30-44 mL/min/1 73 square meters)  •  Stage 4 Severe CKD (GFR = 15-29 mL/min/1 73 square meters)  •  Stage 5 End Stage CKD (GFR <15 mL/min/1 73 square meters)  Note: GFR calculation is accurate only with a steady state creatinine    CBC and differential [[id number]]  (Abnormal) Collected: 05/29/23 0412    Lab Status: Final result Specimen: Blood from Arm, Right Updated: 05/29/23 0501     WBC 9 87 Thousand/uL      RBC 3 98 Million/uL      Hemoglobin 11 5 g/dL      Hematocrit 34 6 %      MCV 87 fL      MCH 28 9 pg      MCHC 33 2 g/dL      RDW 12 2 %      MPV 10 7 fL      Platelets 458 Thousands/uL      nRBC 0 /100 WBCs      Neutrophils Relative 59 %      Immat GRANS % 0 %      Lymphocytes Relative 27 %      Monocytes Relative 9 %      Eosinophils Relative 4 %      Basophils Relative 1 %      Neutrophils Absolute 5 87 Thousands/µL      Immature Grans Absolute 0 03 Thousand/uL      Lymphocytes Absolute 2 65 Thousands/µL      Monocytes Absolute 0 90 Thousand/µL      Eosinophils Absolute 0 36 Thousand/µL      Basophils Absolute 0 06 Thousands/µL     UA w Reflex to Microscopic w Reflex to Culture [[id number]] Collected: 05/28/23 2154    Lab Status: Final result Specimen: Urine, Clean Catch Updated: 05/28/23 2243     Color, UA Yellow     Clarity, UA Clear     Specific Gravity, UA 1 025     pH, UA 6 0     Leukocytes, UA Negative     Nitrite, UA Negative     Protein, UA Negative mg/dl      Glucose, UA Negative mg/dl      Ketones, UA Negative mg/dl      Urobilinogen, UA 0 2 E U /dl      Bilirubin, UA Negative     Occult Blood, UA Negative    Comprehensive metabolic panel [[id number]] Collected: 05/28/23 2154    Lab Status: Final result Specimen: Blood from Arm, Right Updated: 05/28/23 2222     Sodium 135 mmol/L      Potassium 3 7 mmol/L      Chloride 104 mmol/L      CO2 23 mmol/L      ANION GAP 8 mmol/L      BUN 14 mg/dL      Creatinine 0 76 mg/dL      Glucose 76 mg/dL      Calcium 9 4 mg/dL      AST 13 U/L      ALT 7 U/L      Alkaline Phosphatase 63 U/L      Total Protein 7 5 g/dL      Albumin 4 5 g/dL      Total Bilirubin 0 23 mg/dL      eGFR 113 ml/min/1 73sq m     Narrative:      [facility] guidelines for Chronic Kidney Disease (CKD):   •  Stage 1 with normal or high GFR (GFR > 90 mL/min/1 73 square meters)  •  Stage 2 Mild CKD (GFR = 60-89 mL/min/1 73 square meters)  •  Stage 3A Moderate CKD (GFR = 45-59 mL/min/1 73 square meters)  •  Stage 3B Moderate CKD (GFR = 30-44 mL/min/1 73 square meters)  •  Stage 4 Severe CKD (GFR = 15-29 mL/min/1 73 square meters)  •  Stage 5 End Stage CKD (GFR <15 mL/min/1 73 square meters)  Note: GFR calculation is accurate only with a steady state creatinine    CBC and differential [[id number]]  (Abnormal) Collected: 05/28/23 2154    Lab Status: Final result Specimen: Blood from Arm, Right Updated: 05/28/23 2206     WBC 10 04 Thousand/uL      RBC 4 61 Million/uL      Hemoglobin 13 4 g/dL      Hematocrit 40 4 %      MCV 88 fL      MCH 29 1 pg      MCHC 33 2 g/dL      RDW 12 2 %      MPV 10 3 fL      Platelets 527 Thousands/uL      nRBC 0 /100 WBCs      Neutrophils Relative 54 %      Immat GRANS % 0 %      Lymphocytes Relative 33 %      Monocytes Relative 8 %      Eosinophils Relative 4 %      Basophils Relative 1 %      Neutrophils Absolute 5 50 Thousands/µL      Immature Grans Absolute 0 01 Thousand/uL      Lymphocytes Absolute 3 28 Thousands/µL      Monocytes Absolute 0 79 Thousand/µL      Eosinophils Absolute 0 40 Thousand/µL      Basophils Absolute 0 06 Thousands/µL     POCT pregnancy, urine [[id number]]  (Normal) Resulted: 05/28/23 2153    Lab Status: Final result Updated: 05/28/23 2153     EXT Preg Test, Ur Negative     Control Valid                 US kidney and bladder   Final Result by Sp Stroud MD (05/29 5234)         1  Right hydronephrosis with 5 mm calculus in the distal right ureter  2  Bilateral nonobstructing renal calculi    3  1 9 cm left renal cyst with a thin internal septation  Sonographic follow-up should be obtained in 1 year interval    The study was marked in [facility] for immediate notification  Workstation performed: [id number]                    Procedures  Procedures         ED Course                                             Medical Decision Making  Pt has had 8 Cts in the last 2 5 years  Will attempt to safely avoid CT images at this time  To er with right flank pain, hx of renal stone dx about 5 days ago  No fever, chills  uti at time of dx , on abx  Large bld at that time  In er today ua clean  She was admitted in march with severe right flank pain unclear etiology  Will plan to US in AM ro hydro  If hydro will likely requiring stenting  Will plan to obs here and transfer with hydro as per rec of [facility] of urology  Spoke to dr Kira Sheth who has accepted for [facility]  Pain is not controlled with Toradol and or morphine but she did have sx resolution with dilaudid  Amount and/or Complexity of Data Reviewed  Labs: ordered  Risk  Prescription drug management  Decision regarding hospitalization            Disposition  Final diagnoses:   Right flank pain   At risk for inadequate pain control     Time reflects when diagnosis was documented in both MDM as applicable and the Disposition within this note     Time User Action Codes Description Comment    5/28/2023 11:22 PM Eren Curling Add [R10 9] Right flank pain     5/28/2023 11:23 PM Hildy Brain [Z91 89] At risk for inadequate pain control     5/29/2023 10:38 AM Ray Kill Add [N20 1] Ureterolithiasis       ED Disposition     ED Disposition   Admit    Condition   Stable    Date/Time   Sun May 28, 2023 11:19 PM    Comment   Case was discussed with tang and the patient's admission status was agreed to be Admission Status: observation status to the service of Dr Andres Maldonado              Follow-up Information    None         Discharge Medication List as of 5/29/2023 12:36 PM      CONTINUE these medications which have NOT CHANGED    Details   albuterol (PROVENTIL HFA,VENTOLIN HFA) 90 mcg/act inhaler Inhale 2 puffs every 6 (six) hours as needed for wheezing, Starting Thu 9/1/2022, Normal      ascorbic acid (VITAMIN C) 500 MG tablet Take 500 mg by mouth, Historical Med      bisacodyl (DULCOLAX) 5 mg EC tablet Take 2 tablets (10 mg total) by mouth in the morning for 14 doses Colonoscopy prep, Starting Thu 11/17/2022, Until Thu 12/1/2022, Normal      busPIRone (BUSPAR) 7 5 mg tablet Take 7 5 mg by mouth 2 (two) times a day, Starting Mon 3/20/2023, Historical Med      fluticasone (FLONASE) 50 mcg/act nasal spray 1 spray into each nostril daily, Starting Thu 9/1/2022, Normal      Multiple Vitamin (Multi-Day) TABS Take 1 tablet by mouth, Historical Med      naproxen (Naprosyn) 500 mg tablet Take 1 tablet (500 mg total) by mouth 2 (two) times a day with meals for 5 days, Starting Tue 5/23/2023, Until Sun 5/28/2023, Normal      norethindrone-ethinyl estradiol (Junel 1/20) 1-20 MG-MCG per tablet Take 1 tablet by mouth daily, Starting Wed 11/2/2022, Normal      omeprazole (PriLOSEC) 40 MG capsule Take 1 capsule (40 mg total) by mouth daily, Starting Wed 3/29/2023, Until Mon 9/25/2023, Normal      !! ondansetron (Zofran ODT) 4 mg disintegrating tablet Take 1 tablet (4 mg total) by mouth every 6 (six) hours as needed for nausea or vomiting, Starting Mon 3/27/2023, Normal      oxyCODONE (Roxicodone) 5 immediate release tablet Take 1 tablet (5 mg total) by mouth every 6 (six) hours as needed for moderate pain for up to 11 doses Max Daily Amount: 20 mg, Starting Tue 5/23/2023, Normal      Probiotic Product (Misc Intestinal Vikki Regulat) CAPS Take by mouth, Historical Med      promethazine (PHENERGAN) 12 5 MG tablet Take 1 tablet (12 5 mg total) by mouth every 6 (six) hours as needed for nausea or vomiting, Starting Tue 10/25/2022, Normal      sucralfate (CARAFATE) 1 g tablet Take 1 tablet (1 g total) by mouth 4 (four) times a day for 12 doses, Starting Sun 10/23/2022, Until Wed 10/26/2022, Normal      Symbicort 160-4 5 MCG/ACT inhaler Inhale 2 puffs 2 (two) times a day Rinse mouth after use , Starting Tue 12/20/2022, Normal      tamsulosin (FLOMAX) 0 4 mg Take 1 capsule (0 4 mg total) by mouth daily with dinner for 3 days, Starting Tue 5/23/2023, Until Fri 5/26/2023, Normal      !! Zofran ODT 4 MG disintegrating tablet Take 1 tablet (4 mg total) by mouth every 6 (six) hours as needed for nausea or vomiting, Starting Sun 10/23/2022, Normal       !! - Potential duplicate medications found  Please discuss with provider  STOP taking these medications       cephalexin (KEFLEX) 500 mg capsule Comments:   Reason for Stopping:               No discharge procedures on file      PDMP Review     None          ED Provider  Electronically Signed by           Olivia Delarosa MD  05/29/23 9415

## 2023-05-29 NOTE — H&P
[address]  H&P  Name: Flores Funes 23 y o  female I MRN: [id number]  Unit/Bed#: RM01 I Date of Admission: 5/28/2023   Date of Service: 5/29/2023 I Hospital Day: 0      Assessment/Plan   * Right flank pain  Assessment & Plan  Likely secondary to 5 mm distal right ureteral calculus above the UVJ with moderate right hydroureteronephrosis noted on CT on 5/23/2023  · Urine culture on 5/23/2023 showed <10,000 GBS, patient has been on Keflex but UA today is unremarkable, no further antibiotics  · Urinalysis today is unremarkable including no trace or occult blood notable, possible that patient may have passed stone though she did not note this  · Will continue Flomax 0 4 mg daily and strain all urine, stone analysis ordered if stone collected  · No signs of active infection, sepsis  · Patient is being admitted for pain management due to minimal resolution with IV Toradol 30 mg x 1, IV morphine 4 mg x 1, IV Dilaudid 0 5 mg x 1  · ER physician reports case was discussed with on-call urologist who recommended ultrasound to assess for hydronephrosis and if present patient can be transferred for stenting but otherwise no intervention indicated  · Ultrasound ordered  · N p o  if surgical intervention needed patient is not on anticoagulation  · Pt is otherwise hemodynamically stable without any signs or symptoms of pyelonephritis, sepsis, cystitis  · Pain management-IV morphine 2 mg every 6 hours as needed, IV Dilaudid 0 2 mg every 4 hours as needed for breakthrough, lidocaine 5% patch daily    Anxiety  Assessment & Plan  Continue home BuSpar 7 5 mg twice daily    Gastroesophageal reflux disease  Assessment & Plan  Continue home Prilosec 40 mg daily    Moderate persistent asthma  Assessment & Plan  Stable  Continue with Symbicort and albuterol       VTE Pharmacologic Prophylaxis: VTE Score: 1 Low Risk (Score 0-2) - Encourage Ambulation    Code Status: Level 1 - Full Code   Discussion with family: Updated  (father) at bedside  Anticipated Length of Stay: Patient will be admitted on an observation basis with an anticipated length of stay of less than 2 midnights secondary to Non-intractable right flank pain  Total Time Spent on Date of Encounter in care of patient: 75 minutes This time was spent on one or more of the following: performing physical exam; counseling and coordination of care; obtaining or reviewing history; documenting in the medical record; reviewing/ordering tests, medications or procedures; communicating with other healthcare professionals and discussing with patient's family/caregivers  Chief Complaint: Right flank pain    History of Present Illness:  Helen Flores is a 23 y o  female with a PMH of anxiety, GERD, asthma who presents with worsening right flank pain  Was seen in ED on 523 at which time CT was done that showed a 5 mm distal right ureteral calculus above the UVJ with moderate right hydroureteronephrosis  Patient was discharged home with pain medicine and Flomax  Patient is presenting today with non-intractable right-sided flank pain, not resolved with oxycodone 5 mg  Denies hematuria but does report some dysuria  Did have 1 episode of emesis secondary to pain but denies fever, chills  She has been taking Keflex that was given on 5/23 due to suspected UTI  Reports she did not see a stone in her urine so is unsure if it has passed  Continues to have mild radiation to the front but not to the groin, though she did have groin radiation a few days ago  Review of Systems:  Review of Systems   Constitutional: Negative for chills and fever  HENT: Negative for ear pain and sore throat  Eyes: Negative for pain and visual disturbance  Respiratory: Negative for cough, chest tightness and shortness of breath  Cardiovascular: Negative for chest pain and palpitations  Gastrointestinal: Positive for vomiting   Negative for abdominal pain, constipation, diarrhea and nausea  Genitourinary: Positive for flank pain  Negative for dysuria, hematuria and menstrual problem  Musculoskeletal: Negative for arthralgias and back pain  Skin: Negative for color change and rash  Neurological: Negative for seizures and syncope  Psychiatric/Behavioral: Negative for dysphoric mood and suicidal ideas  All other systems reviewed and are negative  Past Medical and Surgical History:   Past Medical History:   Diagnosis Date   • Exercise-induced asthma    • Kidney stone    • Ovarian cyst        Past Surgical History:   Procedure Laterality Date   • APPENDECTOMY     • UPPER GASTROINTESTINAL ENDOSCOPY     • WISDOM TOOTH EXTRACTION     • WISDOM TOOTH EXTRACTION         Meds/Allergies:  Prior to Admission medications    Medication Sig Start Date End Date Taking?  Authorizing Provider   albuterol (PROVENTIL HFA,VENTOLIN HFA) 90 mcg/act inhaler Inhale 2 puffs every 6 (six) hours as needed for wheezing 9/1/22   Cristal Fitzgerald MD   ascorbic acid (VITAMIN C) 500 MG tablet Take 500 mg by mouth    Historical Provider, MD   bisacodyl (DULCOLAX) 5 mg EC tablet Take 2 tablets (10 mg total) by mouth in the morning for 14 doses Colonoscopy prep 11/17/22 12/1/22  Марина Coto MD   busPIRone (BUSPAR) 7 5 mg tablet Take 7 5 mg by mouth 2 (two) times a day 3/20/23   Historical Provider, MD   cephalexin (KEFLEX) 500 mg capsule Take 1 capsule (500 mg total) by mouth every 6 (six) hours for 5 days 5/23/23 5/28/23  Scott Vazquez MD   fluticasone Chuy Valentin) 50 mcg/act nasal spray 1 spray into each nostril daily 9/1/22   Cristal Fitzgerald MD   Multiple Vitamin (Multi-Day) TABS Take 1 tablet by mouth    Historical Provider, MD   naproxen (Naprosyn) 500 mg tablet Take 1 tablet (500 mg total) by mouth 2 (two) times a day with meals for 5 days 5/23/23 5/28/23  Scott Vazquez MD   norethindrone-ethinyl estradiol (Junel 1/20) 1-20 MG-MCG per tablet Take 1 tablet by mouth daily 11/2/22   Kymberly Johnson MD   omeprazole (PriLOSEC) 40 MG capsule Take 1 capsule (40 mg total) by mouth daily 3/29/23 9/25/23  Dominick Alba MD   ondansetron (Zofran ODT) 4 mg disintegrating tablet Take 1 tablet (4 mg total) by mouth every 6 (six) hours as needed for nausea or vomiting 3/27/23   Bettejane Kawasaki, DO   oxyCODONE (Roxicodone) 5 immediate release tablet Take 1 tablet (5 mg total) by mouth every 6 (six) hours as needed for moderate pain for up to 11 doses Max Daily Amount: 20 mg 5/23/23   Saloni Live MD   Probiotic Product (Misc Intestinal Vikki Regulat) CAPS Take by mouth    Historical MD Dianna   promethazine (PHENERGAN) 12 5 MG tablet Take 1 tablet (12 5 mg total) by mouth every 6 (six) hours as needed for nausea or vomiting 10/25/22   Dominick Alba MD   sucralfate (CARAFATE) 1 g tablet Take 1 tablet (1 g total) by mouth 4 (four) times a day for 12 doses 10/23/22 10/26/22  Viv Gaines MD   Symbicort 160-4 5 MCG/ACT inhaler Inhale 2 puffs 2 (two) times a day Rinse mouth after use  12/20/22   Zachary Mendoza MD   tamsulosin [facility]) 0 4 mg Take 1 capsule (0 4 mg total) by mouth daily with dinner for 3 days 5/23/23 5/26/23  Saloni Live MD   Zofran ODT 4 MG disintegrating tablet Take 1 tablet (4 mg total) by mouth every 6 (six) hours as needed for nausea or vomiting 10/23/22   Viv Gaines MD     I have reviewed home medications with patient personally      Allergies: No Known Allergies    Social History:  Marital Status: Single     Substance Use History:   Social History     Substance and Sexual Activity   Alcohol Use Yes    Comment: social      Social History     Tobacco Use   Smoking Status Never   Smokeless Tobacco Never     Social History     Substance and Sexual Activity   Drug Use Never       Family History:  Family History   Problem Relation Age of Onset   • Heart disease Maternal Grandfather        Physical Exam:     Vitals: Blood Pressure: 140/81 (05/28/23 2145)  Pulse: 92 (05/28/23 2145)  Temperature: 98 °F (36 7 °C) (05/28/23 2144)  Temp Source: Temporal (05/28/23 2144)  Respirations: 19 (05/28/23 2145)  SpO2: 98 % (05/28/23 2145)    Physical Exam  Constitutional:       Appearance: Normal appearance  HENT:      Head: Normocephalic and atraumatic  Cardiovascular:      Rate and Rhythm: Normal rate and regular rhythm  Pulses: Normal pulses  Heart sounds: No murmur heard  Pulmonary:      Effort: Pulmonary effort is normal  No respiratory distress  Breath sounds: Normal breath sounds  No wheezing  Abdominal:      General: Bowel sounds are normal  There is no distension  Palpations: Abdomen is soft  Tenderness: There is no abdominal tenderness  There is right CVA tenderness  There is no left CVA tenderness  Musculoskeletal:      Right lower leg: No edema  Left lower leg: No edema  Skin:     General: Skin is warm and dry  Capillary Refill: Capillary refill takes less than 2 seconds  Neurological:      General: No focal deficit present  Mental Status: She is alert and oriented to person, place, and time     Psychiatric:         Mood and Affect: Mood normal          Behavior: Behavior normal        Additional Data:     Lab Results:  Results from last 7 days   Lab Units 05/28/23  2154   EOS PCT % 4   HEMATOCRIT % 40 4   HEMOGLOBIN g/dL 13 4   LYMPHS PCT % 33   MONOS PCT % 8   NEUTROS PCT % 54   PLATELETS Thousands/uL 407*   WBC Thousand/uL 10 04     Results from last 7 days   Lab Units 05/28/23  2154   ANION GAP mmol/L 8   ALBUMIN g/dL 4 5   ALK PHOS U/L 63   ALT U/L 7   AST U/L 13   BUN mg/dL 14   CALCIUM mg/dL 9 4   CHLORIDE mmol/L 104   CO2 mmol/L 23   CREATININE mg/dL 0 76   GLUCOSE RANDOM mg/dL 76   POTASSIUM mmol/L 3 7   SODIUM mmol/L 135   TOTAL BILIRUBIN mg/dL 0 23     Results from last 7 days   Lab Units 05/23/23  1308   INR  0 99                   Lines/Drains:  Invasive Devices Peripheral Intravenous Line  Duration           Peripheral IV 05/28/23 Right Antecubital <1 day                    Imaging: No imaging done during this admission but CT renal from 5/23 reviewed    ** Please Note: This note has been constructed using a voice recognition system   **

## 2023-05-29 NOTE — OP NOTE
OPERATIVE REPORT  PATIENT NAME: Mc Guerrero    :  2000  MRN: [id number]  Pt Location: [facility] CYSTO ROOM 01    SURGERY DATE: 2023    Surgeon(s) and Role:     Laine Leyden, MD - Primary    Preop Diagnosis:  Ureterolithiasis [N20 1] right 5 mm distal stone    Post-Op Diagnosis Codes:     * Ureterolithiasis [N20 1] right 5 mm distal ureteral stone    Procedure(s):  Right - CYSTOSCOPY URETEROSCOPY WITH LITHOTRIPSY HOLMIUM LASER  INSERTION STENT URETERAL    Specimen(s):  * No specimens in log *    Estimated Blood Loss:   Minimal    Drains:  * No LDAs found *    Anesthesia Type:   General    Operative Indications:  Ureterolithiasis [N20 1]  Right 5 mm distal ureteral stone    Operative Findings:  Impacted 5 mm or larger distal right ureteral stone, difficult to get wire past, broken up with laser into tiny passable fragments  Complications:   None    Procedure and Technique:  23year-old transfer from [address] [place] [address] for 5 mm stone that is not passing  2 ER visits  Still has right flank pain  Hydronephrosis  Smaller stones measuring up to 4 mm in each kidney  About 1 in each kidney  No fever  No sign of infection  Offered cystoscopy right ureteroscopy laser lithotripsy stone basket extraction and right ureteral stent placement  The risks of bleeding infection damage urinary tract and need for additional procedures were explained and she is informed sent  The patient was brought to the operating identified probably  LMA was induced patient was prepped and draped in dorsolithotomy position in the usual fashion  A timeout was performed  Cystoscopy was carried out with a 20 Setswana cystoscopy sheath and 30 degree lens  The urethra was normal the stricture  The bladder was smooth not trabeculated and there were no stones tumors or lesions  The orifices were orthotopic and intact  A stone could be visualized about 3 to 4 cm from the ureteral orifice    I placed a 0 035 inch guidewire with difficulty up into the renal pelvis under fluoroscopic guidance after bumping the stone proximally with a tiger tail catheter  I then placed the rigid ureteroscope into the distal ureter and visualized the stone  This was too big for basketing  I took the 272 holmium laser fiber and broke up into tiny passable fragments  Some fragments came out as I pulled the scope out of the ureter  I then placed 6 [place] by 26 cm stent over the wire and coils were established in renal pelvis and the bladder  The bladder was drained with cystoscopy sheath and the stent string was taped to the lower abdomen  I was present for the entire procedure  and A qualified resident physician was not available      Patient Disposition:  PACU  and extubated and stable        SIGNATURE: Ethan Treviño MD  DATE: May 29, 2023  TIME: 4:08 PM

## 2023-05-29 NOTE — ANESTHESIA PREPROCEDURE EVALUATION
Procedure:  CYSTOSCOPY URETEROSCOPY WITH LITHOTRIPSY HOLMIUM LASER, RETROGRADE PYELOGRAM AND INSERTION STENT URETERAL (Right: Bladder)    Relevant Problems   GI/HEPATIC   (+) Gastroesophageal reflux disease      /RENAL   (+) Renal cyst      NEURO/PSYCH   (+) Anxiety      PULMONARY   (+) Moderate persistent asthma      Other   (+) Obesity (BMI 30 0-34 9)   (+) SIRS (systemic inflammatory response syndrome) (HCC)        Lab Results   Component Value Date    HCT 34 6 (L) 05/29/2023    HGB 11 5 05/29/2023    MCV 87 05/29/2023     05/29/2023    WBC 9 87 05/29/2023     Lab Results   Component Value Date    BUN 14 05/29/2023    CALCIUM 8 7 05/29/2023     05/29/2023    CO2 24 05/29/2023    CREATININE 0 80 05/29/2023    GLUC 98 05/29/2023    K 3 2 (L) 05/29/2023    SODIUM 137 05/29/2023     Lab Results   Component Value Date    INR 0 99 05/23/2023    INR 1 05 09/29/2022    PROTIME 13 3 05/23/2023    PROTIME 13 9 09/29/2022     Lab Results   Component Value Date    HGBA1C 4 8 07/06/2022            Physical Exam    Airway    Mallampati score: I  TM Distance: >3 FB  Neck ROM: full     Dental   No notable dental hx     Cardiovascular      Pulmonary      Other Findings        Anesthesia Plan  ASA Score- 2     Anesthesia Type- general with ASA Monitors  Additional Monitors:   Airway Plan: LMA  Plan Factors-Exercise tolerance (METS): >4 METS  Chart reviewed  EKG reviewed  Existing labs reviewed  Patient summary reviewed  Patient is not a current smoker  Obstructive sleep apnea risk education given perioperatively  Induction- intravenous  Postoperative Plan-     Informed Consent- Anesthetic plan and risks discussed with patient  I personally reviewed this patient with the CRNA  Discussed and agreed on the Anesthesia Plan with the CRNA  Hansa Cabezas

## 2023-05-29 NOTE — DISCHARGE SUMMARY
[address] [facility] [address] [place]  Discharge- Cristy Hanna 2000, 23 y o  female MRN: [id number]  Unit/Bed#: TT41 Encounter: [id number]  Primary Care Provider: YESSENIA Irving   Date and time admitted to hospital: 5/28/2023  9:41 PM    * Right flank pain  Assessment & Plan  Likely secondary to 5 mm distal right ureteral calculus above the UVJ with moderate right hydroureteronephrosis noted on CT on 5/23/2023  · Patient was on keflex for UTI prior to admission  UA on admission is unremarkable  Monitor off of antibiotics  · Continue Flomax 0 4 mg daily and strain all urine, stone analysis ordered if stone collected  · Pain management-IV morphine 2 mg every 6 hours as needed, IV Dilaudid 0 2 mg every 4 hours as needed for breakthrough, lidocaine 5% patch daily  · On-call Urology consulted - appreciate recommendations  · US KUB: Right hydronephrosis with 5 mm calculus in the distal right ureter  · Transferring the patient to [facility] for ureteral stenting and lithotripsy  Hypokalemia  Assessment & Plan  Replete and keep the K at 4  Follow BMP    Anxiety  Assessment & Plan  Continue home BuSpar 7 5 mg twice daily    Renal cyst  Assessment & Plan  On US KUB: 1 9 cm left renal cyst with a thin internal septation     Sonographic follow-up should be obtained in 1 year interval     Gastroesophageal reflux disease  Assessment & Plan  Continue home Prilosec 40 mg daily    Moderate persistent asthma  Assessment & Plan  Not in exacerbation  Continue with Symbicort and albuterol        Discharging Physician / Practitioner: Rafaela Mcknight MD  PCP: YESSENIA Irving  Admission Date:   Admission Orders (From admission, onward)     Ordered        05/28/23 2319  Place in Observation  Once                      Discharge Date: 05/29/23    Medical Problems     Resolved Problems  Date Reviewed: 3/28/2023   None         Consultations During Hospital Stay:  · Urology    Procedures Performed:   · none    Significant Findings / Test Results:     US kidney and bladder    IMPRESSION:     1  Right hydronephrosis with 5 mm calculus in the distal right ureter  2  Bilateral nonobstructing renal calculi   3  1 9 cm left renal cyst with a thin internal septation  Sonographic follow-up should be obtained in 1 year interval   The study was marked in [facility] for immediate notification      Workstation performed: [id number]    CT renal stone study abdomen pelvis without contrast  IMPRESSION:     Distal right ureteral calculus, 5 mm  Right hydroureteronephrosis      Additional nonobstructing intrarenal calculi on the order of between 2 and 4 mm in size bilaterally         Workstation performed: [id number]    Incidental Findings:   · KUB US: 1 9 cm left renal cyst with a thin internal septation  Sonographic follow-up should be obtained in 1 year interval     Test Results Pending at Discharge (will require follow up):   · none     Outpatient Tests Requested:  · none    Complications:  none    Reason for Admission: Nephrolithiasis with right hydronephrosis    HPI from H&P: Francis Gong is a 23 y o  female with a PMH of anxiety, GERD, asthma who presents with worsening right flank pain      Was seen in ED on 523 at which time CT was done that showed a 5 mm distal right ureteral calculus above the UVJ with moderate right hydroureteronephrosis  Patient was discharged home with pain medicine and Flomax  Patient is presenting today with non-intractable right-sided flank pain, not resolved with oxycodone 5 mg  Denies hematuria but does report some dysuria  Did have 1 episode of emesis secondary to pain but denies fever, chills  She has been taking Keflex that was given on 5/23 due to suspected UTI  Reports she did not see a stone in her urine so is unsure if it has passed  Continues to have mild radiation to the front but not to the groin, though she did have groin radiation a few days ago      Hospital Course:     Francis Gong is a 23 y o  female patient who originally presented to the hospital by her father on 5/28/2023 due to worsening right flank pain and admitted for further evaluation and management for nephrolithiasis, 5mm ureteral stone at VUJ on right with right hydronephrosis  Received IV hydration and Flomax to facilitate the stone passage  Adequate pain control achieved with as needed pain medication  Consulted on call urology who recommend US KUB to assess for obstruction  On US patient continued to have hydronephrosis on right with 5 mm stone at VUJ  Recommend transfer to [facility] for possible lithotripsy and stenting  Patient remains clinically and hemodynamically stable throughout the stay  The patient, initially admitted to the hospital as inpatient, was discharged earlier than expected given the following: Patient is being transferred [facility] for possible ureteral stenting and  lithotripsy by urology       Please see above list of diagnoses and related plan for additional information  Condition at Discharge: stable     Discharge Day Visit / Exam:     Subjective:  Seen and examined patient at bedside  Today  Not in distress  Reports adequate pain control with medication  Denies fever, chills, shortness of breath, chest pain  Vitals: Blood Pressure: 118/57 (05/29/23 0730)  Pulse: 68 (05/29/23 0730)  Temperature: 97 7 °F (36 5 °C) (05/29/23 0730)  Temp Source: Temporal (05/29/23 0730)  Respirations: 20 (05/29/23 0730)  SpO2: 96 % (05/29/23 0730)  Exam:   Physical Exam  Vitals and nursing note reviewed  Constitutional:       General: She is not in acute distress  Appearance: She is well-developed  HENT:      Head: Normocephalic and atraumatic  Right Ear: External ear normal       Left Ear: External ear normal       Nose: Nose normal       Mouth/Throat:      Mouth: Mucous membranes are moist       Pharynx: Oropharynx is clear  Eyes:      General: No scleral icterus       Extraocular Movements: Extraocular movements intact  Conjunctiva/sclera: Conjunctivae normal       Pupils: Pupils are equal, round, and reactive to light  Cardiovascular:      Rate and Rhythm: Normal rate and regular rhythm  Pulses: Normal pulses  Heart sounds: Normal heart sounds  No murmur heard  Pulmonary:      Effort: Pulmonary effort is normal  No respiratory distress  Breath sounds: Normal breath sounds  Abdominal:      General: Bowel sounds are normal       Palpations: Abdomen is soft  Tenderness: There is abdominal tenderness  There is right CVA tenderness  There is no left CVA tenderness  Musculoskeletal:         General: No swelling  Cervical back: Neck supple  Right lower leg: No edema  Left lower leg: No edema  Skin:     General: Skin is warm and dry  Capillary Refill: Capillary refill takes less than 2 seconds  Neurological:      Mental Status: She is alert and oriented to person, place, and time  Mental status is at baseline  Psychiatric:         Mood and Affect: Mood normal          Discussion with Family: Discussed with father at bedside  Discharge instructions/Information to patient and family:   See after visit summary for information provided to patient and family  Provisions for Follow-Up Care:  See after visit summary for information related to follow-up care and any pertinent home health orders  Disposition:     [address] [facility]  [address] Transfer to [place] [address] to [facility]:   · Not Applicable to this Patient - Not Applicable to this Patient    Planned Readmission: Yes, [facility]     Discharge Statement:  I spent 60 minutes discharging the patient  This time was spent on the day of discharge  I had direct contact with the patient on the day of discharge   Greater than 50% of the total time was spent examining patient, answering all patient questions, arranging and discussing plan of care with patient as well as directly providing post-discharge instructions  Additional time then spent on discharge activities  Discharge Medications:  See after visit summary for reconciled discharge medications provided to patient and family        ** Please Note: This note has been constructed using a voice recognition system **

## 2023-05-30 ENCOUNTER — TELEPHONE (OUTPATIENT)
Dept: NEPHROLOGY | Facility: CLINIC | Age: 23
End: 2023-05-30

## 2023-05-30 ENCOUNTER — TELEPHONE (OUTPATIENT)
Dept: UROLOGY | Facility: CLINIC | Age: 23
End: 2023-05-30

## 2023-05-30 NOTE — TELEPHONE ENCOUNTER
Patient called stating she missed the call in regards to scheduling her stent removal      Patient can be reached at [phone]

## 2023-05-30 NOTE — TELEPHONE ENCOUNTER
Called and confirmed time date and location for 6 month follow up and reviewed stent on string removal at home

## 2023-05-30 NOTE — TELEPHONE ENCOUNTER
----- Message from Lauren Latham MD sent at 5/29/2023  4:16 PM EDT -----  Patient underwent ureteroscopy and laser lithotripsy today-please have her come in next week for stent removal by pulling on the string and please make referral to nephrology which has been ordered  Then follow-up and see us in 6 months

## 2023-05-31 NOTE — UTILIZATION REVIEW
NOTIFICATION OF EMERGENT OUTPATIENT PROCEDURE   AUTHORIZATION REQUEST   SERVICING FACILITY:   [facility]  Address: [address] [place] [address]  Tax ID: [id number]  NPI: [id number] ATTENDING PROVIDER:  Attending Name and NPI#: Brandi Skinner Md [[id number]]  Address: [address] [place] [address]  Phone: [phone]   ADMISSION INFORMATION:  Place of Service: On [address] [facility] Code: 22 CPT Code: , 08640    Patient presented to the ED and had an outpatient procedure     OUTPATIENT PROCEDURE INFORMATION  Surgery Date: 5/29/2023  Discharge Date/Time: 5/29/2023  6:42 PM  Patient Preop Diagnosis:   Ureterolithiasis [N20 1] Post-Op Diagnosis Codes:     * Ureterolithiasis [N20 1]  Operative Indications:   Ureterolithiasis [N20 1]  Procedure(s) (LRB):  CYSTOSCOPY URETEROSCOPY WITH LITHOTRIPSY HOLMIUM LASER,  INSERTION STENT URETERAL (Right)  CYSTOSCOPY URETEROSCOPY WITH LITHOTRIPSY HOLMIUM LASER,  INSERTION STENT URETERAL: 02592 (CPT®)   Procedure:    CYSTOSCOPY URETEROSCOPY WITH LITHOTRIPSY HOLMIUM LASER,  INSERTION STENT URETERAL  CPT(R) Code:  76071 - DC CYSTO/URETERO W/LITHOTRIPSY &INDWELL STENT INSRT       UTILIZATION REVIEW CONTACT:  Jorge Gerard, Utilization   Network Utilization Review Department  Phone: [phone]  Fax: [phone]  Email: [email or web address]   Contact for approvals/pending authorizations, clinical reviews, and discharge  PHYSICIAN ADVISORY SERVICES:  Medical Necessity Denial & Ncvq-vt-Isui Review  Phone: [phone]  Fax: [phone]  Email: [email or web address]

## 2023-06-02 NOTE — TELEPHONE ENCOUNTER
Voicemail left for patient to please return call to see if she was able to remove her stent  Patient is scheduled for Nephrology appointment on 6/9/23

## 2023-06-02 NOTE — TELEPHONE ENCOUNTER
Spoke with Denisse and confirmed she removed stent with string this morning  She is feeling well  Reminded patient of 6 month follow up with renal US prior

## 2023-06-09 ENCOUNTER — OFFICE VISIT (OUTPATIENT)
Dept: NEPHROLOGY | Facility: CLINIC | Age: 23
End: 2023-06-09
Payer: COMMERCIAL

## 2023-06-09 VITALS
HEIGHT: 70 IN | BODY MASS INDEX: 34.22 KG/M2 | HEART RATE: 85 BPM | DIASTOLIC BLOOD PRESSURE: 82 MMHG | WEIGHT: 239 LBS | OXYGEN SATURATION: 95 % | SYSTOLIC BLOOD PRESSURE: 104 MMHG

## 2023-06-09 DIAGNOSIS — N28.1 RENAL CYST: Primary | ICD-10-CM

## 2023-06-09 DIAGNOSIS — R10.9 RIGHT FLANK PAIN: ICD-10-CM

## 2023-06-09 DIAGNOSIS — N20.0 RECURRENT NEPHROLITHIASIS: ICD-10-CM

## 2023-06-09 DIAGNOSIS — E87.6 HYPOKALEMIA: ICD-10-CM

## 2023-06-09 DIAGNOSIS — R82.71 BACTERIURIA: ICD-10-CM

## 2023-06-09 DIAGNOSIS — N39.0 RECURRENT URINARY TRACT INFECTION: ICD-10-CM

## 2023-06-09 DIAGNOSIS — N20.1 URETEROLITHIASIS: ICD-10-CM

## 2023-06-09 PROCEDURE — 99215 OFFICE O/P EST HI 40 MIN: CPT | Performed by: PHYSICIAN ASSISTANT

## 2023-06-09 RX ORDER — METHENAMINE HIPPURATE 1000 MG/1
1 TABLET ORAL 2 TIMES DAILY WITH MEALS
Qty: 30 TABLET | Refills: 3 | Status: SHIPPED | OUTPATIENT
Start: 2023-06-09

## 2023-06-09 RX ORDER — POTASSIUM CITRATE 10 MEQ/1
10 TABLET, EXTENDED RELEASE ORAL
Qty: 90 TABLET | Refills: 3 | Status: SHIPPED | OUTPATIENT
Start: 2023-06-09

## 2023-06-09 NOTE — PATIENT INSTRUCTIONS
Today we discussed your kidney stones and recurrent urinary tract infections  We also found that you have low potassium level in your blood  I have prescribed a potassium citrate  This helps treat the low potassium in the blood and also treats if you happen to have a particular cause of kidney stones that is low citrate in the urine  Regarding the urinary tract infections, we primarily need to treat the stones which are leading to the infections, but in addition to that we can acidify the urine by adding methenamine hippurate 1 g twice daily  As always with kidney stones, keep your total fluid intake greater than 2 L/day, avoid vitamin C supplementation, add lemon and concentrate to your water

## 2023-06-09 NOTE — PROGRESS NOTES
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  She does drink a lot of water  She has not been adding lemon to her water  She does supplement vitamin C sometimes, which I have asked her to discontinue due to risk of calcium oxalate crystals  PTH normal   We will follow-up Litholink results when available and provide targeted recommendations  In the interim we will continue adequate hydration, recommended lemon addition to water, low-sodium diet  She has hypokalemia as well  I have added potassium citrate, as this would also treat hypocitraturia  She will try to do the [address] [place] [address] Blvd prior to starting this medication to determine if she has hypocitraturia  Defer genetic evaluation to primary nephrologist     Orders:  -     Ambulatory Referral to Nephrology  -     potassium citrate (UROCIT-K) 10 mEq; Take 1 tablet (10 mEq total) by mouth 3 (three) times a day with meals  -     Basic metabolic panel; Future; Expected date: 09/01/2023  -     Protein / creatinine ratio, urine; Future; Expected date: 09/01/2023  -     Urine culture; Future; Expected date: 09/01/2023  -     Urinalysis with microscopic; Future; Expected date: 09/01/2023    4  Hypokalemia  -     potassium citrate (UROCIT-K) 10 mEq; Take 1 tablet (10 mEq total) by mouth 3 (three) times a day with meals    5  Bacteriuria  -     methenamine hippurate (HIPREX) 1 g tablet;  Take 1 tablet (1 g total) by mouth 2 (two) times a day with meals    6  Recurrent urinary tract infection  Assessment & Plan:  She has recurrent urinary tract infections  Will need to address nephrolithiasis, see above  Her mother asks about chronic Macrobid antibiotic suppression  Will try to avoid prolonged antibiotics, especially in this young patient, however will provide methenamine hippurate in hopes this can help acidify urine and reduce her overall antibiotic requirements  But again, the main focus should be reducing the stone burden  Orders:  -     methenamine hippurate (HIPREX) 1 g tablet; Take 1 tablet (1 g total) by mouth 2 (two) times a day with meals    7  Right flank pain  Assessment & Plan:  Now resolved after passing the stone on Sunday         The benefits, risks and alternatives to the treatment plan were discussed at this visit  Patient was advised of common adverse effects of any medical therapies prescribed  All questions were answered and discussed with the patient and any accompanying family members or caretakers  Subjective:      Patient ID: Ke Gan is a 23 y o  female with a past medical history of ovarian cyst, kidney stone, renal cyst who was seen in the [place] office  Patient underwent cystoscopy ureteroscopy with lithotripsy and insertion of ureteral stent by Dr Clif Bradshaw on 5/29/2023 for 5 mm stone that was not passing and hydronephrosis with smaller stones up to 4 mm in each kidney    HPI     Today, patient presents for above consultation for evaluation of proteinuria and nephrolithiasis as well as renal cyst   Patient was seen by Dr Kenneth Marrufo on 1/10/2023, at which time she was instructed to drink greater than 2 L fluid per day, low-sodium diet  Labs that were ordered were urine protein creatinine ratio, BRIGIDO, PTH, uric acid, UA, CMP, phosphorus  She passed a stone on Sunday     She does 2 L water per day, does supplement Vit C,     She had two UTIs   She has at least UTIs in a year  She doesn't get Sx until she has a 104 fever  Blood pressure is 104/82 HR 85  She does not have hypertension  Patient denies lower extremity swelling  Reviewed and discussed the results of labs performed 5/29/2023 which reveals excellent stable renal function with a creatinine of 0 8 mg per dose with estimated  mL/min  Hypokalemia potassium 3 2 mmol/L  Urinalysis specific gravity 1 025, negative dipstick  Urine culture less than 10,000 CFU per milliliter, mixed contaminants on 5/29/2023  History is obtained from patient  The following portions of the patient's history were reviewed and updated as appropriate: allergies, current medications, past family history, past medical history, past social history, past surgical history, and problem list     Review of Systems   Constitutional: Negative for activity change, appetite change, chills, fatigue, fever and unexpected weight change  Respiratory: Negative for apnea, cough and shortness of breath  Cardiovascular: Negative for chest pain, palpitations and leg swelling  Gastrointestinal: Negative for abdominal pain, blood in stool, constipation, diarrhea, nausea and vomiting  Genitourinary: Negative for decreased urine volume, difficulty urinating, dysuria, flank pain, frequency, hematuria and urgency  Musculoskeletal: Negative for arthralgias, back pain, joint swelling and myalgias  Skin: Negative for color change and rash  Allergic/Immunologic: Negative for immunocompromised state  Neurological: Negative for dizziness, seizures, syncope, weakness, light-headedness, numbness and headaches  Hematological: Negative for adenopathy  Does not bruise/bleed easily  Psychiatric/Behavioral: Negative for agitation, behavioral problems, confusion, decreased concentration, dysphoric mood and hallucinations  The patient is not nervous/anxious and is not hyperactive  All other systems reviewed and are negative  "     Objective:      /82 (BP Location: Left arm, Patient Position: Sitting, Cuff Size: Large) Comment: 98/78  Pulse 85   Ht 5' 10\" (1 778 m)   Wt 108 kg (239 lb)   SpO2 95%   BMI 34 29 kg/m²          Physical Exam  Vitals and nursing note reviewed  Exam conducted with a chaperone present  Constitutional:       General: She is not in acute distress  Appearance: Normal appearance  She is normal weight  She is not ill-appearing or toxic-appearing  HENT:      Head: Normocephalic and atraumatic  Nose: Nose normal  No congestion or rhinorrhea  Mouth/Throat:      Mouth: Mucous membranes are moist       Pharynx: Oropharynx is clear  Eyes:      General: No scleral icterus  Right eye: No discharge  Left eye: No discharge  Extraocular Movements: Extraocular movements intact  Conjunctiva/sclera: Conjunctivae normal       Pupils: Pupils are equal, round, and reactive to light  Cardiovascular:      Rate and Rhythm: Normal rate and regular rhythm  Pulses: Normal pulses  Heart sounds: Normal heart sounds  No murmur heard  Pulmonary:      Effort: Pulmonary effort is normal  No respiratory distress  Breath sounds: Normal breath sounds  No wheezing  Abdominal:      General: Abdomen is flat  Bowel sounds are normal  There is no distension  Palpations: Abdomen is soft  There is no mass  Tenderness: There is no abdominal tenderness  Musculoskeletal:         General: No swelling or deformity  Normal range of motion  Cervical back: Normal range of motion and neck supple  No rigidity or tenderness  Skin:     General: Skin is warm and dry  Coloration: Skin is not jaundiced or pale  Findings: No bruising  Neurological:      General: No focal deficit present  Mental Status: She is alert and oriented to person, place, and time  Mental status is at baseline     Psychiatric:         Mood and Affect: Mood normal          Behavior: " "Behavior normal          Thought Content:  Thought content normal          Judgment: Judgment normal              Lab Results   Component Value Date    AGAP 8 05/29/2023    ALKPHOS 63 05/28/2023    ALT 7 05/28/2023    AST 13 05/28/2023    BUN 14 05/29/2023    CALCIUM 8 7 05/29/2023     05/29/2023    CO2 24 05/29/2023    CREATININE 0 80 05/29/2023    EGFR 106 05/29/2023    GLUC 98 05/29/2023    GLUF 105 (H) 03/28/2023    K 3 2 (L) 05/29/2023    SODIUM 137 05/29/2023    TBILI 0 23 05/28/2023    TP 7 5 05/28/2023      Lab Results   Component Value Date    CREATININE 0 80 05/29/2023    CREATININE 0 76 05/28/2023    CREATININE 0 78 05/23/2023    CREATININE 0 49 (L) 03/28/2023    CREATININE 0 62 03/26/2023    CREATININE 0 65 10/23/2022    CREATININE 0 75 09/29/2022    CREATININE 0 76 09/29/2022    CREATININE 0 79 07/24/2022    CREATININE 0 56 (L) 06/27/2022    CREATININE 0 69 03/31/2022    CREATININE 0 64 03/28/2022    CREATININE 0 63 02/16/2021      Lab Results   Component Value Date    BACTERIA Moderate (A) 05/23/2023    BILIRUBINUR Negative 05/28/2023    BLOODU Negative 05/28/2023    CLARITYU Clear 05/28/2023    COLORU Yellow 05/28/2023    EPIS Occasional 05/23/2023    GLUCOSEU Negative 05/28/2023    KETONESU Negative 05/28/2023    LEUKOCYTESUR Negative 05/28/2023    NITRITE Negative 05/28/2023    PHUR 6 0 05/28/2023    PROTEIN UA Negative 05/28/2023    RBCUA Innumerable (A) 05/23/2023    SPECGRAV 1 025 05/28/2023    UROBILINOGEN 0 2 05/28/2023    WBCUA 10-20 (A) 05/23/2023      No results found for: \"LABPROT\"  No results found for: \"VUGN47ZGP\", \"MICROALBUR\"  Lab Results   Component Value Date    HCT 34 6 (L) 05/29/2023    HGB 11 5 05/29/2023    MCV 87 05/29/2023     05/29/2023    WBC 9 87 05/29/2023      Lab Results   Component Value Date    HGB 11 5 05/29/2023    HGB 13 4 05/28/2023    HGB 14 1 05/23/2023    HGB 11 5 03/28/2023    HGB 12 7 03/26/2023      No results found for: \"FERRITIN\", \"IRON\", \"TIBC\" " "  No results found for: \"PHOSPHORUS\", \"PTHCALCIUM\", \"CSYG57LBIPZV\"   No results found for: \"CHOLESTEROL\", \"HDL\", \"LDLCALC\", \"TRIG\"   No results found for: \"URICACID\"   Lab Results   Component Value Date    HGBA1C 4 8 07/06/2022      No results found for: \"FREET4\", \"Z7IOFDN\", \"TSHANTIBODY\"   No results found for: \"BRIGIDO\", \"DSDNAAB\", \"RFIGM\"   No results found for: \"IMMUNOFIX\", \"KAPPALAMBDA\", \"KAPPALIGHT\", \"PROT\", \"UPEP\"     Portions of the record may have been created with voice recognition software  Occasional wrong word or \"sound a like\" substitutions may have occurred due to the inherent limitations of voice recognition software  Read the chart carefully and recognize, using context, where substitutions have occurred  If you have any questions, please contact the dictating provider          "

## 2023-06-09 NOTE — ASSESSMENT & PLAN NOTE
Patient has not yet been able to perform Litholink due to her school schedule  She does drink a lot of water  She has not been adding lemon to her water  She does supplement vitamin C sometimes, which I have asked her to discontinue due to risk of calcium oxalate crystals  PTH normal   We will follow-up Litholink results when available and provide targeted recommendations  In the interim we will continue adequate hydration, recommended lemon addition to water, low-sodium diet  She has hypokalemia as well  I have added potassium citrate, as this would also treat hypocitraturia  She will try to do the [address] [place] [address] Blvd prior to starting this medication to determine if she has hypocitraturia    Defer genetic evaluation to primary nephrologist

## 2023-06-09 NOTE — ASSESSMENT & PLAN NOTE
She has recurrent urinary tract infections  Will need to address nephrolithiasis, see above  Her mother asks about chronic Macrobid antibiotic suppression  Will try to avoid prolonged antibiotics, especially in this young patient, however will provide methenamine hippurate in hopes this can help acidify urine and reduce her overall antibiotic requirements  But again, the main focus should be reducing the stone burden

## 2023-06-09 NOTE — ASSESSMENT & PLAN NOTE
Status post cystoscopy ureteroscopy with lithotripsy and insertion of ureteral stent by Dr Talib Horn on 5/29/2023 for 5 mm stone that was not passing and hydronephrosis with smaller stones up to 4 mm in each kidney  She did pass a stone, which she brought with her today

## 2023-06-15 ENCOUNTER — OFFICE VISIT (OUTPATIENT)
Dept: URGENT CARE | Age: 23
End: 2023-06-15
Payer: COMMERCIAL

## 2023-06-15 VITALS
DIASTOLIC BLOOD PRESSURE: 82 MMHG | RESPIRATION RATE: 16 BRPM | OXYGEN SATURATION: 98 % | TEMPERATURE: 97.6 F | HEIGHT: 70 IN | WEIGHT: 241 LBS | BODY MASS INDEX: 34.5 KG/M2 | SYSTOLIC BLOOD PRESSURE: 124 MMHG | HEART RATE: 88 BPM

## 2023-06-15 DIAGNOSIS — L73.9 FOLLICULITIS: Primary | ICD-10-CM

## 2023-06-15 PROCEDURE — G0382 LEV 3 HOSP TYPE B ED VISIT: HCPCS | Performed by: NURSE PRACTITIONER

## 2023-06-15 RX ORDER — DOXYCYCLINE 100 MG/1
100 CAPSULE ORAL 2 TIMES DAILY
Qty: 20 CAPSULE | Refills: 0 | Status: SHIPPED | OUTPATIENT
Start: 2023-06-15 | End: 2023-06-25

## 2023-06-15 NOTE — PROGRESS NOTES
[facility]        NAME: Ora Lozoya is a 23 y o  female  : 2000    MRN: [id number]  DATE: Sena 15, 2023  TIME: 10:32 AM    Assessment and Plan   Folliculitis [H42 4]  1  Folliculitis  doxycycline monohydrate (MONODOX) 100 mg capsule            Patient Instructions     Stop sucralfate while on doxycycline  Avoid excessive exposure to sun while on doxycycline   Follow up with PCP in 3-5 days  Proceed to  ER if symptoms worsen  Chief Complaint     Chief Complaint   Patient presents with   • Rash     Monday patient noticed rash on both legs and starting on her stomach  She applied neosporin but it did not help  States it looks like normal razor burn which she usually gets  She also started two new medications on  as well as moving into new apartment  History of Present Illness       HPI   Presents to clinic with complaint of rash on both legs and on the abdomen  Used over-the-counter antibiotic cream without much improvement  Started 5 days ago  She had shaved her legs about 6 days ago  No known exposure to suspicious chemicals or clothing  No suspicious foods  No recent travel  No hiking  Review of Systems   Review of Systems   Constitutional: Negative for fever  Respiratory: Negative for shortness of breath  Cardiovascular: Negative for chest pain  Gastrointestinal: Negative for vomiting  Musculoskeletal: Negative for back pain  Skin: Positive for color change (red) and rash (thigh and abdomen)           Current Medications       Current Outpatient Medications:   •  albuterol (PROVENTIL HFA,VENTOLIN HFA) 90 mcg/act inhaler, Inhale 2 puffs every 6 (six) hours as needed for wheezing, Disp: 18 g, Rfl: 5  •  busPIRone (BUSPAR) 7 5 mg tablet, Take 5 mg by mouth 2 (two) times a day, Disp: , Rfl:   •  doxycycline monohydrate (MONODOX) 100 mg capsule, Take 1 capsule (100 mg total) by mouth 2 (two) times a day for 10 days, Disp: 20 capsule, Rfl: 0  •  fluticasone (FLONASE) 50 mcg/act nasal spray, 1 spray into each nostril daily, Disp: 15 8 mL, Rfl: 5  •  methenamine hippurate (HIPREX) 1 g tablet, Take 1 tablet (1 g total) by mouth 2 (two) times a day with meals, Disp: 30 tablet, Rfl: 3  •  Multiple Vitamin (Multi-Day) TABS, Take 1 tablet by mouth, Disp: , Rfl:   •  omeprazole (PriLOSEC) 40 MG capsule, Take 1 capsule (40 mg total) by mouth daily, Disp: 30 capsule, Rfl: 5  •  potassium citrate (UROCIT-K) 10 mEq, Take 1 tablet (10 mEq total) by mouth 3 (three) times a day with meals, Disp: 90 tablet, Rfl: 3  •  Probiotic Product (Misc Intestinal Vikki Regulat) CAPS, Take by mouth, Disp: , Rfl:   •  promethazine (PHENERGAN) 12 5 MG tablet, Take 1 tablet (12 5 mg total) by mouth every 6 (six) hours as needed for nausea or vomiting, Disp: 30 tablet, Rfl: 0  •  Symbicort 160-4 5 MCG/ACT inhaler, Inhale 2 puffs 2 (two) times a day Rinse mouth after use , Disp: 30 6 g, Rfl: 5  •  Zofran ODT 4 MG disintegrating tablet, Take 1 tablet (4 mg total) by mouth every 6 (six) hours as needed for nausea or vomiting, Disp: 20 tablet, Rfl: 0  •  ascorbic acid (VITAMIN C) 500 MG tablet, Take 500 mg by mouth (Patient not taking: Reported on 6/15/2023), Disp: , Rfl:   •  bisacodyl (DULCOLAX) 5 mg EC tablet, Take 2 tablets (10 mg total) by mouth in the morning for 14 doses Colonoscopy prep, Disp: 28 tablet, Rfl: 0  •  ketorolac (TORADOL) 10 mg tablet, Take 1 tablet (10 mg total) by mouth every 6 (six) hours as needed for moderate pain for up to 5 days, Disp: 20 tablet, Rfl: 0  •  ketorolac (TORADOL) 10 mg tablet, Take 1 tablet (10 mg total) by mouth every 6 (six) hours as needed for moderate pain for up to 5 days, Disp: 20 tablet, Rfl: 0  •  naproxen (Naprosyn) 500 mg tablet, Take 1 tablet (500 mg total) by mouth 2 (two) times a day with meals for 5 days, Disp: 10 tablet, Rfl: 0  •  norethindrone-ethinyl estradiol (Junel 1/20) 1-20 MG-MCG per tablet, Take 1 tablet by mouth daily (Patient not taking: Reported on 6/9/2023), Disp: 84 tablet, Rfl: 1  •  oxybutynin (DITROPAN) 5 mg tablet, Take 1 tablet (5 mg total) by mouth 3 (three) times a day as needed (bladder spasm) (Patient not taking: Reported on 6/9/2023), Disp: 20 tablet, Rfl: 0  •  oxybutynin (DITROPAN) 5 mg tablet, Take 1 tablet (5 mg total) by mouth 3 (three) times a day as needed (bladder spasm) (Patient not taking: Reported on 6/9/2023), Disp: 20 tablet, Rfl: 0  •  oxyCODONE (ROXICODONE) 5 immediate release tablet, Take 1 tablet (5 mg total) by mouth every 6 (six) hours as needed for moderate pain for up to 10 doses Max Daily Amount: 20 mg (Patient not taking: Reported on 6/9/2023), Disp: 10 tablet, Rfl: 0  •  oxyCODONE (ROXICODONE) 5 immediate release tablet, Take 1 tablet (5 mg total) by mouth every 6 (six) hours as needed for moderate pain for up to 10 doses Max Daily Amount: 20 mg (Patient not taking: Reported on 6/9/2023), Disp: 10 tablet, Rfl: 0  •  phenazopyridine (PYRIDIUM) 200 mg tablet, Take 1 tablet (200 mg total) by mouth 3 (three) times a day as needed for bladder spasms (Patient not taking: Reported on 6/9/2023), Disp: 30 tablet, Rfl: 0  •  phenazopyridine (PYRIDIUM) 200 mg tablet, Take 1 tablet (200 mg total) by mouth 3 (three) times a day as needed for bladder spasms (Patient not taking: Reported on 6/9/2023), Disp: 30 tablet, Rfl: 0  •  sucralfate (CARAFATE) 1 g tablet, Take 1 tablet (1 g total) by mouth 4 (four) times a day for 12 doses, Disp: 12 tablet, Rfl: 0    Current Allergies     Allergies as of 06/15/2023   • (No Known Allergies)            The following portions of the patient's history were reviewed and updated as appropriate: allergies, current medications, past family history, past medical history, past social history, past surgical history and problem list      Past Medical History:   Diagnosis Date   • Exercise-induced asthma    • Kidney stone    • Ovarian cyst        Past Surgical History:   Procedure Laterality Date   • "APPENDECTOMY     • [place] CYSTO/URETERO W/LITHOTRIPSY &INDWELL STENT INSRT Right 5/29/2023    Procedure: CYSTOSCOPY URETEROSCOPY WITH LITHOTRIPSY HOLMIUM LASER,  INSERTION STENT URETERAL;  Surgeon: Britany Granger MD;  Location: [facility];  Service: Urology   • UPPER GASTROINTESTINAL ENDOSCOPY     • WISDOM TOOTH EXTRACTION     • WISDOM TOOTH EXTRACTION         Family History   Problem Relation Age of Onset   • Heart disease Maternal Grandfather          Medications have been verified  Objective   /82   Pulse 88   Temp 97 6 °F (36 4 °C)   Resp 16   Ht 5' 10\" (1 778 m)   Wt 109 kg (241 lb)   SpO2 98%   BMI 34 58 kg/m²   No LMP recorded  Physical Exam     Physical Exam  Constitutional:       Appearance: She is not ill-appearing or diaphoretic  Musculoskeletal:         General: No swelling or tenderness  Skin:     Findings: Rash (erythematous rash mainly a right thigh, less on the left thigh and abdomen  slightly elevated, mainly within hair follicules, small amount of pus at the top of the rash) present  No bruising                     "

## 2023-06-23 ENCOUNTER — TELEPHONE (OUTPATIENT)
Dept: NEPHROLOGY | Facility: CLINIC | Age: 23
End: 2023-06-23

## 2023-06-23 NOTE — TELEPHONE ENCOUNTER
Tried reaching patient, no answer  Left message on voicemail for patient to return call regarding form for school

## 2023-06-26 ENCOUNTER — TELEPHONE (OUTPATIENT)
Dept: NEPHROLOGY | Facility: CLINIC | Age: 23
End: 2023-06-26

## 2023-07-13 DIAGNOSIS — N20.0 NEPHROLITHIASIS: Primary | ICD-10-CM

## 2023-07-18 DIAGNOSIS — J45.40 MODERATE PERSISTENT ASTHMA WITHOUT COMPLICATION: ICD-10-CM

## 2023-07-31 RX ORDER — ALBUTEROL SULFATE 90 UG/1
2 AEROSOL, METERED RESPIRATORY (INHALATION) EVERY 6 HOURS PRN
Qty: 18 G | Refills: 1 | Status: SHIPPED | OUTPATIENT
Start: 2023-07-31

## 2023-07-31 NOTE — TELEPHONE ENCOUNTER
Pt returned call, she is scheduled for 9/11 in [facility] with mEilio Osman, she asked if we could still send in a script to last her until the visit.  Please advise

## 2023-08-16 ENCOUNTER — OFFICE VISIT (OUTPATIENT)
Dept: URGENT CARE | Facility: CLINIC | Age: 23
End: 2023-08-16
Payer: COMMERCIAL

## 2023-08-16 VITALS
DIASTOLIC BLOOD PRESSURE: 60 MMHG | OXYGEN SATURATION: 100 % | HEART RATE: 97 BPM | SYSTOLIC BLOOD PRESSURE: 118 MMHG | TEMPERATURE: 98 F | RESPIRATION RATE: 16 BRPM

## 2023-08-16 DIAGNOSIS — R82.90 ABNORMAL URINALYSIS: ICD-10-CM

## 2023-08-16 DIAGNOSIS — R30.0 DYSURIA: ICD-10-CM

## 2023-08-16 DIAGNOSIS — J02.9 SORE THROAT: ICD-10-CM

## 2023-08-16 DIAGNOSIS — J02.9 VIRAL PHARYNGITIS: Primary | ICD-10-CM

## 2023-08-16 DIAGNOSIS — R10.9 RIGHT FLANK DISCOMFORT: ICD-10-CM

## 2023-08-16 LAB
BACTERIA UR QL AUTO: ABNORMAL /HPF
BILIRUB UR QL STRIP: NEGATIVE
CLARITY UR: CLEAR
COLOR UR: YELLOW
GLUCOSE UR STRIP-MCNC: NEGATIVE MG/DL
HGB UR QL STRIP.AUTO: NEGATIVE
KETONES UR STRIP-MCNC: NEGATIVE MG/DL
LEUKOCYTE ESTERASE UR QL STRIP: NEGATIVE
MUCOUS THREADS UR QL AUTO: ABNORMAL
NITRITE UR QL STRIP: NEGATIVE
NON-SQ EPI CELLS URNS QL MICRO: ABNORMAL /HPF
PH UR STRIP.AUTO: 7.5 [PH]
PROT UR STRIP-MCNC: ABNORMAL MG/DL
RBC #/AREA URNS AUTO: ABNORMAL /HPF
S PYO AG THROAT QL: NEGATIVE
SL AMB  POCT GLUCOSE, UA: NEGATIVE
SL AMB LEUKOCYTE ESTERASE,UA: NEGATIVE
SL AMB POCT BILIRUBIN,UA: NEGATIVE
SL AMB POCT BLOOD,UA: ABNORMAL
SL AMB POCT CLARITY,UA: ABNORMAL
SL AMB POCT COLOR,UA: ABNORMAL
SL AMB POCT KETONES,UA: NEGATIVE
SL AMB POCT NITRITE,UA: NEGATIVE
SL AMB POCT PH,UA: 7.5
SL AMB POCT SPECIFIC GRAVITY,UA: 1.01
SL AMB POCT URINE PROTEIN: ABNORMAL
SL AMB POCT UROBILINOGEN: 0.2
SP GR UR STRIP.AUTO: 1.02 (ref 1–1.03)
UROBILINOGEN UR STRIP-ACNC: <2 MG/DL
WBC #/AREA URNS AUTO: ABNORMAL /HPF

## 2023-08-16 PROCEDURE — 87880 STREP A ASSAY W/OPTIC: CPT | Performed by: STUDENT IN AN ORGANIZED HEALTH CARE EDUCATION/TRAINING PROGRAM

## 2023-08-16 PROCEDURE — 81001 URINALYSIS AUTO W/SCOPE: CPT | Performed by: STUDENT IN AN ORGANIZED HEALTH CARE EDUCATION/TRAINING PROGRAM

## 2023-08-16 PROCEDURE — 99213 OFFICE O/P EST LOW 20 MIN: CPT | Performed by: STUDENT IN AN ORGANIZED HEALTH CARE EDUCATION/TRAINING PROGRAM

## 2023-08-16 PROCEDURE — 81002 URINALYSIS NONAUTO W/O SCOPE: CPT | Performed by: STUDENT IN AN ORGANIZED HEALTH CARE EDUCATION/TRAINING PROGRAM

## 2023-08-16 PROCEDURE — 87086 URINE CULTURE/COLONY COUNT: CPT | Performed by: STUDENT IN AN ORGANIZED HEALTH CARE EDUCATION/TRAINING PROGRAM

## 2023-08-16 NOTE — PROGRESS NOTES
[facility]        NAME: Omar Clarke is a 23 y.o. female  : 2000    MRN: [id number]  DATE: 2023  TIME: 1:27 PM    Assessment and Plan   Viral pharyngitis [J02.9]  1. Viral pharyngitis        2. Sore throat  POCT rapid strepA      3. Dysuria  POCT urine dip    Urine culture      4. Abnormal urinalysis  Urinalysis with microscopic      5. Right flank discomfort          URI symptoms consistent with viral pharyngitis as erythema in oropharynx, presence of cough, no fever, no tonsillar exudates. Discussed symptomatic management. Pt has mild urinary symptoms without suprapubic tenderness but has mild R flank pain without radiation. Has hx of nephrolithiasis, recently in may 2023. UA shows blood and protein. Low suspicion for UTI or pyelonephritis, will confirm with culture due to presence of blood. Need to consider nephrolithiasis as a differential given R flank pain with blood in urine though not high suspicion at this time as pt has no GI symptoms such as nausea, vomiting- will confirm hematuria with microscopy. Instructed pt to hydrate adequately, at least 60oz water daily. If pain worsens with radiation, onset of fever, should head to ER for evaluation immediately. Pt also has appt with nephrology in 3-4 weeks so I have instructed pt to follow up with them regarding urinary findings today if everything is unchanged till then.        Results for orders placed or performed in visit on 23   POCT rapid strepA   Result Value Ref Range     RAPID STREP A Negative Negative   POCT urine dip   Result Value Ref Range    LEUKOCYTE ESTERASE,UA negative     NITRITE,UA negative     SL AMB POCT UROBILINOGEN 0.2     POCT URINE PROTEIN trace      PH,UA 7.5     BLOOD,UA Nonhemolyzed: moderate     SPECIFIC GRAVITY,UA 1.010     KETONES,UA negative     BILIRUBIN,UA negative     GLUCOSE, UA negative      COLOR,UA padmini     CLARITY,UA cloudy        Patient Instructions       Follow up with PCP in 3-5 days. Proceed to  ER if symptoms worsen. Chief Complaint     Chief Complaint   Patient presents with   • Sore Throat   • Cough     Started 2 days ago with sore throat, coughing, urine frequency, and burning with urination  OTC ibuprofen     • Possible UTI         History of Present Illness       HPI     Pt presents to clinic today for health concerns. Reports onset of sore throat 2-3 days ago with worsening over the last few hours. Does report some discomfort with swallowing. Reports cough, nasal congestion  Denies rhinorrhea, ear pain or discharge, eye pain or discharge, fever, chills, myalgias, arthralgias, mastoid tenderness. Reports mom is sick and niece visiting few days ago was also getting some URI type symptoms. Reports she was camping over the weekend and starting feeling ill 24 hours later. Reports urine is appearing cloudy, increased frequency, mild dysuria. Denies hematuria, suprapubic tenderness. Reports R flank pain. Review of Systems   Review of Systems   Constitutional: Positive for fatigue. Negative for chills and fever. HENT: Positive for congestion and sore throat. Negative for ear pain. Eyes: Negative for pain and visual disturbance. Respiratory: Positive for cough. Negative for chest tightness and shortness of breath. Cardiovascular: Negative for chest pain and palpitations. Gastrointestinal: Negative for abdominal pain, constipation, diarrhea, nausea and vomiting. Genitourinary: Negative for dysuria, hematuria and menstrual problem. Musculoskeletal: Negative for arthralgias and back pain. Skin: Negative for color change and rash. Neurological: Negative for seizures and syncope. Psychiatric/Behavioral: Negative for dysphoric mood and suicidal ideas. All other systems reviewed and are negative.         Current Medications       Current Outpatient Medications:   •  albuterol (PROVENTIL HFA,VENTOLIN HFA) 90 mcg/act inhaler, Inhale 2 puffs every 6 (six) hours as needed for wheezing, Disp: 18 g, Rfl: 1  •  busPIRone (BUSPAR) 7.5 mg tablet, Take 5 mg by mouth 2 (two) times a day, Disp: , Rfl:   •  fluticasone (FLONASE) 50 mcg/act nasal spray, 1 spray into each nostril daily, Disp: 15.8 mL, Rfl: 5  •  methenamine hippurate (HIPREX) 1 g tablet, Take 1 tablet (1 g total) by mouth 2 (two) times a day with meals, Disp: 30 tablet, Rfl: 3  •  Multiple Vitamin (Multi-Day) TABS, Take 1 tablet by mouth, Disp: , Rfl:   •  omeprazole (PriLOSEC) 40 MG capsule, Take 1 capsule (40 mg total) by mouth daily, Disp: 30 capsule, Rfl: 5  •  potassium citrate (UROCIT-K) 10 mEq, Take 1 tablet (10 mEq total) by mouth 3 (three) times a day with meals, Disp: 90 tablet, Rfl: 3  •  Probiotic Product (Misc Intestinal Vikki Regulat) CAPS, Take by mouth, Disp: , Rfl:   •  promethazine (PHENERGAN) 12.5 MG tablet, Take 1 tablet (12.5 mg total) by mouth every 6 (six) hours as needed for nausea or vomiting, Disp: 30 tablet, Rfl: 0  •  Symbicort 160-4.5 MCG/ACT inhaler, Inhale 2 puffs 2 (two) times a day Rinse mouth after use., Disp: 30.6 g, Rfl: 5  •  Zofran ODT 4 MG disintegrating tablet, Take 1 tablet (4 mg total) by mouth every 6 (six) hours as needed for nausea or vomiting, Disp: 20 tablet, Rfl: 0  •  ascorbic acid (VITAMIN C) 500 MG tablet, Take 500 mg by mouth (Patient not taking: Reported on 6/15/2023), Disp: , Rfl:   •  bisacodyl (DULCOLAX) 5 mg EC tablet, Take 2 tablets (10 mg total) by mouth in the morning for 14 doses Colonoscopy prep, Disp: 28 tablet, Rfl: 0  •  ketorolac (TORADOL) 10 mg tablet, Take 1 tablet (10 mg total) by mouth every 6 (six) hours as needed for moderate pain for up to 5 days, Disp: 20 tablet, Rfl: 0  •  ketorolac (TORADOL) 10 mg tablet, Take 1 tablet (10 mg total) by mouth every 6 (six) hours as needed for moderate pain for up to 5 days, Disp: 20 tablet, Rfl: 0  •  naproxen (Naprosyn) 500 mg tablet, Take 1 tablet (500 mg total) by mouth 2 (two) times a day with meals for 5 days, Disp: 10 tablet, Rfl: 0  •  norethindrone-ethinyl estradiol (Junel 1/20) 1-20 MG-MCG per tablet, Take 1 tablet by mouth daily (Patient not taking: Reported on 6/9/2023), Disp: 84 tablet, Rfl: 1  •  oxybutynin (DITROPAN) 5 mg tablet, Take 1 tablet (5 mg total) by mouth 3 (three) times a day as needed (bladder spasm) (Patient not taking: Reported on 6/9/2023), Disp: 20 tablet, Rfl: 0  •  oxybutynin (DITROPAN) 5 mg tablet, Take 1 tablet (5 mg total) by mouth 3 (three) times a day as needed (bladder spasm) (Patient not taking: Reported on 6/9/2023), Disp: 20 tablet, Rfl: 0  •  oxyCODONE (ROXICODONE) 5 immediate release tablet, Take 1 tablet (5 mg total) by mouth every 6 (six) hours as needed for moderate pain for up to 10 doses Max Daily Amount: 20 mg (Patient not taking: Reported on 6/9/2023), Disp: 10 tablet, Rfl: 0  •  oxyCODONE (ROXICODONE) 5 immediate release tablet, Take 1 tablet (5 mg total) by mouth every 6 (six) hours as needed for moderate pain for up to 10 doses Max Daily Amount: 20 mg (Patient not taking: Reported on 6/9/2023), Disp: 10 tablet, Rfl: 0  •  phenazopyridine (PYRIDIUM) 200 mg tablet, Take 1 tablet (200 mg total) by mouth 3 (three) times a day as needed for bladder spasms (Patient not taking: Reported on 6/9/2023), Disp: 30 tablet, Rfl: 0  •  phenazopyridine (PYRIDIUM) 200 mg tablet, Take 1 tablet (200 mg total) by mouth 3 (three) times a day as needed for bladder spasms (Patient not taking: Reported on 6/9/2023), Disp: 30 tablet, Rfl: 0  •  sucralfate (CARAFATE) 1 g tablet, Take 1 tablet (1 g total) by mouth 4 (four) times a day for 12 doses, Disp: 12 tablet, Rfl: 0    Current Allergies     Allergies as of 08/16/2023   • (No Known Allergies)            The following portions of the patient's history were reviewed and updated as appropriate: allergies, current medications, past family history, past medical history, past social history, past surgical history and problem list.     Past Medical History:   Diagnosis Date   • Exercise-induced asthma    • Kidney stone    • Ovarian cyst        Past Surgical History:   Procedure Laterality Date   • APPENDECTOMY     • WA CYSTO/URETERO W/LITHOTRIPSY &INDWELL STENT INSRT Right 5/29/2023    Procedure: CYSTOSCOPY URETEROSCOPY WITH LITHOTRIPSY HOLMIUM LASER,  INSERTION STENT URETERAL;  Surgeon: Cole Leon MD;  Location: [facility];  Service: Urology   • UPPER GASTROINTESTINAL ENDOSCOPY     • WISDOM TOOTH EXTRACTION     • WISDOM TOOTH EXTRACTION         Family History   Problem Relation Age of Onset   • Heart disease Maternal Grandfather          Medications have been verified. Objective   /60   Pulse 97   Temp 98 °F (36.7 °C)   Resp 16   LMP 08/01/2023   SpO2 100%        Physical Exam     Physical Exam  Constitutional:       General: She is not in acute distress. Appearance: Normal appearance. She is well-developed. HENT:      Head: Normocephalic and atraumatic. Right Ear: Tympanic membrane and ear canal normal. No drainage, swelling or tenderness. No middle ear effusion. Tympanic membrane is not erythematous. Left Ear: No drainage, swelling or tenderness. No middle ear effusion. Tympanic membrane is not erythematous. Nose: No congestion. Mouth/Throat:      Mouth: Mucous membranes are moist.      Pharynx: Posterior oropharyngeal erythema present. No pharyngeal swelling or oropharyngeal exudate. Tonsils: No tonsillar exudate or tonsillar abscesses. Eyes:      Extraocular Movements: Extraocular movements intact. Conjunctiva/sclera: Conjunctivae normal.   Cardiovascular:      Rate and Rhythm: Normal rate. Pulmonary:      Effort: Pulmonary effort is normal. No respiratory distress. Abdominal:      Tenderness: There is abdominal tenderness in the epigastric area. There is right CVA tenderness. There is no left CVA tenderness. Skin:     General: Skin is warm and dry.    Neurological:      General: No focal deficit present. Mental Status: She is alert and oriented to person, place, and time.    Psychiatric:         Mood and Affect: Mood normal.         Behavior: Behavior normal.

## 2023-08-17 ENCOUNTER — APPOINTMENT (EMERGENCY)
Dept: RADIOLOGY | Facility: HOSPITAL | Age: 23
End: 2023-08-17
Payer: COMMERCIAL

## 2023-08-17 ENCOUNTER — HOSPITAL ENCOUNTER (EMERGENCY)
Facility: HOSPITAL | Age: 23
Discharge: HOME | End: 2023-08-17
Attending: EMERGENCY MEDICINE
Payer: COMMERCIAL

## 2023-08-17 VITALS
TEMPERATURE: 97.6 F | SYSTOLIC BLOOD PRESSURE: 118 MMHG | HEART RATE: 85 BPM | DIASTOLIC BLOOD PRESSURE: 64 MMHG | OXYGEN SATURATION: 97 % | RESPIRATION RATE: 18 BRPM

## 2023-08-17 DIAGNOSIS — R11.0 NAUSEA: ICD-10-CM

## 2023-08-17 DIAGNOSIS — N12 PYELONEPHRITIS: Primary | ICD-10-CM

## 2023-08-17 DIAGNOSIS — N20.0 NEPHROLITHIASIS: ICD-10-CM

## 2023-08-17 DIAGNOSIS — R10.9 LEFT FLANK PAIN: ICD-10-CM

## 2023-08-17 LAB
ALBUMIN SERPL BCP-MCNC: 3.9 G/DL (ref 3.5–5)
ALP SERPL-CCNC: 91 U/L (ref 46–116)
ALT SERPL W P-5'-P-CCNC: 13 U/L (ref 12–78)
ANION GAP SERPL CALCULATED.3IONS-SCNC: 5 MMOL/L
AST SERPL W P-5'-P-CCNC: 17 U/L (ref 5–45)
BACTERIA UR CULT: NORMAL
BACTERIA UR QL AUTO: ABNORMAL /HPF
BASOPHILS # BLD AUTO: 0.06 THOUSANDS/ÂΜL (ref 0–0.1)
BASOPHILS NFR BLD AUTO: 1 % (ref 0–1)
BILIRUB SERPL-MCNC: 0.29 MG/DL (ref 0.2–1)
BILIRUB UR QL STRIP: NEGATIVE
BUN SERPL-MCNC: 8 MG/DL (ref 5–25)
CALCIUM SERPL-MCNC: 9.2 MG/DL (ref 8.3–10.1)
CHLORIDE SERPL-SCNC: 107 MMOL/L (ref 96–108)
CLARITY UR: CLEAR
CO2 SERPL-SCNC: 25 MMOL/L (ref 21–32)
COLOR UR: ABNORMAL
CREAT SERPL-MCNC: 0.69 MG/DL (ref 0.6–1.3)
EOSINOPHIL # BLD AUTO: 0.56 THOUSAND/ÂΜL (ref 0–0.61)
EOSINOPHIL NFR BLD AUTO: 6 % (ref 0–6)
ERYTHROCYTE [DISTWIDTH] IN BLOOD BY AUTOMATED COUNT: 12.9 % (ref 11.6–15.1)
EXT PREGNANCY TEST URINE: NEGATIVE
EXT. CONTROL: NORMAL
GFR SERPL CREATININE-BSD FRML MDRD: 125 ML/MIN/1.73SQ M
GLUCOSE SERPL-MCNC: 91 MG/DL (ref 65–140)
GLUCOSE UR STRIP-MCNC: NEGATIVE MG/DL
HCT VFR BLD AUTO: 38 % (ref 34.8–46.1)
HGB BLD-MCNC: 12.7 G/DL (ref 11.5–15.4)
HGB UR QL STRIP.AUTO: ABNORMAL
IMM GRANULOCYTES # BLD AUTO: 0.05 THOUSAND/UL (ref 0–0.2)
IMM GRANULOCYTES NFR BLD AUTO: 1 % (ref 0–2)
KETONES UR STRIP-MCNC: NEGATIVE MG/DL
LEUKOCYTE ESTERASE UR QL STRIP: NEGATIVE
LIPASE SERPL-CCNC: 111 U/L (ref 73–393)
LYMPHOCYTES # BLD AUTO: 1.74 THOUSANDS/ÂΜL (ref 0.6–4.47)
LYMPHOCYTES NFR BLD AUTO: 17 % (ref 14–44)
MCH RBC QN AUTO: 29.1 PG (ref 26.8–34.3)
MCHC RBC AUTO-ENTMCNC: 33.4 G/DL (ref 31.4–37.4)
MCV RBC AUTO: 87 FL (ref 82–98)
MONOCYTES # BLD AUTO: 1.16 THOUSAND/ÂΜL (ref 0.17–1.22)
MONOCYTES NFR BLD AUTO: 11 % (ref 4–12)
MUCOUS THREADS UR QL AUTO: ABNORMAL
NEUTROPHILS # BLD AUTO: 6.58 THOUSANDS/ÂΜL (ref 1.85–7.62)
NEUTS SEG NFR BLD AUTO: 64 % (ref 43–75)
NITRITE UR QL STRIP: NEGATIVE
NON-SQ EPI CELLS URNS QL MICRO: ABNORMAL /HPF
NRBC BLD AUTO-RTO: 0 /100 WBCS
PH UR STRIP.AUTO: 6.5 [PH]
PLATELET # BLD AUTO: 348 THOUSANDS/UL (ref 149–390)
PMV BLD AUTO: 10.4 FL (ref 8.9–12.7)
POTASSIUM SERPL-SCNC: 3.6 MMOL/L (ref 3.5–5.3)
PROT SERPL-MCNC: 7.9 G/DL (ref 6.4–8.4)
PROT UR STRIP-MCNC: ABNORMAL MG/DL
RBC # BLD AUTO: 4.36 MILLION/UL (ref 3.81–5.12)
RBC #/AREA URNS AUTO: ABNORMAL /HPF
SODIUM SERPL-SCNC: 137 MMOL/L (ref 135–147)
SP GR UR STRIP.AUTO: 1.02 (ref 1–1.03)
UROBILINOGEN UR STRIP-ACNC: <2 MG/DL
WBC # BLD AUTO: 10.15 THOUSAND/UL (ref 4.31–10.16)
WBC #/AREA URNS AUTO: ABNORMAL /HPF

## 2023-08-17 PROCEDURE — G1004 CDSM NDSC: HCPCS

## 2023-08-17 PROCEDURE — 99285 EMERGENCY DEPT VISIT HI MDM: CPT | Performed by: EMERGENCY MEDICINE

## 2023-08-17 PROCEDURE — 80053 COMPREHEN METABOLIC PANEL: CPT | Performed by: EMERGENCY MEDICINE

## 2023-08-17 PROCEDURE — 96365 THER/PROPH/DIAG IV INF INIT: CPT

## 2023-08-17 PROCEDURE — 36415 COLL VENOUS BLD VENIPUNCTURE: CPT

## 2023-08-17 PROCEDURE — 96375 TX/PRO/DX INJ NEW DRUG ADDON: CPT

## 2023-08-17 PROCEDURE — 81001 URINALYSIS AUTO W/SCOPE: CPT | Performed by: EMERGENCY MEDICINE

## 2023-08-17 PROCEDURE — 85025 COMPLETE CBC W/AUTO DIFF WBC: CPT | Performed by: EMERGENCY MEDICINE

## 2023-08-17 PROCEDURE — 81025 URINE PREGNANCY TEST: CPT | Performed by: EMERGENCY MEDICINE

## 2023-08-17 PROCEDURE — 99284 EMERGENCY DEPT VISIT MOD MDM: CPT

## 2023-08-17 PROCEDURE — 83690 ASSAY OF LIPASE: CPT | Performed by: EMERGENCY MEDICINE

## 2023-08-17 PROCEDURE — 96376 TX/PRO/DX INJ SAME DRUG ADON: CPT

## 2023-08-17 PROCEDURE — 74176 CT ABD & PELVIS W/O CONTRAST: CPT

## 2023-08-17 RX ORDER — ONDANSETRON 4 MG/1
4 TABLET, FILM COATED ORAL EVERY 6 HOURS
Qty: 12 TABLET | Refills: 0 | Status: SHIPPED | OUTPATIENT
Start: 2023-08-17 | End: 2023-09-05 | Stop reason: SDUPTHER

## 2023-08-17 RX ORDER — ONDANSETRON 2 MG/ML
INJECTION INTRAMUSCULAR; INTRAVENOUS
Status: COMPLETED
Start: 2023-08-17 | End: 2023-08-17

## 2023-08-17 RX ORDER — HYDROMORPHONE HCL/PF 1 MG/ML
1 SYRINGE (ML) INJECTION ONCE
Status: COMPLETED | OUTPATIENT
Start: 2023-08-17 | End: 2023-08-17

## 2023-08-17 RX ORDER — CEPHALEXIN 500 MG/1
500 CAPSULE ORAL EVERY 6 HOURS SCHEDULED
Qty: 40 CAPSULE | Refills: 0 | Status: SHIPPED | OUTPATIENT
Start: 2023-08-17 | End: 2023-08-27

## 2023-08-17 RX ORDER — ONDANSETRON 4 MG/1
4 TABLET, ORALLY DISINTEGRATING ORAL ONCE
Status: DISCONTINUED | OUTPATIENT
Start: 2023-08-17 | End: 2023-08-17 | Stop reason: HOSPADM

## 2023-08-17 RX ORDER — ONDANSETRON 2 MG/ML
4 INJECTION INTRAMUSCULAR; INTRAVENOUS ONCE
Status: COMPLETED | OUTPATIENT
Start: 2023-08-17 | End: 2023-08-17

## 2023-08-17 RX ADMIN — CEFTRIAXONE SODIUM 1000 MG: 10 INJECTION, POWDER, FOR SOLUTION INTRAVENOUS at 20:01

## 2023-08-17 RX ADMIN — ONDANSETRON 4 MG: 2 INJECTION INTRAMUSCULAR; INTRAVENOUS at 20:25

## 2023-08-17 RX ADMIN — ONDANSETRON 4 MG: 2 INJECTION INTRAMUSCULAR; INTRAVENOUS at 18:10

## 2023-08-17 RX ADMIN — HYDROMORPHONE HYDROCHLORIDE 1 MG: 1 INJECTION, SOLUTION INTRAMUSCULAR; INTRAVENOUS; SUBCUTANEOUS at 19:57

## 2023-08-17 RX ADMIN — HYDROMORPHONE HYDROCHLORIDE 1 MG: 1 INJECTION, SOLUTION INTRAMUSCULAR; INTRAVENOUS; SUBCUTANEOUS at 18:33

## 2023-08-17 NOTE — ED PROVIDER NOTES
History  Chief Complaint   Patient presents with   • Flank Pain     Hx of kidney stones and recent stint. 2 hours ago, pt reports sharp stabbing pain in right pelvis and left flank and terrible stomach ache with nausea     46-year-old female with history of bilateral renal stones presents to the emergency department today for cute onset left flank pain. Patient suspect she is having the renal stone passing. She states last time she had a scan done they documented several stones in each kidney. She had a urethral stent back in May during her last kidney stone which was removed after a week. She states she has nausea and a few episodes of vomiting but denies fever, chills, shortness of breath, chest pain, blood in the urine, pain with urination. She states that she is unable to see blood in the urine but did have a UA done at the urgent care and told her that there was microscopic blood. She states he did not give her antibiotics or any pain medicine and she came here afterward due to the pain. Prior to Admission Medications   Prescriptions Last Dose Informant Patient Reported? Taking? Multiple Vitamin (Multi-Day) TABS  Self Yes No   Sig: Take 1 tablet by mouth   Probiotic Product (Misc Intestinal Vikki Regulat) CAPS  Self Yes No   Sig: Take by mouth   Symbicort 160-4.5 MCG/ACT inhaler  Self No No   Sig: Inhale 2 puffs 2 (two) times a day Rinse mouth after use.    Zofran ODT 4 MG disintegrating tablet  Self No No   Sig: Take 1 tablet (4 mg total) by mouth every 6 (six) hours as needed for nausea or vomiting   albuterol (PROVENTIL HFA,VENTOLIN HFA) 90 mcg/act inhaler   No No   Sig: Inhale 2 puffs every 6 (six) hours as needed for wheezing   ascorbic acid (VITAMIN C) 500 MG tablet  Self Yes No   Sig: Take 500 mg by mouth   Patient not taking: Reported on 6/15/2023   bisacodyl (DULCOLAX) 5 mg EC tablet   No No   Sig: Take 2 tablets (10 mg total) by mouth in the morning for 14 doses Colonoscopy prep busPIRone (BUSPAR) 7.5 mg tablet  Self Yes No   Sig: Take 5 mg by mouth 2 (two) times a day   fluticasone (FLONASE) 50 mcg/act nasal spray  Self No No   Si spray into each nostril daily   ketorolac (TORADOL) 10 mg tablet   No No   Sig: Take 1 tablet (10 mg total) by mouth every 6 (six) hours as needed for moderate pain for up to 5 days   ketorolac (TORADOL) 10 mg tablet   No No   Sig: Take 1 tablet (10 mg total) by mouth every 6 (six) hours as needed for moderate pain for up to 5 days   methenamine hippurate (HIPREX) 1 g tablet   No No   Sig: Take 1 tablet (1 g total) by mouth 2 (two) times a day with meals   naproxen (Naprosyn) 500 mg tablet   No No   Sig: Take 1 tablet (500 mg total) by mouth 2 (two) times a day with meals for 5 days   norethindrone-ethinyl estradiol (Junel ) 1-20 MG-MCG per tablet  Self No No   Sig: Take 1 tablet by mouth daily   Patient not taking: Reported on 2023   omeprazole (PriLOSEC) 40 MG capsule  Self No No   Sig: Take 1 capsule (40 mg total) by mouth daily   oxyCODONE (ROXICODONE) 5 immediate release tablet  Self No No   Sig: Take 1 tablet (5 mg total) by mouth every 6 (six) hours as needed for moderate pain for up to 10 doses Max Daily Amount: 20 mg   Patient not taking: Reported on 2023   oxyCODONE (ROXICODONE) 5 immediate release tablet  Self No No   Sig: Take 1 tablet (5 mg total) by mouth every 6 (six) hours as needed for moderate pain for up to 10 doses Max Daily Amount: 20 mg   Patient not taking: Reported on 2023   oxybutynin (DITROPAN) 5 mg tablet  Self No No   Sig: Take 1 tablet (5 mg total) by mouth 3 (three) times a day as needed (bladder spasm)   Patient not taking: Reported on 2023   oxybutynin (DITROPAN) 5 mg tablet  Self No No   Sig: Take 1 tablet (5 mg total) by mouth 3 (three) times a day as needed (bladder spasm)   Patient not taking: Reported on 2023   phenazopyridine (PYRIDIUM) 200 mg tablet  Self No No   Sig: Take 1 tablet (200 mg total) by mouth 3 (three) times a day as needed for bladder spasms   Patient not taking: Reported on 6/9/2023   phenazopyridine (PYRIDIUM) 200 mg tablet  Self No No   Sig: Take 1 tablet (200 mg total) by mouth 3 (three) times a day as needed for bladder spasms   Patient not taking: Reported on 6/9/2023   potassium citrate (UROCIT-K) 10 mEq   No No   Sig: Take 1 tablet (10 mEq total) by mouth 3 (three) times a day with meals   promethazine (PHENERGAN) 12.5 MG tablet  Self No No   Sig: Take 1 tablet (12.5 mg total) by mouth every 6 (six) hours as needed for nausea or vomiting   sucralfate (CARAFATE) 1 g tablet  Self No No   Sig: Take 1 tablet (1 g total) by mouth 4 (four) times a day for 12 doses      Facility-Administered Medications: None       Past Medical History:   Diagnosis Date   • Exercise-induced asthma    • Kidney stone    • Ovarian cyst        Past Surgical History:   Procedure Laterality Date   • APPENDECTOMY     • ID CYSTO/URETERO W/LITHOTRIPSY &INDWELL STENT INSRT Right 5/29/2023    Procedure: CYSTOSCOPY URETEROSCOPY WITH LITHOTRIPSY HOLMIUM LASER,  INSERTION STENT URETERAL;  Surgeon: Gilford Lory, MD;  Location: [facility];  Service: Urology   • UPPER GASTROINTESTINAL ENDOSCOPY     • WISDOM TOOTH EXTRACTION     • WISDOM TOOTH EXTRACTION         Family History   Problem Relation Age of Onset   • Heart disease Maternal Grandfather      I have reviewed and agree with the history as documented. E-Cigarette/Vaping   • E-Cigarette Use Never User      E-Cigarette/Vaping Substances   • Nicotine No    • THC No    • CBD No    • Flavoring No    • Other No    • Unknown No      Social History     Tobacco Use   • Smoking status: Never   • Smokeless tobacco: Never   Vaping Use   • Vaping Use: Never used   Substance Use Topics   • Alcohol use: Yes     Comment: social    • Drug use: Never        Review of Systems   Constitutional: Negative for appetite change, chills, fatigue and fever.    Eyes: Negative for visual disturbance. Respiratory: Negative for chest tightness and shortness of breath. Cardiovascular: Negative for chest pain and palpitations. Gastrointestinal: Positive for abdominal pain, nausea and vomiting. Negative for blood in stool, constipation, diarrhea and rectal pain. Genitourinary: Positive for flank pain and hematuria. Negative for dysuria, frequency, pelvic pain and urgency. Musculoskeletal: Negative for back pain and myalgias. Skin: Negative for rash and wound. Physical Exam  ED Triage Vitals   Temperature Pulse Respirations Blood Pressure SpO2   08/17/23 1744 08/17/23 1744 08/17/23 1744 08/17/23 1744 08/17/23 1744   97.6 °F (36.4 °C) (!) 112 22 144/96 98 %      Temp Source Heart Rate Source Patient Position - Orthostatic VS BP Location FiO2 (%)   08/17/23 1744 08/17/23 1744 08/17/23 1950 08/17/23 1744 --   Oral Monitor Lying Left arm       Pain Score       08/17/23 1744       7             Orthostatic Vital Signs  Vitals:    08/17/23 1744 08/17/23 1950   BP: 144/96 118/64   Pulse: (!) 112 85   Patient Position - Orthostatic VS:  Lying       Physical Exam  Constitutional:       General: She is in acute distress. Appearance: Normal appearance. She is not toxic-appearing or diaphoretic. HENT:      Head: Normocephalic and atraumatic. Nose: Nose normal.      Mouth/Throat:      Mouth: Mucous membranes are moist.      Pharynx: Oropharynx is clear. Cardiovascular:      Rate and Rhythm: Normal rate and regular rhythm. Pulses: Normal pulses. Heart sounds: Normal heart sounds. No murmur heard. Pulmonary:      Effort: Pulmonary effort is normal. No respiratory distress. Breath sounds: Normal breath sounds. Abdominal:      General: Abdomen is flat. Palpations: Abdomen is soft. Tenderness: There is abdominal tenderness. There is left CVA tenderness. There is no right CVA tenderness. Comments: L flank TTP. LLQ and Suprapubic TTP.  No overlying rash or evidence of injury    Musculoskeletal:         General: No signs of injury. Cervical back: Normal range of motion. No rigidity. Skin:     General: Skin is warm and dry. Findings: No erythema or rash. Neurological:      Mental Status: She is alert and oriented to person, place, and time.          ED Medications  Medications   HYDROmorphone (DILAUDID) injection 1 mg (has no administration in time range)   ceftriaxone (ROCEPHIN) 1 g/50 mL in dextrose IVPB (has no administration in time range)   ondansetron (ZOFRAN) injection 4 mg (4 mg Intravenous Given 8/17/23 1810)   HYDROmorphone (DILAUDID) injection 1 mg (1 mg Intravenous Given 8/17/23 1833)       Diagnostic Studies  Results Reviewed     Procedure Component Value Units Date/Time    Urine Microscopic [[id number]]  (Abnormal) Collected: 08/17/23 1812    Lab Status: Final result Specimen: Urine, Clean Catch Updated: 08/17/23 1846     RBC, UA 20-30 /hpf      WBC, UA 1-2 /hpf      Epithelial Cells Occasional /hpf      Bacteria, UA Occasional /hpf      MUCUS THREADS Occasional    Comprehensive metabolic panel [[id number]] Collected: 08/17/23 1812    Lab Status: Final result Specimen: Blood from Arm, Right Updated: 08/17/23 1842     Sodium 137 mmol/L      Potassium 3.6 mmol/L      Chloride 107 mmol/L      CO2 25 mmol/L      ANION GAP 5 mmol/L      BUN 8 mg/dL      Creatinine 0.69 mg/dL      Glucose 91 mg/dL      Calcium 9.2 mg/dL      AST 17 U/L      ALT 13 U/L      Alkaline Phosphatase 91 U/L      Total Protein 7.9 g/dL      Albumin 3.9 g/dL      Total Bilirubin 0.29 mg/dL      eGFR 125 ml/min/1.73sq m     Narrative:      [place] guidelines for Chronic Kidney Disease (CKD):   •  Stage 1 with normal or high GFR (GFR > 90 mL/min/1.73 square meters)  •  Stage 2 Mild CKD (GFR = 60-89 mL/min/1.73 square meters)  •  Stage 3A Moderate CKD (GFR = 45-59 mL/min/1.73 square meters)  •  Stage 3B Moderate CKD (GFR = 30-44 mL/min/1.73 square meters)  •  Stage 4 Severe CKD (GFR = 15-29 mL/min/1.73 square meters)  •  Stage 5 End Stage CKD (GFR <15 mL/min/1.73 square meters)  Note: GFR calculation is accurate only with a steady state creatinine    Lipase [[id number]]  (Normal) Collected: 08/17/23 1812    Lab Status: Final result Specimen: Blood from Arm, Right Updated: 08/17/23 1842     Lipase 111 u/L     UA w Reflex to Microscopic w Reflex to Culture [[id number]]  (Abnormal) Collected: 08/17/23 1812    Lab Status: Final result Specimen: Urine, Clean Catch Updated: 08/17/23 1839     Color, UA Light Yellow     Clarity, UA Clear     Specific Gravity, UA 1.018     pH, UA 6.5     Leukocytes, UA Negative     Nitrite, UA Negative     Protein, UA Trace mg/dl      Glucose, UA Negative mg/dl      Ketones, UA Negative mg/dl      Urobilinogen, UA <2.0 mg/dl      Bilirubin, UA Negative     Occult Blood, UA Small    CBC and differential [[id number]] Collected: 08/17/23 1812    Lab Status: Final result Specimen: Blood from Arm, Right Updated: 08/17/23 1824     WBC 10.15 Thousand/uL      RBC 4.36 Million/uL      Hemoglobin 12.7 g/dL      Hematocrit 38.0 %      MCV 87 fL      MCH 29.1 pg      MCHC 33.4 g/dL      RDW 12.9 %      MPV 10.4 fL      Platelets 094 Thousands/uL      nRBC 0 /100 WBCs      Neutrophils Relative 64 %      Immat GRANS % 1 %      Lymphocytes Relative 17 %      Monocytes Relative 11 %      Eosinophils Relative 6 %      Basophils Relative 1 %      Neutrophils Absolute 6.58 Thousands/µL      Immature Grans Absolute 0.05 Thousand/uL      Lymphocytes Absolute 1.74 Thousands/µL      Monocytes Absolute 1.16 Thousand/µL      Eosinophils Absolute 0.56 Thousand/µL      Basophils Absolute 0.06 Thousands/µL     POCT pregnancy, urine [[id number]]  (Normal) Resulted: 08/17/23 1816    Lab Status: Final result Specimen: Urine Updated: 08/17/23 1816     EXT Preg Test, Ur Negative     Control Valid                 CT renal stone study abdomen pelvis wo contrast   Final Result by Kerri Lao MD (08/17 1936)      Bilateral nonobstructing intrarenal calculi as described. No hydronephrosis. Workstation performed: [id number]               Procedures  Procedures      ED Course         CRAFFT    Flowsheet Row Most Recent Value   OSIRIS Initial Screen: During the past 12 months, did you:    1. Drink any alcohol (more than a few sips)? No Filed at: 08/17/2023 0338   2. Smoke any marijuana or hashish No Filed at: 08/17/2023 1748   3. Use anything else to get high? ("anything else" includes illegal drugs, over the counter and prescription drugs, and things that you sniff or 'sarah')? No Filed at: 08/17/2023 [address] [facility]                                    Medical Decision Making  62-year-old female with history of bilateral nephrolithiasis presents emergency department for pain that feels like she is passing kidney stone. Pain is primarily in the left flank and wraps around to the left pubic area. Recent UA shows hematuria. Patient is in significant pain will be given Dilaudid for pain control. CT scan with stone protocol ordered. Patient stable at this time  Upon reassessment patient responded well to pain medication. CT pending. CT scan confirmed presence of nephrolithiasis with no acute ureterolithiasis or distal obstructing stone. No hydro-. No ovarian torsion or cyst noted on CT scan. Given UA results suspect the patient has UTI and possibly pyelonephritis given history and exam finding of left flank pain. We will treat for pyelonephritis with 1 g Rocephin and outpatient prescription for Keflex 500 mg 4 times daily. Patient has been on medication for she is aware of the required compliance with this medication. Clear emergency room precautions were discussed with the patient which she verbalized understanding. Patient is stable for discharge to home. Amount and/or Complexity of Data Reviewed  Labs: ordered. Radiology: ordered.       Risk  Prescription drug management. Disposition  Final diagnoses:   Pyelonephritis   Left flank pain   Nephrolithiasis     Time reflects when diagnosis was documented in both MDM as applicable and the Disposition within this note     Time User Action Codes Description Comment    8/17/2023  7:42 PM Sailaja Crowe Add [N12] Pyelonephritis     8/17/2023  7:42 PM Sailaja Crowe Add [R10.9] Left flank pain     8/17/2023  7:42 PM Sailaja Crowe Add [N20.0] Kidney stone     8/17/2023  7:42 PM Sailaja Crowe Add [N20.0] Nephrolithiasis     8/17/2023  7:42 PM Laura Flight [N20.0] Kidney stone       ED Disposition     ED Disposition   Discharge    Condition   Stable    Date/Time   u Aug 17, 2023  7:42 PM    Comment   Ricardo Bassett discharge to home/self care. Follow-up Information    None         Patient's Medications   Discharge Prescriptions    CEPHALEXIN (KEFLEX) 500 MG CAPSULE    Take 1 capsule (500 mg total) by mouth every 6 (six) hours for 10 days       Start Date: 8/17/2023 End Date: 8/27/2023       Order Dose: 500 mg       Quantity: 40 capsule    Refills: 0     No discharge procedures on file. PDMP Review     None           ED Provider  Attending physically available and evaluated Ricardo Bassett. I managed the patient along with the ED Attending.     Electronically Signed by         Sailaja Crowe MD  08/17/23 7007

## 2023-09-01 NOTE — ED ATTENDING ATTESTATION
8/17/2023  I, Corazon Ojeda DO, saw and evaluated the patient. I have discussed the patient with the resident/non-physician practitioner and agree with the resident's/non-physician practitioner's findings, Plan of Care, and MDM as documented in the resident's/non-physician practitioner's note, except where noted. All available labs and Radiology studies were reviewed. I was present for key portions of any procedure(s) performed by the resident/non-physician practitioner and I was immediately available to provide assistance. At this point I agree with the current assessment done in the Emergency Department. I have conducted an independent evaluation of this patient a history and physical is as follows:    23year old female with flank pain. Hx kidney stones. Vomiting, without fever. No hematuria. No vaginal bleeding or dc. Looks well on exam but uncomfortable.  Plan ct renal study, labs, pain control, diff dx kidney stone vs pyelo        ED Course         Critical Care Time  Procedures

## 2023-09-04 ENCOUNTER — HOSPITAL ENCOUNTER (EMERGENCY)
Facility: HOSPITAL | Age: 23
Discharge: HOME | End: 2023-09-05
Attending: EMERGENCY MEDICINE | Admitting: EMERGENCY MEDICINE
Payer: COMMERCIAL

## 2023-09-04 ENCOUNTER — APPOINTMENT (EMERGENCY)
Dept: RADIOLOGY | Facility: HOSPITAL | Age: 23
End: 2023-09-04
Payer: COMMERCIAL

## 2023-09-04 DIAGNOSIS — N12 PYELONEPHRITIS: ICD-10-CM

## 2023-09-04 DIAGNOSIS — R10.9 LEFT FLANK PAIN: ICD-10-CM

## 2023-09-04 DIAGNOSIS — R31.9 HEMATURIA: Primary | ICD-10-CM

## 2023-09-04 DIAGNOSIS — N20.0 RECURRENT NEPHROLITHIASIS: ICD-10-CM

## 2023-09-04 DIAGNOSIS — R10.9 ABDOMINAL PAIN: ICD-10-CM

## 2023-09-04 LAB
ALBUMIN SERPL BCP-MCNC: 4.2 G/DL (ref 3.5–5)
ALP SERPL-CCNC: 77 U/L (ref 34–104)
ALT SERPL W P-5'-P-CCNC: 7 U/L (ref 7–52)
ANION GAP SERPL CALCULATED.3IONS-SCNC: 7 MMOL/L
AST SERPL W P-5'-P-CCNC: 12 U/L (ref 13–39)
BACTERIA UR QL AUTO: ABNORMAL /HPF
BASOPHILS # BLD AUTO: 0.07 THOUSANDS/ÂΜL (ref 0–0.1)
BASOPHILS NFR BLD AUTO: 1 % (ref 0–1)
BILIRUB SERPL-MCNC: 0.3 MG/DL (ref 0.2–1)
BILIRUB UR QL STRIP: NEGATIVE
BUN SERPL-MCNC: 10 MG/DL (ref 5–25)
CALCIUM SERPL-MCNC: 9.3 MG/DL (ref 8.4–10.2)
CHLORIDE SERPL-SCNC: 104 MMOL/L (ref 96–108)
CLARITY UR: ABNORMAL
CO2 SERPL-SCNC: 26 MMOL/L (ref 21–32)
COLOR UR: YELLOW
CREAT SERPL-MCNC: 0.6 MG/DL (ref 0.6–1.3)
EOSINOPHIL # BLD AUTO: 0.3 THOUSAND/ÂΜL (ref 0–0.61)
EOSINOPHIL NFR BLD AUTO: 2 % (ref 0–6)
ERYTHROCYTE [DISTWIDTH] IN BLOOD BY AUTOMATED COUNT: 12.3 % (ref 11.6–15.1)
EXT PREGNANCY TEST URINE: NEGATIVE
EXT. CONTROL: NORMAL
GFR SERPL CREATININE-BSD FRML MDRD: 130 ML/MIN/1.73SQ M
GLUCOSE SERPL-MCNC: 83 MG/DL (ref 65–140)
GLUCOSE UR STRIP-MCNC: NEGATIVE MG/DL
HCT VFR BLD AUTO: 40.3 % (ref 34.8–46.1)
HGB BLD-MCNC: 13.3 G/DL (ref 11.5–15.4)
HGB UR QL STRIP.AUTO: ABNORMAL
IMM GRANULOCYTES # BLD AUTO: 0.05 THOUSAND/UL (ref 0–0.2)
IMM GRANULOCYTES NFR BLD AUTO: 0 % (ref 0–2)
KETONES UR STRIP-MCNC: NEGATIVE MG/DL
LEUKOCYTE ESTERASE UR QL STRIP: NEGATIVE
LYMPHOCYTES # BLD AUTO: 2.49 THOUSANDS/ÂΜL (ref 0.6–4.47)
LYMPHOCYTES NFR BLD AUTO: 17 % (ref 14–44)
MCH RBC QN AUTO: 28.9 PG (ref 26.8–34.3)
MCHC RBC AUTO-ENTMCNC: 33 G/DL (ref 31.4–37.4)
MCV RBC AUTO: 87 FL (ref 82–98)
MONOCYTES # BLD AUTO: 0.95 THOUSAND/ÂΜL (ref 0.17–1.22)
MONOCYTES NFR BLD AUTO: 7 % (ref 4–12)
MUCOUS THREADS UR QL AUTO: ABNORMAL
NEUTROPHILS # BLD AUTO: 10.55 THOUSANDS/ÂΜL (ref 1.85–7.62)
NEUTS SEG NFR BLD AUTO: 73 % (ref 43–75)
NITRITE UR QL STRIP: NEGATIVE
NON-SQ EPI CELLS URNS QL MICRO: ABNORMAL /HPF
NRBC BLD AUTO-RTO: 0 /100 WBCS
PH UR STRIP.AUTO: 6 [PH] (ref 4.5–8)
PLATELET # BLD AUTO: 393 THOUSANDS/UL (ref 149–390)
PMV BLD AUTO: 10.2 FL (ref 8.9–12.7)
POTASSIUM SERPL-SCNC: 3.7 MMOL/L (ref 3.5–5.3)
PROT SERPL-MCNC: 7.3 G/DL (ref 6.4–8.4)
PROT UR STRIP-MCNC: NEGATIVE MG/DL
RBC # BLD AUTO: 4.61 MILLION/UL (ref 3.81–5.12)
RBC #/AREA URNS AUTO: ABNORMAL /HPF
SODIUM SERPL-SCNC: 137 MMOL/L (ref 135–147)
SP GR UR STRIP.AUTO: >=1.03 (ref 1–1.03)
UROBILINOGEN UR QL STRIP.AUTO: 0.2 E.U./DL
WBC # BLD AUTO: 14.41 THOUSAND/UL (ref 4.31–10.16)
WBC #/AREA URNS AUTO: ABNORMAL /HPF

## 2023-09-04 PROCEDURE — 87086 URINE CULTURE/COLONY COUNT: CPT

## 2023-09-04 PROCEDURE — 96365 THER/PROPH/DIAG IV INF INIT: CPT

## 2023-09-04 PROCEDURE — 81025 URINE PREGNANCY TEST: CPT

## 2023-09-04 PROCEDURE — 85025 COMPLETE CBC W/AUTO DIFF WBC: CPT

## 2023-09-04 PROCEDURE — 36415 COLL VENOUS BLD VENIPUNCTURE: CPT

## 2023-09-04 PROCEDURE — 99285 EMERGENCY DEPT VISIT HI MDM: CPT | Performed by: EMERGENCY MEDICINE

## 2023-09-04 PROCEDURE — 80053 COMPREHEN METABOLIC PANEL: CPT

## 2023-09-04 PROCEDURE — 96376 TX/PRO/DX INJ SAME DRUG ADON: CPT

## 2023-09-04 PROCEDURE — 99284 EMERGENCY DEPT VISIT MOD MDM: CPT

## 2023-09-04 PROCEDURE — 96375 TX/PRO/DX INJ NEW DRUG ADDON: CPT

## 2023-09-04 PROCEDURE — 76775 US EXAM ABDO BACK WALL LIM: CPT | Performed by: EMERGENCY MEDICINE

## 2023-09-04 PROCEDURE — 81001 URINALYSIS AUTO W/SCOPE: CPT

## 2023-09-04 PROCEDURE — 71046 X-RAY EXAM CHEST 2 VIEWS: CPT

## 2023-09-04 PROCEDURE — 96366 THER/PROPH/DIAG IV INF ADDON: CPT

## 2023-09-04 RX ORDER — KETOROLAC TROMETHAMINE 30 MG/ML
15 INJECTION, SOLUTION INTRAMUSCULAR; INTRAVENOUS ONCE
Status: COMPLETED | OUTPATIENT
Start: 2023-09-04 | End: 2023-09-04

## 2023-09-04 RX ORDER — HYDROMORPHONE HCL/PF 1 MG/ML
1 SYRINGE (ML) INJECTION ONCE
Status: COMPLETED | OUTPATIENT
Start: 2023-09-05 | End: 2023-09-05

## 2023-09-04 RX ORDER — HYDROMORPHONE HCL/PF 1 MG/ML
0.5 SYRINGE (ML) INJECTION ONCE
Status: COMPLETED | OUTPATIENT
Start: 2023-09-04 | End: 2023-09-04

## 2023-09-04 RX ORDER — ONDANSETRON 2 MG/ML
4 INJECTION INTRAMUSCULAR; INTRAVENOUS ONCE
Status: COMPLETED | OUTPATIENT
Start: 2023-09-04 | End: 2023-09-05

## 2023-09-04 RX ORDER — ONDANSETRON 2 MG/ML
4 INJECTION INTRAMUSCULAR; INTRAVENOUS ONCE
Status: COMPLETED | OUTPATIENT
Start: 2023-09-04 | End: 2023-09-04

## 2023-09-04 RX ADMIN — SODIUM CHLORIDE, SODIUM LACTATE, POTASSIUM CHLORIDE, AND CALCIUM CHLORIDE 1000 ML: .6; .31; .03; .02 INJECTION, SOLUTION INTRAVENOUS at 21:50

## 2023-09-04 RX ADMIN — KETOROLAC TROMETHAMINE 15 MG: 30 INJECTION, SOLUTION INTRAMUSCULAR; INTRAVENOUS at 21:49

## 2023-09-04 RX ADMIN — ONDANSETRON 4 MG: 2 INJECTION INTRAMUSCULAR; INTRAVENOUS at 21:50

## 2023-09-04 RX ADMIN — HYDROMORPHONE HYDROCHLORIDE 0.5 MG: 1 INJECTION, SOLUTION INTRAMUSCULAR; INTRAVENOUS; SUBCUTANEOUS at 22:24

## 2023-09-04 RX ADMIN — HYDROMORPHONE HYDROCHLORIDE 0.5 MG: 1 INJECTION, SOLUTION INTRAMUSCULAR; INTRAVENOUS; SUBCUTANEOUS at 23:06

## 2023-09-04 NOTE — Clinical Note
Rita Amelia was seen and treated in our emergency department on 9/4/2023. Diagnosis:     Laura Peña  may return to school on return date. She may return on this date: 09/06/2023         If you have any questions or concerns, please don't hesitate to call.       Giovanni Gan MD    ______________________________           _______________          _______________  [facility] Representative                              Date                                Time

## 2023-09-05 ENCOUNTER — APPOINTMENT (EMERGENCY)
Dept: RADIOLOGY | Facility: HOSPITAL | Age: 23
End: 2023-09-05
Payer: COMMERCIAL

## 2023-09-05 VITALS
RESPIRATION RATE: 19 BRPM | HEART RATE: 94 BPM | DIASTOLIC BLOOD PRESSURE: 67 MMHG | TEMPERATURE: 98.3 F | OXYGEN SATURATION: 98 % | SYSTOLIC BLOOD PRESSURE: 146 MMHG

## 2023-09-05 PROCEDURE — 74177 CT ABD & PELVIS W/CONTRAST: CPT

## 2023-09-05 PROCEDURE — G1004 CDSM NDSC: HCPCS

## 2023-09-05 PROCEDURE — 96376 TX/PRO/DX INJ SAME DRUG ADON: CPT

## 2023-09-05 PROCEDURE — 96375 TX/PRO/DX INJ NEW DRUG ADDON: CPT

## 2023-09-05 RX ORDER — DROPERIDOL 2.5 MG/ML
0.62 INJECTION, SOLUTION INTRAMUSCULAR; INTRAVENOUS ONCE
Status: COMPLETED | OUTPATIENT
Start: 2023-09-05 | End: 2023-09-05

## 2023-09-05 RX ORDER — ONDANSETRON 4 MG/1
4 TABLET, FILM COATED ORAL EVERY 6 HOURS
Qty: 20 TABLET | Refills: 0 | Status: SHIPPED | OUTPATIENT
Start: 2023-09-05

## 2023-09-05 RX ADMIN — IOHEXOL 100 ML: 350 INJECTION, SOLUTION INTRAVENOUS at 00:45

## 2023-09-05 RX ADMIN — ONDANSETRON 4 MG: 2 INJECTION INTRAMUSCULAR; INTRAVENOUS at 00:01

## 2023-09-05 RX ADMIN — HYDROMORPHONE HYDROCHLORIDE 1 MG: 1 INJECTION, SOLUTION INTRAMUSCULAR; INTRAVENOUS; SUBCUTANEOUS at 00:01

## 2023-09-05 RX ADMIN — DROPERIDOL 0.62 MG: 2.5 INJECTION, SOLUTION INTRAMUSCULAR; INTRAVENOUS at 01:37

## 2023-09-05 NOTE — ED PROVIDER NOTES
History  Chief Complaint   Patient presents with   • Flank Pain     Patient reports L flank pain that radiates into R lower abdomen. Thinks passing a kidney stone      21-year-old woman with relevant PMH recurrent UTIs, renal stones, and ovarian cysts presents with left flank pain and right lower abdominal pain. Around 1830 today, she started feeling intense pain of her left flank and right lower abdomen. She describes it as a stabbing sensation and feels similar to her prior renal stones. She had 1 episode of vomiting prior to arrival.  No fever or chills. She had a couple episodes of loose stools prior to arrival.  She has been having a nonproductive cough for the last couple weeks. Last menstrual period was a few days ago and normal.  She denies any history of mittelschmerz and says this feels different than her ovarian cyst pain. She has not noted any hematuria. She has a history of appendectomy. She was seen here a few weeks ago for the same and noted to have pyelonephritis. She has a few small punctate renal stones noted on that scan. She did not take anything for her pain. Prior to Admission Medications   Prescriptions Last Dose Informant Patient Reported? Taking? Multiple Vitamin (Multi-Day) TABS  Self Yes No   Sig: Take 1 tablet by mouth   Probiotic Product (Misc Intestinal Vikki Regulat) CAPS  Self Yes No   Sig: Take by mouth   Symbicort 160-4.5 MCG/ACT inhaler  Self No No   Sig: Inhale 2 puffs 2 (two) times a day Rinse mouth after use.    Zofran ODT 4 MG disintegrating tablet  Self No No   Sig: Take 1 tablet (4 mg total) by mouth every 6 (six) hours as needed for nausea or vomiting   albuterol (PROVENTIL HFA,VENTOLIN HFA) 90 mcg/act inhaler   No No   Sig: Inhale 2 puffs every 6 (six) hours as needed for wheezing   ascorbic acid (VITAMIN C) 500 MG tablet  Self Yes No   Sig: Take 500 mg by mouth   Patient not taking: Reported on 6/15/2023   bisacodyl (DULCOLAX) 5 mg EC tablet   No No Sig: Take 2 tablets (10 mg total) by mouth in the morning for 14 doses Colonoscopy prep   busPIRone (BUSPAR) 7.5 mg tablet  Self Yes No   Sig: Take 5 mg by mouth 2 (two) times a day   fluticasone (FLONASE) 50 mcg/act nasal spray  Self No No   Si spray into each nostril daily   ketorolac (TORADOL) 10 mg tablet   No No   Sig: Take 1 tablet (10 mg total) by mouth every 6 (six) hours as needed for moderate pain for up to 5 days   ketorolac (TORADOL) 10 mg tablet   No No   Sig: Take 1 tablet (10 mg total) by mouth every 6 (six) hours as needed for moderate pain for up to 5 days   methenamine hippurate (HIPREX) 1 g tablet   No No   Sig: Take 1 tablet (1 g total) by mouth 2 (two) times a day with meals   naproxen (Naprosyn) 500 mg tablet   No No   Sig: Take 1 tablet (500 mg total) by mouth 2 (two) times a day with meals for 5 days   norethindrone-ethinyl estradiol (Junel ) 1-20 MG-MCG per tablet  Self No No   Sig: Take 1 tablet by mouth daily   Patient not taking: Reported on 2023   omeprazole (PriLOSEC) 40 MG capsule  Self No No   Sig: Take 1 capsule (40 mg total) by mouth daily   ondansetron (ZOFRAN) 4 mg tablet   No No   Sig: Take 1 tablet (4 mg total) by mouth every 6 (six) hours   ondansetron (ZOFRAN) 4 mg tablet   No Yes   Sig: Take 1 tablet (4 mg total) by mouth every 6 (six) hours   oxyCODONE (ROXICODONE) 5 immediate release tablet  Self No No   Sig: Take 1 tablet (5 mg total) by mouth every 6 (six) hours as needed for moderate pain for up to 10 doses Max Daily Amount: 20 mg   Patient not taking: Reported on 2023   oxyCODONE (ROXICODONE) 5 immediate release tablet  Self No No   Sig: Take 1 tablet (5 mg total) by mouth every 6 (six) hours as needed for moderate pain for up to 10 doses Max Daily Amount: 20 mg   Patient not taking: Reported on 2023   oxybutynin (DITROPAN) 5 mg tablet  Self No No   Sig: Take 1 tablet (5 mg total) by mouth 3 (three) times a day as needed (bladder spasm)   Patient not taking: Reported on 6/9/2023   oxybutynin (DITROPAN) 5 mg tablet  Self No No   Sig: Take 1 tablet (5 mg total) by mouth 3 (three) times a day as needed (bladder spasm)   Patient not taking: Reported on 6/9/2023   phenazopyridine (PYRIDIUM) 200 mg tablet  Self No No   Sig: Take 1 tablet (200 mg total) by mouth 3 (three) times a day as needed for bladder spasms   Patient not taking: Reported on 6/9/2023   phenazopyridine (PYRIDIUM) 200 mg tablet  Self No No   Sig: Take 1 tablet (200 mg total) by mouth 3 (three) times a day as needed for bladder spasms   Patient not taking: Reported on 6/9/2023   potassium citrate (UROCIT-K) 10 mEq   No No   Sig: Take 1 tablet (10 mEq total) by mouth 3 (three) times a day with meals   promethazine (PHENERGAN) 12.5 MG tablet  Self No No   Sig: Take 1 tablet (12.5 mg total) by mouth every 6 (six) hours as needed for nausea or vomiting   sucralfate (CARAFATE) 1 g tablet  Self No No   Sig: Take 1 tablet (1 g total) by mouth 4 (four) times a day for 12 doses      Facility-Administered Medications: None       Past Medical History:   Diagnosis Date   • Exercise-induced asthma    • Kidney stone    • Ovarian cyst        Past Surgical History:   Procedure Laterality Date   • APPENDECTOMY     • MA CYSTO/URETERO W/LITHOTRIPSY &INDWELL STENT INSRT Right 5/29/2023    Procedure: CYSTOSCOPY URETEROSCOPY WITH LITHOTRIPSY HOLMIUM LASER,  INSERTION STENT URETERAL;  Surgeon: Hali Cruz MD;  Location:  MAIN OR;  Service: Urology   • UPPER GASTROINTESTINAL ENDOSCOPY     • WISDOM TOOTH EXTRACTION     • WISDOM TOOTH EXTRACTION         Family History   Problem Relation Age of Onset   • Heart disease Maternal Grandfather      I have reviewed and agree with the history as documented.     E-Cigarette/Vaping   • E-Cigarette Use Never User      E-Cigarette/Vaping Substances   • Nicotine No    • THC No    • CBD No    • Flavoring No    • Other No    • Unknown No      Social History     Tobacco Use   • Smoking status: Never   • Smokeless tobacco: Never   Vaping Use   • Vaping Use: Never used   Substance Use Topics   • Alcohol use: Yes     Comment: social    • Drug use: Never        Review of Systems   Constitutional: Negative for chills and fever. HENT: Negative for ear pain and sore throat. Eyes: Negative for pain and visual disturbance. Respiratory: Negative for cough, shortness of breath and wheezing. Cardiovascular: Negative for chest pain and palpitations. Gastrointestinal: Positive for abdominal pain, diarrhea and vomiting. Negative for constipation. Genitourinary: Positive for flank pain. Negative for dysuria, frequency, hematuria and vaginal bleeding. Musculoskeletal: Negative for arthralgias and back pain. Skin: Negative for color change and rash. Neurological: Negative for seizures, syncope and headaches. Psychiatric/Behavioral: Negative for agitation and confusion. Physical Exam  ED Triage Vitals   Temperature Pulse Respirations Blood Pressure SpO2   09/04/23 2048 09/04/23 2048 09/04/23 2048 09/04/23 2051 09/04/23 2048   97.8 °F (36.6 °C) (!) 111 20 139/90 97 %      Temp Source Heart Rate Source Patient Position - Orthostatic VS BP Location FiO2 (%)   09/04/23 2048 09/04/23 2048 09/04/23 2048 09/04/23 2048 --   Temporal Monitor Sitting Left arm       Pain Score       09/04/23 2149       6             Orthostatic Vital Signs  Vitals:    09/04/23 2048 09/04/23 2051 09/05/23 0015   BP:  139/90 146/67   Pulse: (!) 111  94   Patient Position - Orthostatic VS: Sitting  Lying       Physical Exam  Vitals and nursing note reviewed. Constitutional:       General: She is not in acute distress. Appearance: Normal appearance. She is well-developed. Comments: In no distress but lying in discomfort. HENT:      Head: Normocephalic and atraumatic. Right Ear: External ear normal.      Left Ear: External ear normal.      Nose: Nose normal. No congestion.    Cardiovascular:      Rate and Rhythm: Regular rhythm. Tachycardia present. Pulmonary:      Effort: Pulmonary effort is normal. No respiratory distress. Breath sounds: Normal breath sounds. Abdominal:      Palpations: Abdomen is soft. Tenderness: There is abdominal tenderness in the right lower quadrant. There is left CVA tenderness. There is no right CVA tenderness, guarding or rebound. Musculoskeletal:         General: Normal range of motion. Cervical back: Normal range of motion and neck supple. Skin:     General: Skin is warm and dry. Neurological:      Mental Status: She is alert and oriented to person, place, and time. Mental status is at baseline. Sensory: No sensory deficit. Motor: No weakness. Gait: Gait normal.   Psychiatric:         Mood and Affect: Mood normal.         Behavior: Behavior normal.         ED Medications  Medications   lactated ringers bolus 1,000 mL (0 mL Intravenous Stopped 9/5/23 0004)   ketorolac (TORADOL) injection 15 mg (15 mg Intravenous Given 9/4/23 2149)   ondansetron (ZOFRAN) injection 4 mg (4 mg Intravenous Given 9/4/23 2150)   HYDROmorphone (DILAUDID) injection 0.5 mg (0.5 mg Intravenous Given 9/4/23 2224)   HYDROmorphone (DILAUDID) injection 0.5 mg (0.5 mg Intravenous Given 9/4/23 2306)   ondansetron (ZOFRAN) injection 4 mg (4 mg Intravenous Given 9/5/23 0001)   HYDROmorphone (DILAUDID) injection 1 mg (1 mg Intravenous Given 9/5/23 0001)   iohexol (OMNIPAQUE) 350 MG/ML injection (SINGLE-DOSE) 100 mL (100 mL Intravenous Given 9/5/23 0045)   droperidol (INAPSINE) injection 0.625 mg (0.625 mg Intravenous Given 9/5/23 0137)       Diagnostic Studies  Results Reviewed     Procedure Component Value Units Date/Time    Urine culture [[id number]] Collected: 09/04/23 2157    Lab Status:  In process Specimen: Urine, Clean Catch Updated: 09/05/23 0040    Urine Microscopic [[id number]]  (Abnormal) Collected: 09/04/23 2154    Lab Status: Final result Specimen: Urine, Clean Catch Updated: 09/04/23 2224     RBC, UA 4-10 /hpf      WBC, UA 2-4 /hpf      Epithelial Cells Occasional /hpf      Bacteria, UA Occasional /hpf      MUCUS THREADS Occasional    Comprehensive metabolic panel [[id number]]  (Abnormal) Collected: 09/04/23 2149    Lab Status: Final result Specimen: Blood from Arm, Right Updated: 09/04/23 2224     Sodium 137 mmol/L      Potassium 3.7 mmol/L      Chloride 104 mmol/L      CO2 26 mmol/L      ANION GAP 7 mmol/L      BUN 10 mg/dL      Creatinine 0.60 mg/dL      Glucose 83 mg/dL      Calcium 9.3 mg/dL      AST 12 U/L      ALT 7 U/L      Alkaline Phosphatase 77 U/L      Total Protein 7.3 g/dL      Albumin 4.2 g/dL      Total Bilirubin 0.30 mg/dL      eGFR 130 ml/min/1.73sq m     Narrative:      [place] guidelines for Chronic Kidney Disease (CKD):   •  Stage 1 with normal or high GFR (GFR > 90 mL/min/1.73 square meters)  •  Stage 2 Mild CKD (GFR = 60-89 mL/min/1.73 square meters)  •  Stage 3A Moderate CKD (GFR = 45-59 mL/min/1.73 square meters)  •  Stage 3B Moderate CKD (GFR = 30-44 mL/min/1.73 square meters)  •  Stage 4 Severe CKD (GFR = 15-29 mL/min/1.73 square meters)  •  Stage 5 End Stage CKD (GFR <15 mL/min/1.73 square meters)  Note: GFR calculation is accurate only with a steady state creatinine    CBC and differential [[id number]]  (Abnormal) Collected: 09/04/23 2149    Lab Status: Final result Specimen: Blood from Arm, Right Updated: 09/04/23 2205     WBC 14.41 Thousand/uL      RBC 4.61 Million/uL      Hemoglobin 13.3 g/dL      Hematocrit 40.3 %      MCV 87 fL      MCH 28.9 pg      MCHC 33.0 g/dL      RDW 12.3 %      MPV 10.2 fL      Platelets 597 Thousands/uL      nRBC 0 /100 WBCs      Neutrophils Relative 73 %      Immat GRANS % 0 %      Lymphocytes Relative 17 %      Monocytes Relative 7 %      Eosinophils Relative 2 %      Basophils Relative 1 %      Neutrophils Absolute 10.55 Thousands/µL      Immature Grans Absolute 0.05 Thousand/uL Lymphocytes Absolute 2.49 Thousands/µL      Monocytes Absolute 0.95 Thousand/µL      Eosinophils Absolute 0.30 Thousand/µL      Basophils Absolute 0.07 Thousands/µL     POCT pregnancy, urine [[id number]]  (Normal) Resulted: 09/04/23 2148    Lab Status: Edited Result - FINAL Updated: 09/04/23 2158     EXT Preg Test, Ur Negative     Control Valid    Urine Macroscopic, POC [[id number]]  (Abnormal) Collected: 09/04/23 2154    Lab Status: Final result Specimen: Urine Updated: 09/04/23 2155     Color, UA Yellow     Clarity, UA Slightly Cloudy     pH, UA 6.0     Leukocytes, UA Negative     Nitrite, UA Negative     Protein, UA Negative mg/dl      Glucose, UA Negative mg/dl      Ketones, UA Negative mg/dl      Urobilinogen, UA 0.2 E.U./dl      Bilirubin, UA Negative     Occult Blood, UA Small     Specific Gravity, UA >=1.030    Narrative:      [facility] RESULT                 CT abdomen pelvis with contrast   Final Result by Yovani Shafer MD (09/05 0140)      Bilateral nonobstructive nephrolithiasis. No hydronephrosis. Workstation performed: [id number]         XR chest 2 views   ED Interpretation by Andrew Adame MD (09/04 5173)   Chest radiograph personally interpreted by me as no acute cardiopulmonary disease.               Procedures  POC Renal US    Date/Time: 9/4/2023 10:04 PM    Performed by: Andrew Adame MD  Authorized by: Andrew Adame MD    Patient location:  ED  Performed by:  Resident  Procedure details:     Exam Type:  Diagnostic    Indications: flank/back pain and abdominal pain      Assessment for:  Bladder volume and suspected hydronephrosis    Views obtained: bladder (transverse and sagittal), left kidney and right kidney      Image quality: diagnostic      Image availability:  Images available in PACS  Findings:     LEFT kidney findings: renal cyst      LEFT hydronephrosis: none      RIGHT kidney findings: renal cyst      RIGHT hydronephrosis: none      Bladder: Visualized  Interpretation:     Renal ultrasound impressions: normal exam            ED Course  ED Course as of 09/05/23 0243   Tue Sep 05, 2023   0153 As per radiology, CT scan effectively rules out ovarian torsion and does not require an US. SBIRT 22yo+    Flowsheet Row Most Recent Value   Initial Alcohol Screen: US AUDIT-C     1. How often do you have a drink containing alcohol? 1 Filed at: 09/04/2023 2050   2. How many drinks containing alcohol do you have on a typical day you are drinking? 0 Filed at: 09/04/2023 2050   3b. FEMALE Any Age, or MALE 65+: How often do you have 4 or more drinks on one occassion? 0 Filed at: 09/04/2023 2050   Audit-C Score 1 Filed at: 09/04/2023 2050   MILADY: How many times in the past year have you. .. Used an illegal drug or used a prescription medication for non-medical reasons? Never Filed at: 09/04/2023 2050          Medical Decision Making  Presents with left flank pain and right lower quadrant abdominal pain. She had episode of vomiting prior to arrival.  Patient has history of appendectomy, so unlikely for this to cause her pain. She says this feels similar to her renal stones. DDx: Ureteral stone, pyelonephritis, enteritis, pneumonia    Chest radiograph without any obvious disease. White blood cell count is elevated but nonspecific. UA does not show evidence of infection, but a urine culture was sent. No indication for antibiotics at this time. Bedside ultrasound without evidence of hydronephrosis bilaterally. While awaiting work-up, patient required multiple doses of narcotic medication. He continued to have abdominal pain, so CT scan was added on. This showed no obvious abnormalities. Ovaries appeared normal, so no ultrasound indicated for possible torsion. I then ordered droperidol as she continued to have nausea and abdominal pain. She then reported resolution of her symptoms.   Given elevated white cell count, vomiting, and diarrhea, she probably has a component of viral gastroenteritis. There may be some component of anxiety regarding her known renal stones contributing to this pain. I would recommend consideration of droperidol as an adjunct in the future for treatment of this pain. Patient in agreement with plan and questions were answered. Verbalized understanding of return precautions. Portions or all of this note were generated using voice recognition software. Occasional wrong word or "sound a like" substitutions may have occurred due to the inherent limitations of voice recognition software. Please interpret any errors within the intended context of the whole sentence or idea. Amount and/or Complexity of Data Reviewed  Labs: ordered. Radiology: ordered and independent interpretation performed. Risk  Prescription drug management. Disposition  Final diagnoses:   Hematuria   Recurrent nephrolithiasis   Abdominal pain     Time reflects when diagnosis was documented in both MDM as applicable and the Disposition within this note     Time User Action Codes Description Comment    9/4/2023 10:30 PM Terrilyn Rinne [N20.1] Ureterolithiasis     9/4/2023 10:30 PM Terrilyn Rinne [R31.9] Hematuria     9/5/2023  2:08 AM Oracio Apo Modify [R31.9] Hematuria     9/5/2023  2:08 AM Oracio Apo Remove [N20.1] Ureterolithiasis     9/5/2023  2:08 AM Oracio Apo Add [N20.0] Recurrent nephrolithiasis     9/5/2023  2:09 AM Oracio Apo Add [R10.9] Abdominal pain     9/5/2023  2:11 AM Oracio Apo Add [N12] Pyelonephritis     9/5/2023  2:11 AM Oracio Apo Add [R10.9] Left flank pain       ED Disposition     ED Disposition   Discharge    Condition   Stable    Date/Time   Mon Sep 4, 2023 10:30 PM    Comment   Casey Dewitt discharge to home/self care.                Follow-up Information     Follow up With Specialties Details Why [address], CRNP Medical Surgical, Nurse Practitioner Schedule an appointment as soon as possible for a visit in 1 week As needed 1 [facility]  [address] [address] [address] [place]            Discharge Medication List as of 9/5/2023  2:11 AM      CONTINUE these medications which have CHANGED    Details   ondansetron (ZOFRAN) 4 mg tablet Take 1 tablet (4 mg total) by mouth every 6 (six) hours, Starting Tue 9/5/2023, Normal         CONTINUE these medications which have NOT CHANGED    Details   albuterol (PROVENTIL HFA,VENTOLIN HFA) 90 mcg/act inhaler Inhale 2 puffs every 6 (six) hours as needed for wheezing, Starting Mon 7/31/2023, Normal      ascorbic acid (VITAMIN C) 500 MG tablet Take 500 mg by mouth, Historical Med      bisacodyl (DULCOLAX) 5 mg EC tablet Take 2 tablets (10 mg total) by mouth in the morning for 14 doses Colonoscopy prep, Starting Thu 11/17/2022, Until Thu 12/1/2022, Normal      busPIRone (BUSPAR) 7.5 mg tablet Take 5 mg by mouth 2 (two) times a day, Starting Mon 3/20/2023, Historical Med      fluticasone (FLONASE) 50 mcg/act nasal spray 1 spray into each nostril daily, Starting Thu 9/1/2022, Normal      ketorolac (TORADOL) 10 mg tablet Take 1 tablet (10 mg total) by mouth every 6 (six) hours as needed for moderate pain for up to 5 days, Starting Mon 5/29/2023, Until Sat 6/3/2023 at 2359, Normal      ketorolac (TORADOL) 10 mg tablet Take 1 tablet (10 mg total) by mouth every 6 (six) hours as needed for moderate pain for up to 5 days, Starting Mon 5/29/2023, Until Sat 6/3/2023 at 2359, Normal      methenamine hippurate (HIPREX) 1 g tablet Take 1 tablet (1 g total) by mouth 2 (two) times a day with meals, Starting Fri 6/9/2023, Print      Multiple Vitamin (Multi-Day) TABS Take 1 tablet by mouth, Historical Med      naproxen (Naprosyn) 500 mg tablet Take 1 tablet (500 mg total) by mouth 2 (two) times a day with meals for 5 days, Starting Tue 5/23/2023, Until Sun 5/28/2023, Normal      norethindrone-ethinyl estradiol (Junel 1/20) 1-20 MG-MCG per tablet Take 1 tablet by mouth daily, Starting Wed 11/2/2022, Normal      omeprazole (PriLOSEC) 40 MG capsule Take 1 capsule (40 mg total) by mouth daily, Starting Wed 3/29/2023, Until Mon 9/25/2023, Normal      !! oxybutynin (DITROPAN) 5 mg tablet Take 1 tablet (5 mg total) by mouth 3 (three) times a day as needed (bladder spasm), Starting Mon 5/29/2023, Normal      !! oxybutynin (DITROPAN) 5 mg tablet Take 1 tablet (5 mg total) by mouth 3 (three) times a day as needed (bladder spasm), Starting Mon 5/29/2023, Normal      !! oxyCODONE (ROXICODONE) 5 immediate release tablet Take 1 tablet (5 mg total) by mouth every 6 (six) hours as needed for moderate pain for up to 10 doses Max Daily Amount: 20 mg, Starting Mon 5/29/2023, Normal      !! oxyCODONE (ROXICODONE) 5 immediate release tablet Take 1 tablet (5 mg total) by mouth every 6 (six) hours as needed for moderate pain for up to 10 doses Max Daily Amount: 20 mg, Starting Mon 5/29/2023, Normal      !! phenazopyridine (PYRIDIUM) 200 mg tablet Take 1 tablet (200 mg total) by mouth 3 (three) times a day as needed for bladder spasms, Starting Mon 5/29/2023, Normal      !! phenazopyridine (PYRIDIUM) 200 mg tablet Take 1 tablet (200 mg total) by mouth 3 (three) times a day as needed for bladder spasms, Starting Mon 5/29/2023, Normal      potassium citrate (UROCIT-K) 10 mEq Take 1 tablet (10 mEq total) by mouth 3 (three) times a day with meals, Starting Fri 6/9/2023, Print      Probiotic Product (Misc Intestinal Vikki Regulat) CAPS Take by mouth, Historical Med      promethazine (PHENERGAN) 12.5 MG tablet Take 1 tablet (12.5 mg total) by mouth every 6 (six) hours as needed for nausea or vomiting, Starting Tue 10/25/2022, Normal      sucralfate (CARAFATE) 1 g tablet Take 1 tablet (1 g total) by mouth 4 (four) times a day for 12 doses, Starting Sun 10/23/2022, Until Wed 10/26/2022, Normal      Symbicort 160-4.5 MCG/ACT inhaler Inhale 2 puffs 2 (two) times a day Rinse mouth after use., Starting Tue 12/20/2022, Normal      Zofran ODT 4 MG disintegrating tablet Take 1 tablet (4 mg total) by mouth every 6 (six) hours as needed for nausea or vomiting, Starting Sun 10/23/2022, Normal       !! - Potential duplicate medications found. Please discuss with provider. No discharge procedures on file. PDMP Review     None           ED Provider  Attending physically available and evaluated Maurice Perez. I managed the patient along with the ED Attending.     Electronically Signed by         Heron Nissen, MD  09/05/23 Elias Gonzalez MD  09/05/23 [id number]

## 2023-09-05 NOTE — ED ATTENDING ATTESTATION
9/4/2023  I, Dina Ramos MD, saw and evaluated the patient. I have discussed the patient with the resident/non-physician practitioner and agree with the resident's/non-physician practitioner's findings, Plan of Care, and MDM as documented in the resident's/non-physician practitioner's note, except where noted. All available labs and Radiology studies were reviewed. I was present for key portions of any procedure(s) performed by the resident/non-physician practitioner and I was immediately available to provide assistance. At this point I agree with the current assessment done in the Emergency Department. I have conducted an independent evaluation of this patient a history and physical is as follows:    ED Course         Critical Care Time  Procedures    25 yo female with hx of kidney stones, ovarian cysts, here for left flank pain and right groing pain, similar to previous stones. Pt has had pyelonephritis in the past as well. No vomiting, no fever. Vss, afebrile, tachy, lungs cta, rrr, abdomen soft mild tenderness, right groin, left cva tenderness. Pain meds, urine, labs. Likely kidney stone, low suspicion for ovarian pathology.

## 2023-09-05 NOTE — DISCHARGE INSTRUCTIONS
You were seen for abdominal pain. We found no emergent causes for your symptoms. You may have a virus causing your abdominal pain. You can take 975 mg acetaminophen (brand name includes Tylenol) and 400 mg ibuprofen (brand names include Advil or Motrin) every 6 hours for pain/fever. Apply a heating pad to the area of pain as needed. We did send a urine culture and will call you if anything grows.  the nausea medicine if needed.

## 2023-09-06 LAB — BACTERIA UR CULT: NORMAL

## 2023-09-11 ENCOUNTER — OFFICE VISIT (OUTPATIENT)
Dept: PULMONOLOGY | Facility: CLINIC | Age: 23
End: 2023-09-11
Payer: COMMERCIAL

## 2023-09-11 VITALS
HEIGHT: 70 IN | OXYGEN SATURATION: 98 % | DIASTOLIC BLOOD PRESSURE: 70 MMHG | HEART RATE: 88 BPM | WEIGHT: 236 LBS | SYSTOLIC BLOOD PRESSURE: 118 MMHG | BODY MASS INDEX: 33.79 KG/M2

## 2023-09-11 DIAGNOSIS — J20.9 ACUTE BRONCHITIS, UNSPECIFIED ORGANISM: ICD-10-CM

## 2023-09-11 DIAGNOSIS — E66.09 CLASS 1 OBESITY DUE TO EXCESS CALORIES WITHOUT SERIOUS COMORBIDITY WITH BODY MASS INDEX (BMI) OF 33.0 TO 33.9 IN ADULT: ICD-10-CM

## 2023-09-11 DIAGNOSIS — J45.40 MODERATE PERSISTENT ASTHMA WITHOUT COMPLICATION: Primary | ICD-10-CM

## 2023-09-11 DIAGNOSIS — J30.89 NON-SEASONAL ALLERGIC RHINITIS, UNSPECIFIED TRIGGER: ICD-10-CM

## 2023-09-11 PROCEDURE — 99214 OFFICE O/P EST MOD 30 MIN: CPT | Performed by: INTERNAL MEDICINE

## 2023-09-11 RX ORDER — ALBUTEROL SULFATE 90 UG/1
2 AEROSOL, METERED RESPIRATORY (INHALATION) EVERY 6 HOURS PRN
Qty: 18 G | Refills: 5 | Status: SHIPPED | OUTPATIENT
Start: 2023-09-11

## 2023-09-11 NOTE — PROGRESS NOTES
Office Progress Note - Pulmonary    Rita Cisneros 26 y.o. female MRN: [id number]    Encounter: [id number]      Assessment:  • Recent acute bronchitis with lingering cough. • Bronchial asthma. • Allergic rhinitis. • Obesity. Plan:   • Symbicort 160/4.5, 2 inhalations twice a day. • Albuterol rescue inhaler 2 inhalations 4 times a day as needed. • Fluticasone nasal spray 2 sprays to each nostril once a day. • Follow-up in 6 months. Discussion:   The patient's cough is due to her recent acute bronchitis. I have recommended against prednisone at this point in time. I told her to use her rescue inhaler as needed 4 times a day. She will continue with the Symbicort 160/4.5, 2 inhalations twice a day. I have renewed the prescription. If her symptoms of cough persist then she will call me in 2 weeks and we will prescribe prednisone. I will see her in 6 months. Subjective: The patient is here for a follow-up visit. Last month she developed acute bronchitis. She still has lingering cough. It started with productive cough while sputum. The cough now is more dry. No nocturnal cough. She is using the Symbicort 160/4.5, 2 inhalations twice a day. She is using the albuterol rescue inhaler on average twice a day. It helps to cough when she uses it. She was having issues with kidney stones and recurrent urinary tract infections. Review of systems:  A 12 point system review is done and aside from what is stated above the rest of the review of systems is negative. Family history and social history are reviewed. Medications list is reviewed. Vitals: Blood pressure 118/70, pulse 88, height 5' 10" (1.778 m), weight 107 kg (236 lb), last menstrual period 08/01/2023, SpO2 98 %. ,     Physical Exam  Gen: Awake, alert, oriented x 3, no acute distress  HEENT: Mucous membranes moist, no oral lesions, no thrush  NECK: No accessory muscle use, JVP not elevated  Cardiac: Regular, single S1, single S2, no murmurs, no rubs, no gallops  Lungs: Clear breath sounds. No wheezing or rhonchi. Abdomen: normoactive bowel sounds, soft nontender, nondistended, no rebound or rigidity, no guarding  Extremities: no cyanosis, no clubbing, no edema  Neuro:  Grossly nonfocal.  Skin:  No rash. Chest x-ray from September 4 is reviewed on PACS system. Showed no acute pulmonary findings.     Lab Results   Component Value Date    WBC 14.41 (H) 09/04/2023    HGB 13.3 09/04/2023    HCT 40.3 09/04/2023    MCV 87 09/04/2023     (H) 09/04/2023     Lab Results   Component Value Date    SODIUM 137 09/04/2023    K 3.7 09/04/2023     09/04/2023    CO2 26 09/04/2023    BUN 10 09/04/2023    CREATININE 0.60 09/04/2023    GLUC 83 09/04/2023    CALCIUM 9.3 09/04/2023

## 2023-09-26 ENCOUNTER — OFFICE VISIT (OUTPATIENT)
Dept: UROLOGY | Facility: CLINIC | Age: 23
End: 2023-09-26
Payer: COMMERCIAL

## 2023-09-26 VITALS
HEART RATE: 82 BPM | OXYGEN SATURATION: 97 % | HEIGHT: 70 IN | BODY MASS INDEX: 34.5 KG/M2 | WEIGHT: 241 LBS | SYSTOLIC BLOOD PRESSURE: 138 MMHG | DIASTOLIC BLOOD PRESSURE: 84 MMHG

## 2023-09-26 DIAGNOSIS — N30.00 ACUTE CYSTITIS WITHOUT HEMATURIA: ICD-10-CM

## 2023-09-26 DIAGNOSIS — N20.0 NEPHROLITHIASIS: Primary | ICD-10-CM

## 2023-09-26 PROCEDURE — 99214 OFFICE O/P EST MOD 30 MIN: CPT | Performed by: UROLOGY

## 2023-09-26 NOTE — PROGRESS NOTES
9/26/2023    Mario Alberto Drummond  2000  [id number]        Assessment  History of nephrolithiasis status post right ureteroscopy with Dr. Catalina Cleary in May 2023, concern for recurrent UTI      Discussion  First I provided the patient with reassurance that all recent culture showed no evidence of pathologic bacterial infection of the urine. I have ordered another urinalysis and urine culture if she believes she has another infection but I do not feel that antibiotics are indicated at this time. With regards to her CT scan results she has small bilateral nonobstructing stones. I reassured the patient that this is unlikely to be causing any type of flank pain as the stones are quite small and nonobstructing in nature. In addition, she appears to have some point tenderness over the flank on both sides which could be related to a musculoskeletal etiology for her discomfort. I stressed the importance of hydration along with nonsteroidals and Advil as needed. I also recommend that she continue to follow with nephrology and take potassium citrate as she is only 26years of age with recurrent stone disease. History of Present Illness  26 y.o. female with a history of right-sided nephrolithiasis. In May 2023 she underwent right ureteroscopy with Dr. Catalina Cleayr. She returns in follow-up today. She had a CT stone study performed on September 5, 2023 which shows small bilateral nonobstructing stones measuring up to 2 mm. There is no evidence of obstruction or hydronephrosis. AUA Symptom Score      Review of Systems  Review of Systems   Constitutional: Negative. HENT: Negative. Eyes: Negative. Respiratory: Negative. Cardiovascular: Negative. Gastrointestinal: Negative. Endocrine: Negative. Genitourinary:        Per HPI   Musculoskeletal: Negative. Skin: Negative. Allergic/Immunologic: Negative. Neurological: Negative. Hematological: Negative. Psychiatric/Behavioral: Negative. Past Medical History  Past Medical History:   Diagnosis Date   • Exercise-induced asthma    • Kidney stone    • Ovarian cyst        Past Social History  Past Surgical History:   Procedure Laterality Date   • APPENDECTOMY     • MO CYSTO/URETERO W/LITHOTRIPSY &INDWELL STENT INSRT Right 5/29/2023    Procedure: CYSTOSCOPY URETEROSCOPY WITH LITHOTRIPSY HOLMIUM LASER,  INSERTION STENT URETERAL;  Surgeon: Avinash Pate MD;  Location: [facility];  Service: Urology   • UPPER GASTROINTESTINAL ENDOSCOPY     • WISDOM TOOTH EXTRACTION     • WISDOM TOOTH EXTRACTION         Past Family History  Family History   Problem Relation Age of Onset   • No Known Problems Father    • No Known Problems Mother    • Heart disease Maternal Grandfather        Past Social history  Social History     Socioeconomic History   • Marital status: Single     Spouse name: Not on file   • Number of children: Not on file   • Years of education: Not on file   • Highest education level: Not on file   Occupational History   • Not on file   Tobacco Use   • Smoking status: Never   • Smokeless tobacco: Never   Vaping Use   • Vaping Use: Never used   Substance and Sexual Activity   • Alcohol use: Yes     Comment: social    • Drug use: Never   • Sexual activity: Not Currently     Partners: Male     Birth control/protection: Condom   Other Topics Concern   • Not on file   Social History Narrative    Drinks one to two cups of coffee a day      Social Determinants of Health     Financial Resource Strain: Not on file   Food Insecurity: Not on file   Transportation Needs: Not on file   Physical Activity: Not on file   Stress: Not on file   Social Connections: Not on file   Intimate Partner Violence: Not on file   Housing Stability: Not on file       Current Medications  Current Outpatient Medications   Medication Sig Dispense Refill   • albuterol (PROVENTIL HFA,VENTOLIN HFA) 90 mcg/act inhaler Inhale 2 puffs every 6 (six) hours as needed for wheezing 18 g 5   • ascorbic acid (VITAMIN C) 500 MG tablet Take 500 mg by mouth     • busPIRone (BUSPAR) 7.5 mg tablet Take 10 mg by mouth 2 (two) times a day     • fluticasone (FLONASE) 50 mcg/act nasal spray 1 spray into each nostril daily 15.8 mL 5   • Multiple Vitamin (Multi-Day) TABS Take 1 tablet by mouth     • omeprazole (PriLOSEC) 40 MG capsule Take 1 capsule (40 mg total) by mouth daily 30 capsule 5   • ondansetron (ZOFRAN) 4 mg tablet Take 1 tablet (4 mg total) by mouth every 6 (six) hours 20 tablet 0   • phenazopyridine (PYRIDIUM) 200 mg tablet Take 1 tablet (200 mg total) by mouth 3 (three) times a day as needed for bladder spasms 30 tablet 0   • potassium citrate (UROCIT-K) 10 mEq TAKE 1 TABLET BY MOUTH 3 TIMES A DAY WITH MEALS 90 tablet 5   • Probiotic Product (Misc Intestinal Vikki Regulat) CAPS Take by mouth     • promethazine (PHENERGAN) 12.5 MG tablet Take 1 tablet (12.5 mg total) by mouth every 6 (six) hours as needed for nausea or vomiting 30 tablet 0   • Symbicort 160-4.5 MCG/ACT inhaler Inhale 2 puffs 2 (two) times a day Rinse mouth after use.  30.6 g 5   • bisacodyl (DULCOLAX) 5 mg EC tablet Take 2 tablets (10 mg total) by mouth in the morning for 14 doses Colonoscopy prep (Patient not taking: Reported on 9/26/2023) 28 tablet 0   • ketorolac (TORADOL) 10 mg tablet Take 1 tablet (10 mg total) by mouth every 6 (six) hours as needed for moderate pain for up to 5 days (Patient not taking: Reported on 9/26/2023) 20 tablet 0   • ketorolac (TORADOL) 10 mg tablet Take 1 tablet (10 mg total) by mouth every 6 (six) hours as needed for moderate pain for up to 5 days (Patient not taking: Reported on 9/26/2023) 20 tablet 0   • methenamine hippurate (HIPREX) 1 g tablet Take 1 tablet (1 g total) by mouth 2 (two) times a day with meals (Patient not taking: Reported on 9/26/2023) 30 tablet 3   • naproxen (Naprosyn) 500 mg tablet Take 1 tablet (500 mg total) by mouth 2 (two) times a day with meals for 5 days (Patient not taking: Reported on 9/26/2023) 10 tablet 0   • norethindrone-ethinyl estradiol (Junel 1/20) 1-20 MG-MCG per tablet Take 1 tablet by mouth daily (Patient not taking: Reported on 6/9/2023) 84 tablet 1   • oxybutynin (DITROPAN) 5 mg tablet Take 1 tablet (5 mg total) by mouth 3 (three) times a day as needed (bladder spasm) (Patient not taking: Reported on 6/9/2023) 20 tablet 0   • oxybutynin (DITROPAN) 5 mg tablet Take 1 tablet (5 mg total) by mouth 3 (three) times a day as needed (bladder spasm) (Patient not taking: Reported on 6/9/2023) 20 tablet 0   • oxyCODONE (ROXICODONE) 5 immediate release tablet Take 1 tablet (5 mg total) by mouth every 6 (six) hours as needed for moderate pain for up to 10 doses Max Daily Amount: 20 mg (Patient not taking: Reported on 6/9/2023) 10 tablet 0   • oxyCODONE (ROXICODONE) 5 immediate release tablet Take 1 tablet (5 mg total) by mouth every 6 (six) hours as needed for moderate pain for up to 10 doses Max Daily Amount: 20 mg (Patient not taking: Reported on 6/9/2023) 10 tablet 0   • phenazopyridine (PYRIDIUM) 200 mg tablet Take 1 tablet (200 mg total) by mouth 3 (three) times a day as needed for bladder spasms (Patient not taking: Reported on 9/26/2023) 30 tablet 0   • sucralfate (CARAFATE) 1 g tablet Take 1 tablet (1 g total) by mouth 4 (four) times a day for 12 doses (Patient not taking: Reported on 9/26/2023) 12 tablet 0   • Zofran ODT 4 MG disintegrating tablet Take 1 tablet (4 mg total) by mouth every 6 (six) hours as needed for nausea or vomiting (Patient not taking: Reported on 9/26/2023) 20 tablet 0     No current facility-administered medications for this visit. Allergies  No Known Allergies    Past Medical History, Social History, Family History, medications and allergies were reviewed.     Vitals  Vitals:    09/26/23 1043   BP: 138/84   BP Location: Left arm   Patient Position: Sitting   Cuff Size: Adult   Pulse: 82   SpO2: 97%   Weight: 109 kg (241 lb)   Height: 5' 10" (1.778 m)       Physical Exam  Physical Exam  On examination she is in no acute distress. Abdomen soft nontender nondistended.  examination reveals no CVA tenderness. There is point tenderness to both paraspinous muscles bilaterally most consistent with musculoskeletal discomfort. Skin is warm. Extremities without edema.   Neurologic is grossly intact and nonfocal.  Gait normal.  Affect normal      Results  No results found for: "PSA"  Lab Results   Component Value Date    CALCIUM 9.3 09/04/2023    K 3.7 09/04/2023    CO2 26 09/04/2023     09/04/2023    BUN 10 09/04/2023    CREATININE 0.60 09/04/2023     Lab Results   Component Value Date    WBC 14.41 (H) 09/04/2023    HGB 13.3 09/04/2023    HCT 40.3 09/04/2023    MCV 87 09/04/2023     (H) 09/04/2023         Office Urine Dip  Recent Results (from the past 1 hour(s))   Urine culture    Collection Time: 10/30/23 12:00 AM    Specimen: Urine   Result Value Ref Range    Urine Culture <10,000 cfu/ml    ]

## 2023-09-26 NOTE — LETTER
September 26, 2023     [facility]  [address]  [address] [id number]    Patient: Damaris Khan   YOB: 2000   Date of Visit: 9/26/2023       Dear Dr. Amanda Maldonado: Thank you for referring Damaris Khan to me for evaluation. Below are my notes for this consultation. If you have questions, please do not hesitate to call me. I look forward to following your patient along with you. Sincerely,        Maurice Albarran MD        CC: MD Maurice Evangelista MD  9/26/2023 11:19 AM  Sign when Signing Visit  9/26/2023    Damaris Khan  2000  [id number]        Assessment  History of nephrolithiasis status post right ureteroscopy with Dr. Mauri Fisher in May 2023, concern for recurrent UTI      Discussion  First I provided the patient with reassurance that all recent culture showed no evidence of pathologic bacterial infection of the urine. I have ordered another urinalysis and urine culture if she believes she has another infection but I do not feel that antibiotics are indicated at this time. With regards to her CT scan results she has small bilateral nonobstructing stones. I reassured the patient that this is unlikely to be causing any type of flank pain as the stones are quite small and nonobstructing in nature. In addition, she appears to have some point tenderness over the flank on both sides which could be related to a musculoskeletal etiology for her discomfort. I stressed the importance of hydration along with nonsteroidals and Advil as needed. I also recommend that she continue to follow with nephrology and take potassium citrate as she is only 26years of age with recurrent stone disease. History of Present Illness  26 y.o. female with a history of right-sided nephrolithiasis. In May 2023 she underwent right ureteroscopy with Dr. Mauri Fisher. She returns in follow-up today.   She had a CT stone study performed on September 5, 2023 which shows small bilateral nonobstructing stones measuring up to 2 mm. There is no evidence of obstruction or hydronephrosis. AUA Symptom Score      Review of Systems  Review of Systems   Constitutional: Negative. HENT: Negative. Eyes: Negative. Respiratory: Negative. Cardiovascular: Negative. Gastrointestinal: Negative. Endocrine: Negative. Genitourinary:        Per HPI   Musculoskeletal: Negative. Skin: Negative. Allergic/Immunologic: Negative. Neurological: Negative. Hematological: Negative. Psychiatric/Behavioral: Negative.           Past Medical History  Past Medical History:   Diagnosis Date   • Exercise-induced asthma    • Kidney stone    • Ovarian cyst        Past Social History  Past Surgical History:   Procedure Laterality Date   • APPENDECTOMY     • OR CYSTO/URETERO W/LITHOTRIPSY &INDWELL STENT INSRT Right 5/29/2023    Procedure: CYSTOSCOPY URETEROSCOPY WITH LITHOTRIPSY HOLMIUM LASER,  INSERTION STENT URETERAL;  Surgeon: Jerome Patel MD;  Location: [facility];  Service: Urology   • UPPER GASTROINTESTINAL ENDOSCOPY     • WISDOM TOOTH EXTRACTION     • WISDOM TOOTH EXTRACTION         Past Family History  Family History   Problem Relation Age of Onset   • No Known Problems Father    • No Known Problems Mother    • Heart disease Maternal Grandfather        Past Social history  Social History     Socioeconomic History   • Marital status: Single     Spouse name: Not on file   • Number of children: Not on file   • Years of education: Not on file   • Highest education level: Not on file   Occupational History   • Not on file   Tobacco Use   • Smoking status: Never   • Smokeless tobacco: Never   Vaping Use   • Vaping Use: Never used   Substance and Sexual Activity   • Alcohol use: Yes     Comment: social    • Drug use: Never   • Sexual activity: Not Currently     Partners: Male     Birth control/protection: Condom   Other Topics Concern   • Not on file Social History Narrative    Drinks one to two cups of coffee a day      Social Determinants of Health     Financial Resource Strain: Not on file   Food Insecurity: Not on file   Transportation Needs: Not on file   Physical Activity: Not on file   Stress: Not on file   Social Connections: Not on file   Intimate Partner Violence: Not on file   Housing Stability: Not on file       Current Medications  Current Outpatient Medications   Medication Sig Dispense Refill   • albuterol (PROVENTIL HFA,VENTOLIN HFA) 90 mcg/act inhaler Inhale 2 puffs every 6 (six) hours as needed for wheezing 18 g 5   • ascorbic acid (VITAMIN C) 500 MG tablet Take 500 mg by mouth     • busPIRone (BUSPAR) 7.5 mg tablet Take 10 mg by mouth 2 (two) times a day     • fluticasone (FLONASE) 50 mcg/act nasal spray 1 spray into each nostril daily 15.8 mL 5   • Multiple Vitamin (Multi-Day) TABS Take 1 tablet by mouth     • omeprazole (PriLOSEC) 40 MG capsule Take 1 capsule (40 mg total) by mouth daily 30 capsule 5   • ondansetron (ZOFRAN) 4 mg tablet Take 1 tablet (4 mg total) by mouth every 6 (six) hours 20 tablet 0   • phenazopyridine (PYRIDIUM) 200 mg tablet Take 1 tablet (200 mg total) by mouth 3 (three) times a day as needed for bladder spasms 30 tablet 0   • potassium citrate (UROCIT-K) 10 mEq TAKE 1 TABLET BY MOUTH 3 TIMES A DAY WITH MEALS 90 tablet 5   • Probiotic Product (Misc Intestinal Vikki Regulat) CAPS Take by mouth     • promethazine (PHENERGAN) 12.5 MG tablet Take 1 tablet (12.5 mg total) by mouth every 6 (six) hours as needed for nausea or vomiting 30 tablet 0   • Symbicort 160-4.5 MCG/ACT inhaler Inhale 2 puffs 2 (two) times a day Rinse mouth after use.  30.6 g 5   • bisacodyl (DULCOLAX) 5 mg EC tablet Take 2 tablets (10 mg total) by mouth in the morning for 14 doses Colonoscopy prep (Patient not taking: Reported on 9/26/2023) 28 tablet 0   • ketorolac (TORADOL) 10 mg tablet Take 1 tablet (10 mg total) by mouth every 6 (six) hours as needed for moderate pain for up to 5 days (Patient not taking: Reported on 9/26/2023) 20 tablet 0   • ketorolac (TORADOL) 10 mg tablet Take 1 tablet (10 mg total) by mouth every 6 (six) hours as needed for moderate pain for up to 5 days (Patient not taking: Reported on 9/26/2023) 20 tablet 0   • methenamine hippurate (HIPREX) 1 g tablet Take 1 tablet (1 g total) by mouth 2 (two) times a day with meals (Patient not taking: Reported on 9/26/2023) 30 tablet 3   • naproxen (Naprosyn) 500 mg tablet Take 1 tablet (500 mg total) by mouth 2 (two) times a day with meals for 5 days (Patient not taking: Reported on 9/26/2023) 10 tablet 0   • norethindrone-ethinyl estradiol (Junel 1/20) 1-20 MG-MCG per tablet Take 1 tablet by mouth daily (Patient not taking: Reported on 6/9/2023) 84 tablet 1   • oxybutynin (DITROPAN) 5 mg tablet Take 1 tablet (5 mg total) by mouth 3 (three) times a day as needed (bladder spasm) (Patient not taking: Reported on 6/9/2023) 20 tablet 0   • oxybutynin (DITROPAN) 5 mg tablet Take 1 tablet (5 mg total) by mouth 3 (three) times a day as needed (bladder spasm) (Patient not taking: Reported on 6/9/2023) 20 tablet 0   • oxyCODONE (ROXICODONE) 5 immediate release tablet Take 1 tablet (5 mg total) by mouth every 6 (six) hours as needed for moderate pain for up to 10 doses Max Daily Amount: 20 mg (Patient not taking: Reported on 6/9/2023) 10 tablet 0   • oxyCODONE (ROXICODONE) 5 immediate release tablet Take 1 tablet (5 mg total) by mouth every 6 (six) hours as needed for moderate pain for up to 10 doses Max Daily Amount: 20 mg (Patient not taking: Reported on 6/9/2023) 10 tablet 0   • phenazopyridine (PYRIDIUM) 200 mg tablet Take 1 tablet (200 mg total) by mouth 3 (three) times a day as needed for bladder spasms (Patient not taking: Reported on 9/26/2023) 30 tablet 0   • sucralfate (CARAFATE) 1 g tablet Take 1 tablet (1 g total) by mouth 4 (four) times a day for 12 doses (Patient not taking: Reported on 9/26/2023) 12 tablet 0   • Zofran ODT 4 MG disintegrating tablet Take 1 tablet (4 mg total) by mouth every 6 (six) hours as needed for nausea or vomiting (Patient not taking: Reported on 9/26/2023) 20 tablet 0     No current facility-administered medications for this visit. Allergies  No Known Allergies    Past Medical History, Social History, Family History, medications and allergies were reviewed. Vitals  Vitals:    09/26/23 1043   BP: 138/84   BP Location: Left arm   Patient Position: Sitting   Cuff Size: Adult   Pulse: 82   SpO2: 97%   Weight: 109 kg (241 lb)   Height: 5' 10" (1.778 m)       Physical Exam  Physical Exam  On examination she is in no acute distress. Abdomen soft nontender nondistended.  examination reveals no CVA tenderness. There is point tenderness to both paraspinous muscles bilaterally most consistent with musculoskeletal discomfort. Skin is warm. Extremities without edema.   Neurologic is grossly intact and nonfocal.  Gait normal.  Affect normal      Results  No results found for: "PSA"  Lab Results   Component Value Date    CALCIUM 9.3 09/04/2023    K 3.7 09/04/2023    CO2 26 09/04/2023     09/04/2023    BUN 10 09/04/2023    CREATININE 0.60 09/04/2023     Lab Results   Component Value Date    WBC 14.41 (H) 09/04/2023    HGB 13.3 09/04/2023    HCT 40.3 09/04/2023    MCV 87 09/04/2023     (H) 09/04/2023         Office Urine Dip  Recent Results (from the past 1 hour(s))   Urine culture    Collection Time: 10/30/23 12:00 AM    Specimen: Urine   Result Value Ref Range    Urine Culture <10,000 cfu/ml    ]

## 2023-10-12 ENCOUNTER — HOSPITAL ENCOUNTER (EMERGENCY)
Facility: HOSPITAL | Age: 23
Discharge: HOME | End: 2023-10-12
Attending: EMERGENCY MEDICINE
Payer: COMMERCIAL

## 2023-10-12 VITALS
RESPIRATION RATE: 20 BRPM | OXYGEN SATURATION: 97 % | SYSTOLIC BLOOD PRESSURE: 146 MMHG | HEART RATE: 97 BPM | TEMPERATURE: 98.2 F | DIASTOLIC BLOOD PRESSURE: 70 MMHG

## 2023-10-12 DIAGNOSIS — N61.1 BREAST ABSCESS: Primary | ICD-10-CM

## 2023-10-12 PROCEDURE — 76882 US LMTD JT/FCL EVL NVASC XTR: CPT | Performed by: EMERGENCY MEDICINE

## 2023-10-12 PROCEDURE — 99284 EMERGENCY DEPT VISIT MOD MDM: CPT | Performed by: EMERGENCY MEDICINE

## 2023-10-12 PROCEDURE — 99282 EMERGENCY DEPT VISIT SF MDM: CPT

## 2023-10-12 RX ORDER — SULFAMETHOXAZOLE AND TRIMETHOPRIM 800; 160 MG/1; MG/1
1 TABLET ORAL 2 TIMES DAILY
Qty: 14 TABLET | Refills: 0 | Status: SHIPPED | OUTPATIENT
Start: 2023-10-12 | End: 2023-10-19

## 2023-10-12 RX ORDER — SULFAMETHOXAZOLE AND TRIMETHOPRIM 800; 160 MG/1; MG/1
1 TABLET ORAL ONCE
Status: COMPLETED | OUTPATIENT
Start: 2023-10-12 | End: 2023-10-12

## 2023-10-12 RX ADMIN — SULFAMETHOXAZOLE AND TRIMETHOPRIM 1 TABLET: 800; 160 TABLET ORAL at 20:59

## 2023-10-12 NOTE — ED ATTENDING ATTESTATION
10/12/2023  I, Slime Zamora MD, saw and evaluated the patient. I have discussed the patient with the resident/non-physician practitioner and agree with the resident's/non-physician practitioner's findings, Plan of Care, and MDM as documented in the resident's/non-physician practitioner's note, except where noted. All available labs and Radiology studies were reviewed. I was present for key portions of any procedure(s) performed by the resident/non-physician practitioner and I was immediately available to provide assistance. At this point I agree with the current assessment done in the Emergency Department. I have conducted an independent evaluation of this patient a history and physical is as follows:    Chief Complaint   Patient presents with    Breast Mass     Pt c/o of lump appearing on her R breast this morning. She states it is bleeding, itchy and burning.      26 y.o. female presenting with a red itchy painful area to right inferior breast that patient noticed in the shower this morning. The area started draining some fluid. No fevers or chills. No breast trauma. No nipple discharge. Patient usually wears comfortable sports bras. On exam, there is a superficial area of erythema without appreciable induration but with evidence of a small opening via which no fluid can be expressed at this time. There is a rim of erythema around this area measuring approximately 5 cm. Limited bedside ultrasound by resident physician under my supervision reveals no evidence of a drainable fluid collection. Suspect a friction-induced superficial soft tissue infection/drained cutaneous abscess. Ddx includes arthropod bite. Nothing to drain at present. Nothing to suggest underlying mass. Will treat with a course of Bactrim along with warm compresses. Recommend follow up with PCP for reassessment. If issue continues to recur, would benefit from follow up with breast surgeon.

## 2023-10-13 NOTE — DISCHARGE INSTRUCTIONS
Follow-up with your primary care doctor within 1 week. A prescription was sent to the pharmacy for antibiotics. Return to the emergency room if symptoms worsen, fevers, vomiting, abdominal pain, increased redness, or any other symptoms that concern you.

## 2023-10-17 ENCOUNTER — APPOINTMENT (OUTPATIENT)
Dept: RADIOLOGY | Age: 23
End: 2023-10-17
Payer: COMMERCIAL

## 2023-10-17 ENCOUNTER — OFFICE VISIT (OUTPATIENT)
Dept: URGENT CARE | Age: 23
End: 2023-10-17
Payer: COMMERCIAL

## 2023-10-17 VITALS
BODY MASS INDEX: 34.5 KG/M2 | HEIGHT: 70 IN | OXYGEN SATURATION: 99 % | WEIGHT: 241 LBS | RESPIRATION RATE: 18 BRPM | HEART RATE: 66 BPM | TEMPERATURE: 98 F

## 2023-10-17 DIAGNOSIS — S69.91XA INJURY OF FINGER OF RIGHT HAND, INITIAL ENCOUNTER: ICD-10-CM

## 2023-10-17 DIAGNOSIS — S69.91XA INJURY OF FINGER OF RIGHT HAND, INITIAL ENCOUNTER: Primary | ICD-10-CM

## 2023-10-17 PROCEDURE — 29130 APPL FINGER SPLINT STATIC: CPT

## 2023-10-17 PROCEDURE — G0382 LEV 3 HOSP TYPE B ED VISIT: HCPCS

## 2023-10-17 PROCEDURE — 73140 X-RAY EXAM OF FINGER(S): CPT

## 2023-10-17 NOTE — PROGRESS NOTES
[facility]        NAME: Adina Patiño is a 26 y.o. female  : 2000    MRN: [id number]  DATE: 2023  TIME: 7:37 PM      Assessment and Plan     Injury of finger of right hand, initial encounter Mao Santos  1. Injury of finger of right hand, initial encounter  XR finger right third digit-middle    Ambulatory Referral to Hand Surgery        Wet read right hand xray shows no acute bony abnormalities. Patient tender to right middle finger; aluminum finger splint applied. Will monitor for final read     Patient Instructions   The final xray result will appear in your mychart; use the splint until you get the xray result, if the final xray shows a fracture, keep the splint on until you follow-up with orthopedics, if the xray shows no fracture, you can use the splint  as needed; take off every 1-2 hours and can take it off to sleep and shower if there is no fracture. The final result of the xray will appear in your my chart. Ice as needed. Rest.  Elevate. Acetaminophen or ibuprofen for pain. PCP follow-up in 3-5 days  Proceed to the ER if symptoms worsen. Chief Complaint     Chief Complaint   Patient presents with    Hand Pain     Got right middle finger caught in trap door today. Has brusing and pain. History of Present Illness     Patient is 26-year-old female who presents with right middle finger injury. States it occurred today. Reports pain and bruising to the area. Denies previous injury/fracture. Review of Systems     Review of Systems   Musculoskeletal:  Positive for arthralgias and joint swelling. Skin:  Negative for wound. Neurological:  Negative for numbness. Hematological:  Does not bruise/bleed easily. All other systems reviewed and are negative.         Current Medications       Current Outpatient Medications:     albuterol (PROVENTIL HFA,VENTOLIN HFA) 90 mcg/act inhaler, Inhale 2 puffs every 6 (six) hours as needed for wheezing, Disp: 18 g, Rfl: 5    ascorbic acid (VITAMIN C) 500 MG tablet, Take 500 mg by mouth, Disp: , Rfl:     busPIRone (BUSPAR) 7.5 mg tablet, Take 10 mg by mouth 2 (two) times a day, Disp: , Rfl:     fluticasone (FLONASE) 50 mcg/act nasal spray, 1 spray into each nostril daily, Disp: 15.8 mL, Rfl: 5    Multiple Vitamin (Multi-Day) TABS, Take 1 tablet by mouth, Disp: , Rfl:     ondansetron (ZOFRAN) 4 mg tablet, Take 1 tablet (4 mg total) by mouth every 6 (six) hours, Disp: 20 tablet, Rfl: 0    phenazopyridine (PYRIDIUM) 200 mg tablet, Take 1 tablet (200 mg total) by mouth 3 (three) times a day as needed for bladder spasms, Disp: 30 tablet, Rfl: 0    potassium citrate (UROCIT-K) 10 mEq, TAKE 1 TABLET BY MOUTH 3 TIMES A DAY WITH MEALS, Disp: 90 tablet, Rfl: 5    Probiotic Product (Misc Intestinal Vikki Regulat) CAPS, Take by mouth, Disp: , Rfl:     promethazine (PHENERGAN) 12.5 MG tablet, Take 1 tablet (12.5 mg total) by mouth every 6 (six) hours as needed for nausea or vomiting, Disp: 30 tablet, Rfl: 0    sulfamethoxazole-trimethoprim (BACTRIM DS) 800-160 mg per tablet, Take 1 tablet by mouth 2 (two) times a day for 7 days smx-tmp DS (BACTRIM) 800-160 mg tabs (1tab q12 D10), Disp: 14 tablet, Rfl: 0    Symbicort 160-4.5 MCG/ACT inhaler, Inhale 2 puffs 2 (two) times a day Rinse mouth after use., Disp: 30.6 g, Rfl: 5    bisacodyl (DULCOLAX) 5 mg EC tablet, Take 2 tablets (10 mg total) by mouth in the morning for 14 doses Colonoscopy prep (Patient not taking: Reported on 9/26/2023), Disp: 28 tablet, Rfl: 0    ketorolac (TORADOL) 10 mg tablet, Take 1 tablet (10 mg total) by mouth every 6 (six) hours as needed for moderate pain for up to 5 days (Patient not taking: Reported on 9/26/2023), Disp: 20 tablet, Rfl: 0    ketorolac (TORADOL) 10 mg tablet, Take 1 tablet (10 mg total) by mouth every 6 (six) hours as needed for moderate pain for up to 5 days (Patient not taking: Reported on 9/26/2023), Disp: 20 tablet, Rfl: 0    methenamine hippurate (HIPREX) 1 g tablet, Take 1 tablet (1 g total) by mouth 2 (two) times a day with meals (Patient not taking: Reported on 9/26/2023), Disp: 30 tablet, Rfl: 3    naproxen (Naprosyn) 500 mg tablet, Take 1 tablet (500 mg total) by mouth 2 (two) times a day with meals for 5 days (Patient not taking: Reported on 9/26/2023), Disp: 10 tablet, Rfl: 0    norethindrone-ethinyl estradiol (Junel 1/20) 1-20 MG-MCG per tablet, Take 1 tablet by mouth daily (Patient not taking: Reported on 6/9/2023), Disp: 84 tablet, Rfl: 1    omeprazole (PriLOSEC) 40 MG capsule, Take 1 capsule (40 mg total) by mouth daily, Disp: 30 capsule, Rfl: 5    oxybutynin (DITROPAN) 5 mg tablet, Take 1 tablet (5 mg total) by mouth 3 (three) times a day as needed (bladder spasm) (Patient not taking: Reported on 6/9/2023), Disp: 20 tablet, Rfl: 0    oxybutynin (DITROPAN) 5 mg tablet, Take 1 tablet (5 mg total) by mouth 3 (three) times a day as needed (bladder spasm) (Patient not taking: Reported on 6/9/2023), Disp: 20 tablet, Rfl: 0    oxyCODONE (ROXICODONE) 5 immediate release tablet, Take 1 tablet (5 mg total) by mouth every 6 (six) hours as needed for moderate pain for up to 10 doses Max Daily Amount: 20 mg (Patient not taking: Reported on 6/9/2023), Disp: 10 tablet, Rfl: 0    oxyCODONE (ROXICODONE) 5 immediate release tablet, Take 1 tablet (5 mg total) by mouth every 6 (six) hours as needed for moderate pain for up to 10 doses Max Daily Amount: 20 mg (Patient not taking: Reported on 6/9/2023), Disp: 10 tablet, Rfl: 0    phenazopyridine (PYRIDIUM) 200 mg tablet, Take 1 tablet (200 mg total) by mouth 3 (three) times a day as needed for bladder spasms (Patient not taking: Reported on 9/26/2023), Disp: 30 tablet, Rfl: 0    sucralfate (CARAFATE) 1 g tablet, Take 1 tablet (1 g total) by mouth 4 (four) times a day for 12 doses (Patient not taking: Reported on 9/26/2023), Disp: 12 tablet, Rfl: 0    Zofran ODT 4 MG disintegrating tablet, Take 1 tablet (4 mg total) by mouth every 6 (six) hours as needed for nausea or vomiting (Patient not taking: Reported on 9/26/2023), Disp: 20 tablet, Rfl: 0    Current Allergies     Allergies as of 10/17/2023    (No Known Allergies)              The following portions of the patient's history were reviewed and updated as appropriate: allergies, current medications, past family history, past medical history, past social history, past surgical history and problem list.     Past Medical History:   Diagnosis Date    Exercise-induced asthma     Kidney stone     Ovarian cyst        Past Surgical History:   Procedure Laterality Date    APPENDECTOMY      WI CYSTO/URETERO W/LITHOTRIPSY &INDWELL STENT INSRT Right 5/29/2023    Procedure: CYSTOSCOPY URETEROSCOPY WITH LITHOTRIPSY HOLMIUM LASER,  INSERTION STENT URETERAL;  Surgeon: Bjorn Koneig MD;  Location: [facility];  Service: Urology    UPPER GASTROINTESTINAL ENDOSCOPY      WISDOM TOOTH EXTRACTION      WISDOM TOOTH EXTRACTION         Family History   Problem Relation Age of Onset    No Known Problems Father     No Known Problems Mother     Heart disease Maternal Grandfather          Medications have been verified. Objective     Pulse 66   Temp 98 °F (36.7 °C) (Tympanic)   Resp 18   Ht 5' 10" (1.778 m)   Wt 109 kg (241 lb)   SpO2 99%   BMI 34.58 kg/m²   No LMP recorded. Physical Exam     Physical Exam  Vitals and nursing note reviewed. Constitutional:       General: She is awake. She is not in acute distress. Appearance: Normal appearance. She is not ill-appearing, toxic-appearing or diaphoretic. Musculoskeletal:      Right hand: Swelling, tenderness and bony tenderness present. Normal range of motion. Normal strength. Normal capillary refill. Normal pulse. Hands:    Skin:     General: Skin is warm. Capillary Refill: Capillary refill takes less than 2 seconds. Neurological:      Mental Status: She is alert.    Psychiatric:         Mood and Affect: Mood normal. Behavior: Behavior normal.         Thought Content: Thought content normal.         Judgment: Judgment normal.     Orthopedic injury treatment    Date/Time: 10/17/2023 7:00 PM    Performed by: YESSENIA Sun  Authorized by: YESSENIA Sun    Patient Location:  [facility] Protocol:  Consent: Verbal consent obtained.   Risks and benefits: risks, benefits and alternatives were discussed  Consent given by: patient  Patient understanding: patient states understanding of the procedure being performed    Injury location:  Finger  Location details:  Right long finger  Neurovascular status: Neurovascularly intact    Distal perfusion: normal    Neurological function: normal    Range of motion: normal    Immobilization:  Splint  Supplies used:  Aluminum splint  Neurovascular status: Neurovascularly intact    Distal perfusion: normal    Neurological function: normal    Range of motion: normal    Patient tolerance:  Patient tolerated the procedure well with no immediate complications

## 2023-10-17 NOTE — PATIENT INSTRUCTIONS
The final xray result will appear in your mychart; use the splint until you get the xray result, if the final xray shows a fracture, keep the splint on until you follow-up with orthopedics, if the xray shows no fracture, you can use the splint  as needed; take off every 1-2 hours and can take it off to sleep and shower if there is no fracture. The final result of the xray will appear in your my chart. Ice as needed. Rest.  Elevate. Acetaminophen or ibuprofen for pain. PCP follow-up in 3-5 days  Proceed to the ER if symptoms worsen.

## 2023-10-20 NOTE — ED PROVIDER NOTES
History  Chief Complaint   Patient presents with    Breast Mass     Pt c/o of lump appearing on her R breast this morning. She states it is bleeding, itchy and burning. MODESTA Moncada is a 26 y.o. female who presents to the emergency department with a red swollen lump on the underside of her right breast.  She first noticed this in the shower this morning when she noticed a tender red bump that was spontaneously draining some blood. She is unsure if there was any thick discharge due to the water in the shower rinsing it away quickly. She denies any prior history of similar bumps and denies finding any other ones today. She denies fevers, abdominal pain, nausea, or vomiting. She denies recent antibiotic use. Prior to Admission Medications   Prescriptions Last Dose Informant Patient Reported? Taking? Multiple Vitamin (Multi-Day) TABS  Self Yes No   Sig: Take 1 tablet by mouth   Probiotic Product (Misc Intestinal Vikki Regulat) CAPS  Self Yes No   Sig: Take by mouth   Symbicort 160-4.5 MCG/ACT inhaler  Self No No   Sig: Inhale 2 puffs 2 (two) times a day Rinse mouth after use.    Zofran ODT 4 MG disintegrating tablet  Self No No   Sig: Take 1 tablet (4 mg total) by mouth every 6 (six) hours as needed for nausea or vomiting   Patient not taking: Reported on 2023   albuterol (PROVENTIL HFA,VENTOLIN HFA) 90 mcg/act inhaler  Self No No   Sig: Inhale 2 puffs every 6 (six) hours as needed for wheezing   ascorbic acid (VITAMIN C) 500 MG tablet  Self Yes No   Sig: Take 500 mg by mouth   bisacodyl (DULCOLAX) 5 mg EC tablet   No No   Sig: Take 2 tablets (10 mg total) by mouth in the morning for 14 doses Colonoscopy prep   Patient not taking: Reported on 2023   busPIRone (BUSPAR) 7.5 mg tablet  Self Yes No   Sig: Take 10 mg by mouth 2 (two) times a day   fluticasone (FLONASE) 50 mcg/act nasal spray  Self No No   Si spray into each nostril daily   ketorolac (TORADOL) 10 mg tablet   No No   Sig: Take 1 tablet (10 mg total) by mouth every 6 (six) hours as needed for moderate pain for up to 5 days   Patient not taking: Reported on 9/26/2023   ketorolac (TORADOL) 10 mg tablet   No No   Sig: Take 1 tablet (10 mg total) by mouth every 6 (six) hours as needed for moderate pain for up to 5 days   Patient not taking: Reported on 9/26/2023   methenamine hippurate (HIPREX) 1 g tablet  Self No No   Sig: Take 1 tablet (1 g total) by mouth 2 (two) times a day with meals   Patient not taking: Reported on 9/26/2023   naproxen (Naprosyn) 500 mg tablet   No No   Sig: Take 1 tablet (500 mg total) by mouth 2 (two) times a day with meals for 5 days   Patient not taking: Reported on 9/26/2023   norethindrone-ethinyl estradiol (Junel 1/20) 1-20 MG-MCG per tablet  Self No No   Sig: Take 1 tablet by mouth daily   Patient not taking: Reported on 6/9/2023   omeprazole (PriLOSEC) 40 MG capsule  Self No No   Sig: Take 1 capsule (40 mg total) by mouth daily   ondansetron (ZOFRAN) 4 mg tablet  Self No No   Sig: Take 1 tablet (4 mg total) by mouth every 6 (six) hours   oxyCODONE (ROXICODONE) 5 immediate release tablet  Self No No   Sig: Take 1 tablet (5 mg total) by mouth every 6 (six) hours as needed for moderate pain for up to 10 doses Max Daily Amount: 20 mg   Patient not taking: Reported on 6/9/2023   oxyCODONE (ROXICODONE) 5 immediate release tablet  Self No No   Sig: Take 1 tablet (5 mg total) by mouth every 6 (six) hours as needed for moderate pain for up to 10 doses Max Daily Amount: 20 mg   Patient not taking: Reported on 6/9/2023   oxybutynin (DITROPAN) 5 mg tablet  Self No No   Sig: Take 1 tablet (5 mg total) by mouth 3 (three) times a day as needed (bladder spasm)   Patient not taking: Reported on 6/9/2023   oxybutynin (DITROPAN) 5 mg tablet  Self No No   Sig: Take 1 tablet (5 mg total) by mouth 3 (three) times a day as needed (bladder spasm)   Patient not taking: Reported on 6/9/2023   phenazopyridine (PYRIDIUM) 200 mg tablet  Self No No   Sig: Take 1 tablet (200 mg total) by mouth 3 (three) times a day as needed for bladder spasms   phenazopyridine (PYRIDIUM) 200 mg tablet  Self No No   Sig: Take 1 tablet (200 mg total) by mouth 3 (three) times a day as needed for bladder spasms   Patient not taking: Reported on 9/26/2023   potassium citrate (UROCIT-K) 10 mEq  Self No No   Sig: TAKE 1 TABLET BY MOUTH 3 TIMES A DAY WITH MEALS   promethazine (PHENERGAN) 12.5 MG tablet  Self No No   Sig: Take 1 tablet (12.5 mg total) by mouth every 6 (six) hours as needed for nausea or vomiting   sucralfate (CARAFATE) 1 g tablet  Self No No   Sig: Take 1 tablet (1 g total) by mouth 4 (four) times a day for 12 doses   Patient not taking: Reported on 9/26/2023      Facility-Administered Medications: None       Past Medical History:   Diagnosis Date    Exercise-induced asthma     Kidney stone     Ovarian cyst        Past Surgical History:   Procedure Laterality Date    APPENDECTOMY      [place] CYSTO/URETERO W/LITHOTRIPSY &INDWELL STENT INSRT Right 5/29/2023    Procedure: CYSTOSCOPY URETEROSCOPY WITH LITHOTRIPSY HOLMIUM LASER,  INSERTION STENT URETERAL;  Surgeon: Bob Ballesteros MD;  Location: [facility];  Service: Urology    UPPER GASTROINTESTINAL ENDOSCOPY      WISDOM TOOTH EXTRACTION      WISDOM TOOTH EXTRACTION         Family History   Problem Relation Age of Onset    No Known Problems Father     No Known Problems Mother     Heart disease Maternal Grandfather      I have reviewed and agree with the history as documented.     E-Cigarette/Vaping    E-Cigarette Use Never User      E-Cigarette/Vaping Substances    Nicotine No     THC No     CBD No     Flavoring No     Other No     Unknown No      Social History     Tobacco Use    Smoking status: Never    Smokeless tobacco: Never   Vaping Use    Vaping Use: Never used   Substance Use Topics    Alcohol use: Yes     Comment: social     Drug use: Never       Home medications:  Prior to Admission Medications Prescriptions Last Dose Informant Patient Reported? Taking? Multiple Vitamin (Multi-Day) TABS  Self Yes No   Sig: Take 1 tablet by mouth   Probiotic Product (Misc Intestinal Vikki Regulat) CAPS  Self Yes No   Sig: Take by mouth   Symbicort 160-4.5 MCG/ACT inhaler  Self No No   Sig: Inhale 2 puffs 2 (two) times a day Rinse mouth after use.    Zofran ODT 4 MG disintegrating tablet  Self No No   Sig: Take 1 tablet (4 mg total) by mouth every 6 (six) hours as needed for nausea or vomiting   Patient not taking: Reported on 2023   albuterol (PROVENTIL HFA,VENTOLIN HFA) 90 mcg/act inhaler  Self No No   Sig: Inhale 2 puffs every 6 (six) hours as needed for wheezing   ascorbic acid (VITAMIN C) 500 MG tablet  Self Yes No   Sig: Take 500 mg by mouth   bisacodyl (DULCOLAX) 5 mg EC tablet   No No   Sig: Take 2 tablets (10 mg total) by mouth in the morning for 14 doses Colonoscopy prep   Patient not taking: Reported on 2023   busPIRone (BUSPAR) 7.5 mg tablet  Self Yes No   Sig: Take 10 mg by mouth 2 (two) times a day   fluticasone (FLONASE) 50 mcg/act nasal spray  Self No No   Si spray into each nostril daily   ketorolac (TORADOL) 10 mg tablet   No No   Sig: Take 1 tablet (10 mg total) by mouth every 6 (six) hours as needed for moderate pain for up to 5 days   Patient not taking: Reported on 2023   ketorolac (TORADOL) 10 mg tablet   No No   Sig: Take 1 tablet (10 mg total) by mouth every 6 (six) hours as needed for moderate pain for up to 5 days   Patient not taking: Reported on 2023   methenamine hippurate (HIPREX) 1 g tablet  Self No No   Sig: Take 1 tablet (1 g total) by mouth 2 (two) times a day with meals   Patient not taking: Reported on 2023   naproxen (Naprosyn) 500 mg tablet   No No   Sig: Take 1 tablet (500 mg total) by mouth 2 (two) times a day with meals for 5 days   Patient not taking: Reported on 2023   norethindrone-ethinyl estradiol (Junel 1/20) 1-20 MG-MCG per tablet  Self No No   Sig: Take 1 tablet by mouth daily   Patient not taking: Reported on 6/9/2023   omeprazole (PriLOSEC) 40 MG capsule  Self No No   Sig: Take 1 capsule (40 mg total) by mouth daily   ondansetron (ZOFRAN) 4 mg tablet  Self No No   Sig: Take 1 tablet (4 mg total) by mouth every 6 (six) hours   oxyCODONE (ROXICODONE) 5 immediate release tablet  Self No No   Sig: Take 1 tablet (5 mg total) by mouth every 6 (six) hours as needed for moderate pain for up to 10 doses Max Daily Amount: 20 mg   Patient not taking: Reported on 6/9/2023   oxyCODONE (ROXICODONE) 5 immediate release tablet  Self No No   Sig: Take 1 tablet (5 mg total) by mouth every 6 (six) hours as needed for moderate pain for up to 10 doses Max Daily Amount: 20 mg   Patient not taking: Reported on 6/9/2023   oxybutynin (DITROPAN) 5 mg tablet  Self No No   Sig: Take 1 tablet (5 mg total) by mouth 3 (three) times a day as needed (bladder spasm)   Patient not taking: Reported on 6/9/2023   oxybutynin (DITROPAN) 5 mg tablet  Self No No   Sig: Take 1 tablet (5 mg total) by mouth 3 (three) times a day as needed (bladder spasm)   Patient not taking: Reported on 6/9/2023   phenazopyridine (PYRIDIUM) 200 mg tablet  Self No No   Sig: Take 1 tablet (200 mg total) by mouth 3 (three) times a day as needed for bladder spasms   phenazopyridine (PYRIDIUM) 200 mg tablet  Self No No   Sig: Take 1 tablet (200 mg total) by mouth 3 (three) times a day as needed for bladder spasms   Patient not taking: Reported on 9/26/2023   potassium citrate (UROCIT-K) 10 mEq  Self No No   Sig: TAKE 1 TABLET BY MOUTH 3 TIMES A DAY WITH MEALS   promethazine (PHENERGAN) 12.5 MG tablet  Self No No   Sig: Take 1 tablet (12.5 mg total) by mouth every 6 (six) hours as needed for nausea or vomiting   sucralfate (CARAFATE) 1 g tablet  Self No No   Sig: Take 1 tablet (1 g total) by mouth 4 (four) times a day for 12 doses   Patient not taking: Reported on 9/26/2023      Facility-Administered Medications: None     Allergies:  No Known Allergies     Review of Systems   Constitutional:  Negative for fever. Respiratory:  Negative for shortness of breath. Cardiovascular:  Negative for chest pain. Gastrointestinal:  Negative for abdominal pain, nausea and vomiting. All other systems reviewed and are negative. Physical Exam  ED Triage Vitals [10/12/23 1942]   Temperature Pulse Respirations Blood Pressure SpO2   98.2 °F (36.8 °C) 97 20 146/70 97 %      Temp Source Heart Rate Source Patient Position - Orthostatic VS BP Location FiO2 (%)   Oral -- Sitting Right arm --      Pain Score       5             Orthostatic Vital Signs  Vitals:    10/12/23 1942   BP: 146/70   Pulse: 97   Patient Position - Orthostatic VS: Sitting       Physical Exam  Vitals and nursing note reviewed. Exam conducted with a chaperone present. Constitutional:       General: She is not in acute distress. Appearance: She is not ill-appearing. HENT:      Head: Normocephalic. Mouth/Throat:      Mouth: Mucous membranes are moist.   Eyes:      Pupils: Pupils are equal, round, and reactive to light. Cardiovascular:      Rate and Rhythm: Normal rate and regular rhythm. Heart sounds: No murmur heard. Pulmonary:      Effort: Pulmonary effort is normal. No respiratory distress. Breath sounds: Normal breath sounds. No wheezing, rhonchi or rales. Chest:       Abdominal:      General: Abdomen is flat. There is no distension. Palpations: Abdomen is soft. Tenderness: There is no abdominal tenderness. There is no guarding or rebound. Skin:     General: Skin is warm and dry. Neurological:      Mental Status: She is alert.          ED Medications  Medications   sulfamethoxazole-trimethoprim (BACTRIM DS) 800-160 mg per tablet 1 tablet (1 tablet Oral Given 10/12/23 2059)       Diagnostic Studies  Results Reviewed       None                   No orders to display         Procedures  POC MSK/Soft Tissue US    Date/Time: 10/12/2023 8:30 PM    Performed by: Bill Begum MD  Authorized by: Bill Begum MD    Patient location:  ED  Performed by:  Resident  Other Assisting Provider: Yes (comment) (Dr. Navid Mcknight)    Procedure:     Performed: soft tissue ultrasound    Procedure details:     Exam Type:  Diagnostic    Longitudinal view:  Obtained    Transverse view:  Obtained    Image quality: diagnostic      Image availability:  Images available in PACS  Soft tissue ultrasound:     Soft tissue indications: suspected abscess      Anatomic location:  Breast    Soft tissue findings: cobblestoning    Interpretation:     Soft tissue impressions comment:  Abscess that spontaneously drained, minimal pocket of fluid remaining        ED Course                             SBIRT 20yo+      Flowsheet Row Most Recent Value   Initial Alcohol Screen: US AUDIT-C     1. How often do you have a drink containing alcohol? 0 Filed at: 10/12/2023 2014   2. How many drinks containing alcohol do you have on a typical day you are drinking? 0 Filed at: 10/12/2023 2014   3b. FEMALE Any Age, or MALE 65+: How often do you have 4 or more drinks on one occassion? 0 Filed at: 10/12/2023 2014   Audit-C Score 0 Filed at: 10/12/2023 2014   MILADY: How many times in the past year have you. .. Used an illegal drug or used a prescription medication for non-medical reasons? Never Filed at: 10/12/2023 2014                  [facility]  Medical Decision Making  Risk  Prescription drug management. Tati Anderson is a 26 y.o. female who presents to the emergency department with right breast abscess that spontaneously drained. Workup including vital signs, physical exam, US. Bedside ultrasound with minimal amount of remaining fluid, plan to leave open without further drainage.   Plan for course of Bactrim, overlying mild erythema border marked with skin marker, instructed patient to have follow-up visit with primary care doctor within 1 week to assess for improvement. Instructed patient to return to ER for reevaluation if swelling increases or erythema spreads past marked borders, or fevers, abdominal pain, nausea, or vomiting. Stable for discharge home with primary care follow up, discharge instructions and return precautions given. Disposition  Final diagnoses:   Breast abscess     Time reflects when diagnosis was documented in both MDM as applicable and the Disposition within this note       Time User Action Codes Description Comment    10/12/2023  8:54 PM Asha Bautista Add [N61.1] Breast abscess           ED Disposition       ED Disposition   Discharge    Condition   Stable    Date/Time   Thu Oct 12, 2023 2055    [facility] discharge to home/self care.                    Follow-up Information       Follow up With Specialties Details Why [address], [address] Surgical, Nurse Practitioner In 1 week  1 [facility]  [address] [place] [address] [id number]              Discharge Medication List as of 10/12/2023  8:55 PM        START taking these medications    Details   sulfamethoxazole-trimethoprim (BACTRIM DS) 800-160 mg per tablet Take 1 tablet by mouth 2 (two) times a day for 7 days smx-tmp DS (BACTRIM) 800-160 mg tabs (1tab q12 D10), Starting Thu 10/12/2023, Until Thu 10/19/2023, Normal           CONTINUE these medications which have NOT CHANGED    Details   albuterol (PROVENTIL HFA,VENTOLIN HFA) 90 mcg/act inhaler Inhale 2 puffs every 6 (six) hours as needed for wheezing, Starting Mon 9/11/2023, Normal      ascorbic acid (VITAMIN C) 500 MG tablet Take 500 mg by mouth, Historical Med      bisacodyl (DULCOLAX) 5 mg EC tablet Take 2 tablets (10 mg total) by mouth in the morning for 14 doses Colonoscopy prep, Starting Thu 11/17/2022, Until Thu 12/1/2022, Normal      busPIRone (BUSPAR) 7.5 mg tablet Take 10 mg by mouth 2 (two) times a day, Starting Mon 3/20/2023, Historical Med      fluticasone (FLONASE) 50 mcg/act nasal spray 1 spray into each nostril daily, Starting Thu 9/1/2022, Normal      ketorolac (TORADOL) 10 mg tablet Take 1 tablet (10 mg total) by mouth every 6 (six) hours as needed for moderate pain for up to 5 days, Starting Mon 5/29/2023, Until Sat 6/3/2023 at 2359, Normal      ketorolac (TORADOL) 10 mg tablet Take 1 tablet (10 mg total) by mouth every 6 (six) hours as needed for moderate pain for up to 5 days, Starting Mon 5/29/2023, Until Sat 6/3/2023 at 2359, Normal      methenamine hippurate (HIPREX) 1 g tablet Take 1 tablet (1 g total) by mouth 2 (two) times a day with meals, Starting Fri 6/9/2023, Print      Multiple Vitamin (Multi-Day) TABS Take 1 tablet by mouth, Historical Med      naproxen (Naprosyn) 500 mg tablet Take 1 tablet (500 mg total) by mouth 2 (two) times a day with meals for 5 days, Starting Tue 5/23/2023, Until Sun 5/28/2023, Normal      norethindrone-ethinyl estradiol (Junel 1/20) 1-20 MG-MCG per tablet Take 1 tablet by mouth daily, Starting Wed 11/2/2022, Normal      omeprazole (PriLOSEC) 40 MG capsule Take 1 capsule (40 mg total) by mouth daily, Starting Wed 3/29/2023, Until Tue 9/26/2023, Normal      ondansetron (ZOFRAN) 4 mg tablet Take 1 tablet (4 mg total) by mouth every 6 (six) hours, Starting Tue 9/5/2023, Normal      !! oxybutynin (DITROPAN) 5 mg tablet Take 1 tablet (5 mg total) by mouth 3 (three) times a day as needed (bladder spasm), Starting Mon 5/29/2023, Normal      !! oxybutynin (DITROPAN) 5 mg tablet Take 1 tablet (5 mg total) by mouth 3 (three) times a day as needed (bladder spasm), Starting Mon 5/29/2023, Normal      !! oxyCODONE (ROXICODONE) 5 immediate release tablet Take 1 tablet (5 mg total) by mouth every 6 (six) hours as needed for moderate pain for up to 10 doses Max Daily Amount: 20 mg, Starting Mon 5/29/2023, Normal      !! oxyCODONE (ROXICODONE) 5 immediate release tablet Take 1 tablet (5 mg total) by mouth every 6 (six) hours as needed for moderate pain for up to 10 doses Max Daily Amount: 20 mg, Starting Mon 5/29/2023, Normal      !! phenazopyridine (PYRIDIUM) 200 mg tablet Take 1 tablet (200 mg total) by mouth 3 (three) times a day as needed for bladder spasms, Starting Mon 5/29/2023, Normal      !! phenazopyridine (PYRIDIUM) 200 mg tablet Take 1 tablet (200 mg total) by mouth 3 (three) times a day as needed for bladder spasms, Starting Mon 5/29/2023, Normal      potassium citrate (UROCIT-K) 10 mEq TAKE 1 TABLET BY MOUTH 3 TIMES A DAY WITH MEALS, Starting Tue 9/26/2023, Normal      Probiotic Product (Misc Intestinal Vikki Regulat) CAPS Take by mouth, Historical Med      promethazine (PHENERGAN) 12.5 MG tablet Take 1 tablet (12.5 mg total) by mouth every 6 (six) hours as needed for nausea or vomiting, Starting Tue 10/25/2022, Normal      sucralfate (CARAFATE) 1 g tablet Take 1 tablet (1 g total) by mouth 4 (four) times a day for 12 doses, Starting Sun 10/23/2022, Until Wed 10/26/2022, Normal      Symbicort 160-4.5 MCG/ACT inhaler Inhale 2 puffs 2 (two) times a day Rinse mouth after use., Starting Tue 12/20/2022, Normal      Zofran ODT 4 MG disintegrating tablet Take 1 tablet (4 mg total) by mouth every 6 (six) hours as needed for nausea or vomiting, Starting Sun 10/23/2022, Normal       !! - Potential duplicate medications found. Please discuss with provider. No discharge procedures on file. PDMP Review       None             ED Provider  Attending physically available and evaluated Bandar Diaz. I managed the patient along with the ED Attending. Electronically Signed by    Portions of the record may have been created with voice recognition software. Occasional wrong word or "sound a like" substitutions may have occurred due to the inherent limitations of voice recognition software.   Read the chart carefully and recognize, using context, where substitutions have occurred       Jasiel Velazquez MD  10/20/23 [id number]

## 2023-10-30 PROCEDURE — 87086 URINE CULTURE/COLONY COUNT: CPT | Performed by: NURSE PRACTITIONER

## 2023-11-06 ENCOUNTER — HOSPITAL ENCOUNTER (EMERGENCY)
Facility: HOSPITAL | Age: 23
Discharge: HOME | End: 2023-11-06
Attending: EMERGENCY MEDICINE
Payer: COMMERCIAL

## 2023-11-06 VITALS
SYSTOLIC BLOOD PRESSURE: 145 MMHG | DIASTOLIC BLOOD PRESSURE: 65 MMHG | HEART RATE: 85 BPM | TEMPERATURE: 97.9 F | RESPIRATION RATE: 19 BRPM | OXYGEN SATURATION: 97 %

## 2023-11-06 DIAGNOSIS — N20.1 URETEROLITHIASIS: Primary | ICD-10-CM

## 2023-11-06 LAB
ALBUMIN SERPL BCP-MCNC: 4 G/DL (ref 3.5–5)
ALP SERPL-CCNC: 67 U/L (ref 34–104)
ALT SERPL W P-5'-P-CCNC: 6 U/L (ref 7–52)
AMORPH URATE CRY URNS QL MICRO: ABNORMAL
ANION GAP SERPL CALCULATED.3IONS-SCNC: 5 MMOL/L
AST SERPL W P-5'-P-CCNC: 12 U/L (ref 13–39)
ATRIAL RATE: 84 BPM
BACTERIA UR QL AUTO: ABNORMAL /HPF
BASOPHILS # BLD AUTO: 0.08 THOUSANDS/ÂΜL (ref 0–0.1)
BASOPHILS NFR BLD AUTO: 1 % (ref 0–1)
BILIRUB SERPL-MCNC: 0.46 MG/DL (ref 0.2–1)
BILIRUB UR QL STRIP: NEGATIVE
BUN SERPL-MCNC: 8 MG/DL (ref 5–25)
CALCIUM SERPL-MCNC: 8.9 MG/DL (ref 8.4–10.2)
CHLORIDE SERPL-SCNC: 107 MMOL/L (ref 96–108)
CLARITY UR: CLEAR
CO2 SERPL-SCNC: 25 MMOL/L (ref 21–32)
COLOR UR: YELLOW
CREAT SERPL-MCNC: 0.58 MG/DL (ref 0.6–1.3)
EOSINOPHIL # BLD AUTO: 0.29 THOUSAND/ÂΜL (ref 0–0.61)
EOSINOPHIL NFR BLD AUTO: 4 % (ref 0–6)
ERYTHROCYTE [DISTWIDTH] IN BLOOD BY AUTOMATED COUNT: 12.7 % (ref 11.6–15.1)
EXT PREGNANCY TEST URINE: NEGATIVE
EXT. CONTROL: NORMAL
GFR SERPL CREATININE-BSD FRML MDRD: 132 ML/MIN/1.73SQ M
GLUCOSE SERPL-MCNC: 90 MG/DL (ref 65–140)
GLUCOSE UR STRIP-MCNC: NEGATIVE MG/DL
HCT VFR BLD AUTO: 40 % (ref 34.8–46.1)
HGB BLD-MCNC: 13.2 G/DL (ref 11.5–15.4)
HGB UR QL STRIP.AUTO: NEGATIVE
IMM GRANULOCYTES # BLD AUTO: 0.04 THOUSAND/UL (ref 0–0.2)
IMM GRANULOCYTES NFR BLD AUTO: 1 % (ref 0–2)
KETONES UR STRIP-MCNC: NEGATIVE MG/DL
LEUKOCYTE ESTERASE UR QL STRIP: NEGATIVE
LIPASE SERPL-CCNC: 10 U/L (ref 11–82)
LYMPHOCYTES # BLD AUTO: 1.33 THOUSANDS/ÂΜL (ref 0.6–4.47)
LYMPHOCYTES NFR BLD AUTO: 17 % (ref 14–44)
MCH RBC QN AUTO: 29.5 PG (ref 26.8–34.3)
MCHC RBC AUTO-ENTMCNC: 33 G/DL (ref 31.4–37.4)
MCV RBC AUTO: 89 FL (ref 82–98)
MONOCYTES # BLD AUTO: 0.67 THOUSAND/ÂΜL (ref 0.17–1.22)
MONOCYTES NFR BLD AUTO: 8 % (ref 4–12)
NEUTROPHILS # BLD AUTO: 5.67 THOUSANDS/ÂΜL (ref 1.85–7.62)
NEUTS SEG NFR BLD AUTO: 69 % (ref 43–75)
NITRITE UR QL STRIP: NEGATIVE
NON-SQ EPI CELLS URNS QL MICRO: ABNORMAL /HPF
NRBC BLD AUTO-RTO: 0 /100 WBCS
P AXIS: 64 DEGREES
PH UR STRIP.AUTO: 8.5 [PH] (ref 4.5–8)
PLATELET # BLD AUTO: 333 THOUSANDS/UL (ref 149–390)
PMV BLD AUTO: 10 FL (ref 8.9–12.7)
POTASSIUM SERPL-SCNC: 3.8 MMOL/L (ref 3.5–5.3)
PR INTERVAL: 154 MS
PROT SERPL-MCNC: 7 G/DL (ref 6.4–8.4)
PROT UR STRIP-MCNC: ABNORMAL MG/DL
QRS AXIS: 67 DEGREES
QRSD INTERVAL: 74 MS
QT INTERVAL: 360 MS
QTC INTERVAL: 425 MS
RBC # BLD AUTO: 4.48 MILLION/UL (ref 3.81–5.12)
RBC #/AREA URNS AUTO: ABNORMAL /HPF
SODIUM SERPL-SCNC: 137 MMOL/L (ref 135–147)
SP GR UR STRIP.AUTO: 1.02 (ref 1–1.03)
T WAVE AXIS: 35 DEGREES
UROBILINOGEN UR QL STRIP.AUTO: 0.2 E.U./DL
VENTRICULAR RATE: 84 BPM
WBC # BLD AUTO: 8.08 THOUSAND/UL (ref 4.31–10.16)
WBC #/AREA URNS AUTO: ABNORMAL /HPF

## 2023-11-06 PROCEDURE — 81025 URINE PREGNANCY TEST: CPT

## 2023-11-06 PROCEDURE — 99285 EMERGENCY DEPT VISIT HI MDM: CPT | Performed by: EMERGENCY MEDICINE

## 2023-11-06 PROCEDURE — 83690 ASSAY OF LIPASE: CPT

## 2023-11-06 PROCEDURE — 76705 ECHO EXAM OF ABDOMEN: CPT | Performed by: EMERGENCY MEDICINE

## 2023-11-06 PROCEDURE — 80053 COMPREHEN METABOLIC PANEL: CPT

## 2023-11-06 PROCEDURE — 36415 COLL VENOUS BLD VENIPUNCTURE: CPT

## 2023-11-06 PROCEDURE — 96375 TX/PRO/DX INJ NEW DRUG ADDON: CPT

## 2023-11-06 PROCEDURE — 93005 ELECTROCARDIOGRAM TRACING: CPT

## 2023-11-06 PROCEDURE — 93010 ELECTROCARDIOGRAM REPORT: CPT | Performed by: INTERNAL MEDICINE

## 2023-11-06 PROCEDURE — 81001 URINALYSIS AUTO W/SCOPE: CPT

## 2023-11-06 PROCEDURE — 99284 EMERGENCY DEPT VISIT MOD MDM: CPT

## 2023-11-06 PROCEDURE — 96374 THER/PROPH/DIAG INJ IV PUSH: CPT

## 2023-11-06 PROCEDURE — 85025 COMPLETE CBC W/AUTO DIFF WBC: CPT

## 2023-11-06 RX ORDER — KETOROLAC TROMETHAMINE 30 MG/ML
15 INJECTION, SOLUTION INTRAMUSCULAR; INTRAVENOUS ONCE
Status: COMPLETED | OUTPATIENT
Start: 2023-11-06 | End: 2023-11-06

## 2023-11-06 RX ORDER — ONDANSETRON 4 MG/1
4 TABLET, FILM COATED ORAL EVERY 6 HOURS
Qty: 12 TABLET | Refills: 0 | Status: SHIPPED | OUTPATIENT
Start: 2023-11-06

## 2023-11-06 RX ORDER — ONDANSETRON 2 MG/ML
4 INJECTION INTRAMUSCULAR; INTRAVENOUS ONCE
Status: COMPLETED | OUTPATIENT
Start: 2023-11-06 | End: 2023-11-06

## 2023-11-06 RX ORDER — HYDROMORPHONE HCL/PF 1 MG/ML
0.5 SYRINGE (ML) INJECTION ONCE
Status: COMPLETED | OUTPATIENT
Start: 2023-11-06 | End: 2023-11-06

## 2023-11-06 RX ORDER — NAPROXEN 500 MG/1
500 TABLET ORAL 2 TIMES DAILY WITH MEALS
Qty: 14 TABLET | Refills: 0 | Status: SHIPPED | OUTPATIENT
Start: 2023-11-06 | End: 2023-11-13

## 2023-11-06 RX ORDER — TAMSULOSIN HYDROCHLORIDE 0.4 MG/1
0.4 CAPSULE ORAL
Qty: 5 CAPSULE | Refills: 0 | Status: SHIPPED | OUTPATIENT
Start: 2023-11-06 | End: 2023-11-11

## 2023-11-06 RX ADMIN — HYDROMORPHONE HYDROCHLORIDE 0.5 MG: 1 INJECTION, SOLUTION INTRAMUSCULAR; INTRAVENOUS; SUBCUTANEOUS at 12:50

## 2023-11-06 RX ADMIN — ONDANSETRON 4 MG: 2 INJECTION INTRAMUSCULAR; INTRAVENOUS at 12:09

## 2023-11-06 RX ADMIN — KETOROLAC TROMETHAMINE 15 MG: 30 INJECTION, SOLUTION INTRAMUSCULAR; INTRAVENOUS at 12:10

## 2023-11-06 NOTE — DISCHARGE INSTRUCTIONS
You were seen in the ED for ureteral stones. Return to the ED for any worsening symptoms or new symptoms. Follow up with your primary care doctor and urologist as soon as possible.

## 2023-11-06 NOTE — ED PROCEDURE NOTE
Procedure  POC Renal US    Date/Time: 11/6/2023 1:17 PM    Performed by: Elizabeth Marie MD  Authorized by: Elizabeth Marie MD    Patient location:  ED  Performed by:  Resident  Procedure details:     Exam Type:  Diagnostic    Indications: flank/back pain      Assessment for:  Suspected hydronephrosis    Views obtained: left kidney and right kidney      Image quality: diagnostic      Image availability:  Images available in PACS  Findings:     LEFT kidney findings: renal cyst      LEFT hydronephrosis: none      RIGHT kidney findings: renal cyst      RIGHT hydronephrosis: none    Interpretation:     Renal ultrasound impressions: normal exam                     Elizabeth Marie MD  11/07/23 [id number]

## 2023-11-06 NOTE — Clinical Note
Rita Cisneros was seen and treated in our emergency department on 11/6/2023. Diagnosis: kidney stones    Denisse  . She may return on this date: 11/09/2023         If you have any questions or concerns, please don't hesitate to call.       Demetrio Gerardo MD    ______________________________           _______________          _______________  [facility] Representative                              Date                                Time

## 2023-11-06 NOTE — ED PROVIDER NOTES
History  Chief Complaint   Patient presents with    Flank Pain     Thinks she may have a kidney stone. Sharp pain in right flank that is radiating around to front. Hx of kidney stones     79-year-old female patient with history of nephrolithiasis presenting with right-sided flank pain onset yesterday. Patient also having nausea vomiting and diarrhea. Patient states that her pain feels like her kidney stone pain. Patient states that she felt chills but denies any fevers, urinary symptoms, chest pain, shortness of breath.  patient states that she took Tylenol without improvement of symptoms. Patient states that she has been treated for kidney stones in the past.        Prior to Admission Medications   Prescriptions Last Dose Informant Patient Reported? Taking? Multiple Vitamin (Multi-Day) TABS  Self Yes No   Sig: Take 1 tablet by mouth   Probiotic Product (Misc Intestinal Vikki Regulat) CAPS  Self Yes No   Sig: Take by mouth   Symbicort 160-4.5 MCG/ACT inhaler  Self No No   Sig: Inhale 2 puffs 2 (two) times a day Rinse mouth after use.    Zofran ODT 4 MG disintegrating tablet  Self No No   Sig: Take 1 tablet (4 mg total) by mouth every 6 (six) hours as needed for nausea or vomiting   Patient not taking: Reported on 2023   albuterol (PROVENTIL HFA,VENTOLIN HFA) 90 mcg/act inhaler  Self No No   Sig: Inhale 2 puffs every 6 (six) hours as needed for wheezing   ascorbic acid (VITAMIN C) 500 MG tablet  Self Yes No   Sig: Take 500 mg by mouth   bisacodyl (DULCOLAX) 5 mg EC tablet   No No   Sig: Take 2 tablets (10 mg total) by mouth in the morning for 14 doses Colonoscopy prep   Patient not taking: Reported on 2023   busPIRone (BUSPAR) 7.5 mg tablet  Self Yes No   Sig: Take 10 mg by mouth 2 (two) times a day   fluticasone (FLONASE) 50 mcg/act nasal spray  Self No No   Si spray into each nostril daily   ketorolac (TORADOL) 10 mg tablet   No No   Sig: Take 1 tablet (10 mg total) by mouth every 6 (six) hours as needed for moderate pain for up to 5 days   Patient not taking: Reported on 9/26/2023   ketorolac (TORADOL) 10 mg tablet   No No   Sig: Take 1 tablet (10 mg total) by mouth every 6 (six) hours as needed for moderate pain for up to 5 days   Patient not taking: Reported on 9/26/2023   methenamine hippurate (HIPREX) 1 g tablet  Self No No   Sig: Take 1 tablet (1 g total) by mouth 2 (two) times a day with meals   Patient not taking: Reported on 9/26/2023   naproxen (Naprosyn) 500 mg tablet   No No   Sig: Take 1 tablet (500 mg total) by mouth 2 (two) times a day with meals for 5 days   Patient not taking: Reported on 9/26/2023   norethindrone-ethinyl estradiol (Junel 1/20) 1-20 MG-MCG per tablet  Self No No   Sig: Take 1 tablet by mouth daily   Patient not taking: Reported on 6/9/2023   omeprazole (PriLOSEC) 40 MG capsule  Self No No   Sig: Take 1 capsule (40 mg total) by mouth daily   ondansetron (ZOFRAN) 4 mg tablet  Self No No   Sig: Take 1 tablet (4 mg total) by mouth every 6 (six) hours   oxyCODONE (ROXICODONE) 5 immediate release tablet  Self No No   Sig: Take 1 tablet (5 mg total) by mouth every 6 (six) hours as needed for moderate pain for up to 10 doses Max Daily Amount: 20 mg   Patient not taking: Reported on 6/9/2023   oxyCODONE (ROXICODONE) 5 immediate release tablet  Self No No   Sig: Take 1 tablet (5 mg total) by mouth every 6 (six) hours as needed for moderate pain for up to 10 doses Max Daily Amount: 20 mg   Patient not taking: Reported on 6/9/2023   oxybutynin (DITROPAN) 5 mg tablet  Self No No   Sig: Take 1 tablet (5 mg total) by mouth 3 (three) times a day as needed (bladder spasm)   Patient not taking: Reported on 6/9/2023   oxybutynin (DITROPAN) 5 mg tablet  Self No No   Sig: Take 1 tablet (5 mg total) by mouth 3 (three) times a day as needed (bladder spasm)   Patient not taking: Reported on 6/9/2023   phenazopyridine (PYRIDIUM) 200 mg tablet  Self No No   Sig: Take 1 tablet (200 mg total) by mouth 3 (three) times a day as needed for bladder spasms   phenazopyridine (PYRIDIUM) 200 mg tablet  Self No No   Sig: Take 1 tablet (200 mg total) by mouth 3 (three) times a day as needed for bladder spasms   Patient not taking: Reported on 9/26/2023   potassium citrate (UROCIT-K) 10 mEq  Self No No   Sig: TAKE 1 TABLET BY MOUTH 3 TIMES A DAY WITH MEALS   promethazine (PHENERGAN) 12.5 MG tablet  Self No No   Sig: Take 1 tablet (12.5 mg total) by mouth every 6 (six) hours as needed for nausea or vomiting   sucralfate (CARAFATE) 1 g tablet  Self No No   Sig: Take 1 tablet (1 g total) by mouth 4 (four) times a day for 12 doses   Patient not taking: Reported on 9/26/2023      Facility-Administered Medications: None       Past Medical History:   Diagnosis Date    Exercise-induced asthma     Kidney stone     Ovarian cyst        Past Surgical History:   Procedure Laterality Date    APPENDECTOMY      CA CYSTO/URETERO W/LITHOTRIPSY &INDWELL STENT INSRT Right 5/29/2023    Procedure: CYSTOSCOPY URETEROSCOPY WITH LITHOTRIPSY HOLMIUM LASER,  INSERTION STENT URETERAL;  Surgeon: Jose Roberto Cuevas MD;  Location: [facility];  Service: Urology    UPPER GASTROINTESTINAL ENDOSCOPY      WISDOM TOOTH EXTRACTION      WISDOM TOOTH EXTRACTION         Family History   Problem Relation Age of Onset    No Known Problems Father     No Known Problems Mother     Heart disease Maternal Grandfather      I have reviewed and agree with the history as documented. E-Cigarette/Vaping    E-Cigarette Use Never User      E-Cigarette/Vaping Substances    Nicotine No     THC No     CBD No     Flavoring No     Other No     Unknown No      Social History     Tobacco Use    Smoking status: Never    Smokeless tobacco: Never   Vaping Use    Vaping Use: Never used   Substance Use Topics    Alcohol use: Yes     Comment: social     Drug use: Never        Review of Systems   Gastrointestinal:  Positive for diarrhea, nausea and vomiting.    Genitourinary:  Positive for flank pain. All other systems reviewed and are negative. Physical Exam  ED Triage Vitals [11/06/23 1145]   Temperature Pulse Respirations Blood Pressure SpO2   97.9 °F (36.6 °C) 85 19 145/65 97 %      Temp Source Heart Rate Source Patient Position - Orthostatic VS BP Location FiO2 (%)   Oral Monitor Lying Left arm --      Pain Score       5             Orthostatic Vital Signs  Vitals:    11/06/23 1145   BP: 145/65   Pulse: 85   Patient Position - Orthostatic VS: Lying       Physical Exam  Vitals reviewed. Constitutional:       Appearance: Normal appearance. HENT:      Head: Normocephalic and atraumatic. Nose: Nose normal.      Mouth/Throat:      Mouth: Mucous membranes are moist.      Pharynx: Oropharynx is clear. Eyes:      Extraocular Movements: Extraocular movements intact. Conjunctiva/sclera: Conjunctivae normal.   Cardiovascular:      Rate and Rhythm: Normal rate and regular rhythm. Pulses: Normal pulses. Heart sounds: Normal heart sounds. Pulmonary:      Effort: Pulmonary effort is normal.      Breath sounds: Normal breath sounds. Abdominal:      General: Bowel sounds are normal.      Palpations: Abdomen is soft. Tenderness: There is no abdominal tenderness. There is right CVA tenderness. Musculoskeletal:         General: Normal range of motion. Cervical back: Normal range of motion. Skin:     General: Skin is warm and dry. Neurological:      General: No focal deficit present. Mental Status: She is alert and oriented to person, place, and time. Mental status is at baseline.          ED Medications  Medications   ketorolac (TORADOL) injection 15 mg (15 mg Intravenous Given 11/6/23 1210)   ondansetron (ZOFRAN) injection 4 mg (4 mg Intravenous Given 11/6/23 1209)   HYDROmorphone (DILAUDID) injection 0.5 mg (0.5 mg Intravenous Given 11/6/23 1250)       Diagnostic Studies  Results Reviewed       Procedure Component Value Units Date/Time    Comprehensive metabolic panel [[id number]]  (Abnormal) Collected: 11/06/23 1208    Lab Status: Final result Specimen: Blood from Arm, Right Updated: 11/06/23 1247     Sodium 137 mmol/L      Potassium 3.8 mmol/L      Chloride 107 mmol/L      CO2 25 mmol/L      ANION GAP 5 mmol/L      BUN 8 mg/dL      Creatinine 0.58 mg/dL      Glucose 90 mg/dL      Calcium 8.9 mg/dL      AST 12 U/L      ALT 6 U/L      Alkaline Phosphatase 67 U/L      Total Protein 7.0 g/dL      Albumin 4.0 g/dL      Total Bilirubin 0.46 mg/dL      eGFR 132 ml/min/1.73sq m     Narrative:      National Kidney Disease Foundation guidelines for Chronic Kidney Disease (CKD):     Stage 1 with normal or high GFR (GFR > 90 mL/min/1.73 square meters)    Stage 2 Mild CKD (GFR = 60-89 mL/min/1.73 square meters)    Stage 3A Moderate CKD (GFR = 45-59 mL/min/1.73 square meters)    Stage 3B Moderate CKD (GFR = 30-44 mL/min/1.73 square meters)    Stage 4 Severe CKD (GFR = 15-29 mL/min/1.73 square meters)    Stage 5 End Stage CKD (GFR <15 mL/min/1.73 square meters)  Note: GFR calculation is accurate only with a steady state creatinine    Lipase [[id number]]  (Abnormal) Collected: 11/06/23 1208    Lab Status: Final result Specimen: Blood from Arm, Right Updated: 11/06/23 1247     Lipase 10 u/L     CBC and differential [[id number]] Collected: 11/06/23 1208    Lab Status: Final result Specimen: Blood from Arm, Right Updated: 11/06/23 1235     WBC 8.08 Thousand/uL      RBC 4.48 Million/uL      Hemoglobin 13.2 g/dL      Hematocrit 40.0 %      MCV 89 fL      MCH 29.5 pg      MCHC 33.0 g/dL      RDW 12.7 %      MPV 10.0 fL      Platelets 022 Thousands/uL      nRBC 0 /100 WBCs      Neutrophils Relative 69 %      Immat GRANS % 1 %      Lymphocytes Relative 17 %      Monocytes Relative 8 %      Eosinophils Relative 4 %      Basophils Relative 1 %      Neutrophils Absolute 5.67 Thousands/µL      Immature Grans Absolute 0.04 Thousand/uL      Lymphocytes Absolute 1.33 Thousands/µL      Monocytes Absolute 0.67 Thousand/µL      Eosinophils Absolute 0.29 Thousand/µL      Basophils Absolute 0.08 Thousands/µL     Urine Microscopic [[id number]]  (Abnormal) Collected: 11/06/23 1213    Lab Status: Final result Specimen: Urine, Other Updated: 11/06/23 1229     RBC, UA 1-2 /hpf      WBC, UA 2-4 /hpf      Epithelial Cells Occasional /hpf      Bacteria, UA Occasional /hpf      Amorphous Crystals, UA Moderate    POCT pregnancy, urine [[id number]]  (Normal) Resulted: 11/06/23 1218    Lab Status: Final result Updated: 11/06/23 1218     EXT Preg Test, Ur Negative     Control Valid    Urine Macroscopic, POC [[id number]]  (Abnormal) Collected: 11/06/23 1213    Lab Status: Final result Specimen: Urine Updated: 11/06/23 1214     Color, UA Yellow     Clarity, UA Clear     pH, UA 8.5     Leukocytes, UA Negative     Nitrite, UA Negative     Protein, UA Trace mg/dl      Glucose, UA Negative mg/dl      Ketones, UA Negative mg/dl      Urobilinogen, UA 0.2 E.U./dl      Bilirubin, UA Negative     Occult Blood, UA Negative     Specific Gravity, UA 1.020    Narrative:      CLINITEK RESULT                   No orders to display         Procedures  Procedures      ED Course                             SBIRT 20yo+      Flowsheet Row Most Recent Value   Initial Alcohol Screen: US AUDIT-C     1. How often do you have a drink containing alcohol? 0 Filed at: 11/06/2023 1223   2. How many drinks containing alcohol do you have on a typical day you are drinking? 0 Filed at: 11/06/2023 1223   3a. Male UNDER 65: How often do you have five or more drinks on one occasion? 0 Filed at: 11/06/2023 1223   3b. FEMALE Any Age, or MALE 65+: How often do you have 4 or more drinks on one occassion? 0 Filed at: 11/06/2023 1223   Audit-C Score 0 Filed at: 11/06/2023 1223   MILADY: How many times in the past year have you. .. Used an illegal drug or used a prescription medication for non-medical reasons?  Never Filed at: 11/06/2023 1223                  Medical Decision Making  24-year-old female patient presenting with right flank pain. Patient has a history of kidney stones. Patient also having nausea vomiting and loose stools. DDx includes viral gastroenteritis, pyelonephritis, nephrolithiasis. Likely nephrolithiasis. Patient was treated with Toradol and then Dilaudid with improvement of symptoms. Urine negative for UTI. Labs otherwise within normal limits, creatinine 0.58. Bedside ultrasound of kidney showed a cyst however no signs of hydronephrosis. No obstructive process evident on ultrasound at this time. Safe for discharge with follow-up with urology. Return precautions given. Patient discharged with Zofran and naproxen. Amount and/or Complexity of Data Reviewed  Labs: ordered. Discussion of management or test interpretation with external provider(s): Follow-up with urologist.    Risk  Prescription drug management. Disposition  Final diagnoses:   Ureterolithiasis     Time reflects when diagnosis was documented in both MDM as applicable and the Disposition within this note       Time User Action Codes Description Comment    11/6/2023  1:14 PM Ary, [address] [facility] [N20.0] Nephrolithiasis     11/6/2023  1:14 PM Clemetine Music Benja Remove [N20.0] Nephrolithiasis     11/6/2023  1:14 PM Lisa Peña Add [N20.1] Ureterolithiasis           ED Disposition       ED Disposition   Discharge    Condition   Stable    Date/Time   Mon Nov 6, 2023 [place] discharge to home/self care.                    Follow-up Information       Follow up With Specialties Details Why Contact Info Additional [address] [facility], [address] Surgical, Nurse Practitioner Schedule an appointment as soon as possible for a visit   1 [facility] [address] [facility] [address] [facility] Schedule an appointment as soon as possible for a visit   629 [facility] 3541 [facility] [address] [place] [address] Urology [facility], [facility]            Discharge Medication List as of 11/6/2023  1:17 PM        START taking these medications    Details   !! ondansetron (ZOFRAN) 4 mg tablet Take 1 tablet (4 mg total) by mouth every 6 (six) hours, Starting Mon 11/6/2023, Normal      tamsulosin (FLOMAX) 0.4 mg Take 1 capsule (0.4 mg total) by mouth daily with dinner for 5 days, Starting Mon 11/6/2023, Until Sat 11/11/2023, Normal       !! - Potential duplicate medications found. Please discuss with provider.         CONTINUE these medications which have CHANGED    Details   naproxen (Naprosyn) 500 mg tablet Take 1 tablet (500 mg total) by mouth 2 (two) times a day with meals for 7 days, Starting Mon 11/6/2023, Until Mon 11/13/2023, Normal           CONTINUE these medications which have NOT CHANGED    Details   albuterol (PROVENTIL HFA,VENTOLIN HFA) 90 mcg/act inhaler Inhale 2 puffs every 6 (six) hours as needed for wheezing, Starting Mon 9/11/2023, Normal      ascorbic acid (VITAMIN C) 500 MG tablet Take 500 mg by mouth, Historical Med      bisacodyl (DULCOLAX) 5 mg EC tablet Take 2 tablets (10 mg total) by mouth in the morning for 14 doses Colonoscopy prep, Starting Thu 11/17/2022, Until Thu 12/1/2022, Normal      busPIRone (BUSPAR) 7.5 mg tablet Take 10 mg by mouth 2 (two) times a day, Starting Mon 3/20/2023, Historical Med      fluticasone (FLONASE) 50 mcg/act nasal spray 1 spray into each nostril daily, Starting Thu 9/1/2022, Normal      ketorolac (TORADOL) 10 mg tablet Take 1 tablet (10 mg total) by mouth every 6 (six) hours as needed for moderate pain for up to 5 days, Starting Mon 5/29/2023, Until Sat 6/3/2023 at 2359, Normal      ketorolac (TORADOL) 10 mg tablet Take 1 tablet (10 mg total) by mouth every 6 (six) hours as needed for moderate pain for up to 5 days, Starting Mon 5/29/2023, Until Sat 6/3/2023 at 2359, Normal      methenamine hippurate (HIPREX) 1 g tablet Take 1 tablet (1 g total) by mouth 2 (two) times a day with meals, Starting Fri 6/9/2023, Print      Multiple Vitamin (Multi-Day) TABS Take 1 tablet by mouth, Historical Med      norethindrone-ethinyl estradiol (Junel 1/20) 1-20 MG-MCG per tablet Take 1 tablet by mouth daily, Starting Wed 11/2/2022, Normal      omeprazole (PriLOSEC) 40 MG capsule Take 1 capsule (40 mg total) by mouth daily, Starting Wed 3/29/2023, Until Tue 9/26/2023, Normal      !! ondansetron (ZOFRAN) 4 mg tablet Take 1 tablet (4 mg total) by mouth every 6 (six) hours, Starting Tue 9/5/2023, Normal      !! oxybutynin (DITROPAN) 5 mg tablet Take 1 tablet (5 mg total) by mouth 3 (three) times a day as needed (bladder spasm), Starting Mon 5/29/2023, Normal      !! oxybutynin (DITROPAN) 5 mg tablet Take 1 tablet (5 mg total) by mouth 3 (three) times a day as needed (bladder spasm), Starting Mon 5/29/2023, Normal      !! oxyCODONE (ROXICODONE) 5 immediate release tablet Take 1 tablet (5 mg total) by mouth every 6 (six) hours as needed for moderate pain for up to 10 doses Max Daily Amount: 20 mg, Starting Mon 5/29/2023, Normal      !! oxyCODONE (ROXICODONE) 5 immediate release tablet Take 1 tablet (5 mg total) by mouth every 6 (six) hours as needed for moderate pain for up to 10 doses Max Daily Amount: 20 mg, Starting Mon 5/29/2023, Normal      !! phenazopyridine (PYRIDIUM) 200 mg tablet Take 1 tablet (200 mg total) by mouth 3 (three) times a day as needed for bladder spasms, Starting Mon 5/29/2023, Normal      !! phenazopyridine (PYRIDIUM) 200 mg tablet Take 1 tablet (200 mg total) by mouth 3 (three) times a day as needed for bladder spasms, Starting Mon 5/29/2023, Normal      potassium citrate (UROCIT-K) 10 mEq TAKE 1 TABLET BY MOUTH 3 TIMES A DAY WITH MEALS, Starting Tue 9/26/2023, Normal      Probiotic Product (Misc Intestinal Vikki Regulat) CAPS Take by mouth, Historical Med      promethazine (PHENERGAN) 12.5 MG tablet Take 1 tablet (12.5 mg total) by mouth every 6 (six) hours as needed for nausea or vomiting, Starting Tue 10/25/2022, Normal      sucralfate (CARAFATE) 1 g tablet Take 1 tablet (1 g total) by mouth 4 (four) times a day for 12 doses, Starting Sun 10/23/2022, Until Wed 10/26/2022, Normal      Symbicort 160-4.5 MCG/ACT inhaler Inhale 2 puffs 2 (two) times a day Rinse mouth after use., Starting Tue 12/20/2022, Normal      Zofran ODT 4 MG disintegrating tablet Take 1 tablet (4 mg total) by mouth every 6 (six) hours as needed for nausea or vomiting, Starting Sun 10/23/2022, Normal       !! - Potential duplicate medications found. Please discuss with provider. PDMP Review       None             ED Provider  Attending physically available and evaluated [place]. I managed the patient along with the ED Attending.     Electronically Signed by           So Ellsworth MD  11/07/23 8780

## 2023-11-06 NOTE — ED ATTENDING ATTESTATION
11/6/2023  I, Isi Fung MD, saw and evaluated the patient. I have discussed the patient with the resident/non-physician practitioner and agree with the resident's/non-physician practitioner's findings, Plan of Care, and MDM as documented in the resident's/non-physician practitioner's note, except where noted. All available labs and Radiology studies were reviewed. I was present for key portions of any procedure(s) performed by the resident/non-physician practitioner and I was immediately available to provide assistance. At this point I agree with the current assessment done in the Emergency Department. I have conducted an independent evaluation of this patient a history and physical is as follows:  Patient here with abdominal pain and kidney pain. Patient states that her abdominal pain is started in her mid abdomen, started slowly and got worse, has been present for about 1 day. Kidney pain started during the evening. Patient has history of kidney stones with numerous prior CTs demonstrating numerous kidney stones. Patient states the pain in her kidney feels like her kidney stones. The patient states that she has had a couple episodes of vomiting and a little diarrhea. The abdominal pain does not feel like a kidney stone. She states that it is aching, and that she had a low-grade temperature today. On exam patient is awake, alert, interactive. She is well-appearing. HEENT exam is unremarkable. Patient is nontoxic. Heart is regular without murmurs, rubs, gallop. Lungs are clear with good air movement. Abdomen is soft. There is no significant abdominal tenderness, rebound, or guarding. Patient has some left CVA tenderness. MEDICAL DECISION MAKING    Number and Complexity of Problems  Differential diagnosis: Nephrolithiasis, viral illness, gastroenteritis, infected stone    Medical Decision Making Data  External documents reviewed: Patient's prior CTs reviewed, CT in September demonstrating numerous kidney stones  My EKG interpretation:   My CT interpretation:   My X-ray interpretation:   My ultrasound interpretation: No hydronephrosis, does have renal cysts    No orders to display       Labs Reviewed   COMPREHENSIVE METABOLIC PANEL - Abnormal       Result Value Ref Range Status    Sodium 137  135 - 147 mmol/L Final    Potassium 3.8  3.5 - 5.3 mmol/L Final    Chloride 107  96 - 108 mmol/L Final    CO2 25  21 - 32 mmol/L Final    ANION GAP 5  mmol/L Final    BUN 8  5 - 25 mg/dL Final    Creatinine 0.58 (*) 0.60 - 1.30 mg/dL Final    Comment: Standardized to IDMS reference method    Glucose 90  65 - 140 mg/dL Final    Comment: If the patient is fasting, the ADA then defines impaired fasting glucose as > 100 mg/dL and diabetes as > or equal to 123 mg/dL. Calcium 8.9  8.4 - 10.2 mg/dL Final    AST 12 (*) 13 - 39 U/L Final    ALT 6 (*) 7 - 52 U/L Final    Comment: Specimen collection should occur prior to Sulfasalazine administration due to the potential for falsely depressed results. Alkaline Phosphatase 67  34 - 104 U/L Final    Total Protein 7.0  6.4 - 8.4 g/dL Final    Albumin 4.0  3.5 - 5.0 g/dL Final    Total Bilirubin 0.46  0.20 - 1.00 mg/dL Final    Comment: Use of this assay is not recommended for patients undergoing treatment with eltrombopag due to the potential for falsely elevated results. N-acetyl-p-benzoquinone imine (metabolite of Acetaminophen) will generate erroneously low results in samples for patients that have taken an overdose of Acetaminophen.     eGFR 132  ml/min/1.73sq m Final    Narrative:     [place] guidelines for Chronic Kidney Disease (CKD):     Stage 1 with normal or high GFR (GFR > 90 mL/min/1.73 square meters)    Stage 2 Mild CKD (GFR = 60-89 mL/min/1.73 square meters)    Stage 3A Moderate CKD (GFR = 45-59 mL/min/1.73 square meters)    Stage 3B Moderate CKD (GFR = 30-44 mL/min/1.73 square meters)    Stage 4 Severe CKD (GFR = 15-29 mL/min/1.73 square meters)    Stage 5 End Stage CKD (GFR <15 mL/min/1.73 square meters)  Note: GFR calculation is accurate only with a steady state creatinine   LIPASE - Abnormal    Lipase 10 (*) 11 - 82 u/L Final   URINE MICROSCOPIC - Abnormal    RBC, UA 1-2  None Seen, 1-2 /hpf Final    WBC, UA 2-4 (*) None Seen, 1-2 /hpf Final    Epithelial Cells Occasional  None Seen, Occasional /hpf Final    Bacteria, UA Occasional  None Seen, Occasional /hpf Final    Amorphous Crystals, UA Moderate   Final   URINE MACROSCOPIC, POC - Abnormal    Color, UA Yellow   Final    Clarity, UA Clear   Final    pH, UA 8.5 (*) 4.5 - 8.0 Final    Leukocytes, UA Negative  Negative Final    Nitrite, UA Negative  Negative Final    Protein, UA Trace (*) Negative mg/dl Final    Glucose, UA Negative  Negative mg/dl Final    Ketones, UA Negative  Negative mg/dl Final    Urobilinogen, UA 0.2  0.2, 1.0 E.U./dl E.U./dl Final    Bilirubin, UA Negative  Negative Final    Occult Blood, UA Negative  Negative Final    Specific Gravity, UA 1.020  1.003 - 1.030 Final    Narrative:     CLINITEK RESULT   POCT PREGNANCY, URINE - Normal    EXT Preg Test, Ur Negative   Final    Control Valid   Final   CBC AND DIFFERENTIAL    WBC 8.08  4.31 - 10.16 Thousand/uL Final    RBC 4.48  3.81 - 5.12 Million/uL Final    Hemoglobin 13.2  11.5 - 15.4 g/dL Final    Hematocrit 40.0  34.8 - 46.1 % Final    MCV 89  82 - 98 fL Final    MCH 29.5  26.8 - 34.3 pg Final    MCHC 33.0  31.4 - 37.4 g/dL Final    RDW 12.7  11.6 - 15.1 % Final    MPV 10.0  8.9 - 12.7 fL Final    Platelets 885  899 - 390 Thousands/uL Final    nRBC 0  /100 WBCs Final    Neutrophils Relative 69  43 - 75 % Final    Immat GRANS % 1  0 - 2 % Final    Lymphocytes Relative 17  14 - 44 % Final    Monocytes Relative 8  4 - 12 % Final    Eosinophils Relative 4  0 - 6 % Final    Basophils Relative 1  0 - 1 % Final    Neutrophils Absolute 5.67  1.85 - 7.62 Thousands/µL Final    Immature Grans Absolute 0.04 0.00 - 0.20 Thousand/uL Final    Lymphocytes Absolute 1.33  0.60 - 4.47 Thousands/µL Final    Monocytes Absolute 0.67  0.17 - 1.22 Thousand/µL Final    Eosinophils Absolute 0.29  0.00 - 0.61 Thousand/µL Final    Basophils Absolute 0.08  0.00 - 0.10 Thousands/µL Final       Labs reviewed by me are significant for: Urine with no evidence of infection, no renal failure    Clinical decision rules/scores are significant for:     Discussed case with:   Considered admission for:     Treatment and Disposition  ED course: Patient with epigastric pain, but benign exam, flank pain. We will plan to check labs to rule out pancreatitis. Patient does not have a surgical abdominal exam, therefore will defer a CAT scan as patient has had numerous prior CAT scans and would like to spare her the radiation.   We will perform a bedside ultrasound to rule out hydronephrosis, will check urine to rule out superinfected stone  Shared decision making:   Code status:     ED Course         Critical Care Time  Procedures

## 2023-12-09 ENCOUNTER — APPOINTMENT (EMERGENCY)
Dept: RADIOLOGY | Facility: HOSPITAL | Age: 23
End: 2023-12-09
Payer: COMMERCIAL

## 2023-12-09 ENCOUNTER — HOSPITAL ENCOUNTER (EMERGENCY)
Facility: HOSPITAL | Age: 23
Discharge: HOME | End: 2023-12-09
Attending: EMERGENCY MEDICINE
Payer: COMMERCIAL

## 2023-12-09 VITALS
SYSTOLIC BLOOD PRESSURE: 108 MMHG | DIASTOLIC BLOOD PRESSURE: 58 MMHG | TEMPERATURE: 98.6 F | WEIGHT: 241 LBS | RESPIRATION RATE: 18 BRPM | BODY MASS INDEX: 34.58 KG/M2 | HEART RATE: 70 BPM | OXYGEN SATURATION: 96 %

## 2023-12-09 DIAGNOSIS — N12 PYELONEPHRITIS: Primary | ICD-10-CM

## 2023-12-09 DIAGNOSIS — R11.0 NAUSEA: ICD-10-CM

## 2023-12-09 LAB
ANION GAP SERPL CALCULATED.3IONS-SCNC: 7 MMOL/L
BACTERIA UR QL AUTO: ABNORMAL /HPF
BASOPHILS # BLD AUTO: 0.11 THOUSANDS/ÂΜL (ref 0–0.1)
BASOPHILS NFR BLD AUTO: 1 % (ref 0–1)
BILIRUB UR QL STRIP: NEGATIVE
BUN SERPL-MCNC: 11 MG/DL (ref 5–25)
CALCIUM SERPL-MCNC: 9.4 MG/DL (ref 8.4–10.2)
CHLORIDE SERPL-SCNC: 107 MMOL/L (ref 96–108)
CLARITY UR: ABNORMAL
CO2 SERPL-SCNC: 24 MMOL/L (ref 21–32)
COLOR UR: ABNORMAL
CREAT SERPL-MCNC: 0.59 MG/DL (ref 0.6–1.3)
EOSINOPHIL # BLD AUTO: 0.59 THOUSAND/ÂΜL (ref 0–0.61)
EOSINOPHIL NFR BLD AUTO: 6 % (ref 0–6)
ERYTHROCYTE [DISTWIDTH] IN BLOOD BY AUTOMATED COUNT: 12.8 % (ref 11.6–15.1)
EXT PREGNANCY TEST URINE: NEGATIVE
EXT. CONTROL: NORMAL
GFR SERPL CREATININE-BSD FRML MDRD: 131 ML/MIN/1.73SQ M
GLUCOSE SERPL-MCNC: 79 MG/DL (ref 65–140)
GLUCOSE UR STRIP-MCNC: NEGATIVE MG/DL
HCT VFR BLD AUTO: 40.7 % (ref 34.8–46.1)
HGB BLD-MCNC: 13.5 G/DL (ref 11.5–15.4)
HGB UR QL STRIP.AUTO: ABNORMAL
IMM GRANULOCYTES # BLD AUTO: 0.06 THOUSAND/UL (ref 0–0.2)
IMM GRANULOCYTES NFR BLD AUTO: 1 % (ref 0–2)
KETONES UR STRIP-MCNC: NEGATIVE MG/DL
LEUKOCYTE ESTERASE UR QL STRIP: ABNORMAL
LYMPHOCYTES # BLD AUTO: 2.51 THOUSANDS/ÂΜL (ref 0.6–4.47)
LYMPHOCYTES NFR BLD AUTO: 24 % (ref 14–44)
MCH RBC QN AUTO: 29.5 PG (ref 26.8–34.3)
MCHC RBC AUTO-ENTMCNC: 33.2 G/DL (ref 31.4–37.4)
MCV RBC AUTO: 89 FL (ref 82–98)
MONOCYTES # BLD AUTO: 0.92 THOUSAND/ÂΜL (ref 0.17–1.22)
MONOCYTES NFR BLD AUTO: 9 % (ref 4–12)
MUCOUS THREADS UR QL AUTO: ABNORMAL
NEUTROPHILS # BLD AUTO: 6.4 THOUSANDS/ÂΜL (ref 1.85–7.62)
NEUTS SEG NFR BLD AUTO: 59 % (ref 43–75)
NITRITE UR QL STRIP: NEGATIVE
NON-SQ EPI CELLS URNS QL MICRO: ABNORMAL /HPF
NRBC BLD AUTO-RTO: 0 /100 WBCS
PH UR STRIP.AUTO: 6 [PH]
PLATELET # BLD AUTO: 390 THOUSANDS/UL (ref 149–390)
PMV BLD AUTO: 10 FL (ref 8.9–12.7)
POTASSIUM SERPL-SCNC: 4.4 MMOL/L (ref 3.5–5.3)
PROT UR STRIP-MCNC: ABNORMAL MG/DL
RBC # BLD AUTO: 4.58 MILLION/UL (ref 3.81–5.12)
RBC #/AREA URNS AUTO: ABNORMAL /HPF
SODIUM SERPL-SCNC: 138 MMOL/L (ref 135–147)
SP GR UR STRIP.AUTO: 1.02 (ref 1–1.03)
UROBILINOGEN UR STRIP-ACNC: <2 MG/DL
WBC # BLD AUTO: 10.59 THOUSAND/UL (ref 4.31–10.16)
WBC #/AREA URNS AUTO: ABNORMAL /HPF

## 2023-12-09 PROCEDURE — 96375 TX/PRO/DX INJ NEW DRUG ADDON: CPT

## 2023-12-09 PROCEDURE — 36415 COLL VENOUS BLD VENIPUNCTURE: CPT

## 2023-12-09 PROCEDURE — 85025 COMPLETE CBC W/AUTO DIFF WBC: CPT

## 2023-12-09 PROCEDURE — 80048 BASIC METABOLIC PNL TOTAL CA: CPT

## 2023-12-09 PROCEDURE — 87147 CULTURE TYPE IMMUNOLOGIC: CPT

## 2023-12-09 PROCEDURE — 99284 EMERGENCY DEPT VISIT MOD MDM: CPT

## 2023-12-09 PROCEDURE — G1004 CDSM NDSC: HCPCS

## 2023-12-09 PROCEDURE — 99284 EMERGENCY DEPT VISIT MOD MDM: CPT | Performed by: EMERGENCY MEDICINE

## 2023-12-09 PROCEDURE — 74176 CT ABD & PELVIS W/O CONTRAST: CPT

## 2023-12-09 PROCEDURE — 81001 URINALYSIS AUTO W/SCOPE: CPT

## 2023-12-09 PROCEDURE — 87086 URINE CULTURE/COLONY COUNT: CPT

## 2023-12-09 PROCEDURE — 96361 HYDRATE IV INFUSION ADD-ON: CPT

## 2023-12-09 PROCEDURE — 96374 THER/PROPH/DIAG INJ IV PUSH: CPT

## 2023-12-09 PROCEDURE — 81025 URINE PREGNANCY TEST: CPT

## 2023-12-09 RX ORDER — ONDANSETRON 4 MG/1
4 TABLET, FILM COATED ORAL EVERY 6 HOURS
Qty: 12 TABLET | Refills: 0 | Status: SHIPPED | OUTPATIENT
Start: 2023-12-09

## 2023-12-09 RX ORDER — CIPROFLOXACIN 500 MG/1
500 TABLET, FILM COATED ORAL 2 TIMES DAILY
Qty: 14 TABLET | Refills: 0 | Status: SHIPPED | OUTPATIENT
Start: 2023-12-09 | End: 2023-12-19

## 2023-12-09 RX ORDER — HYDROMORPHONE HCL/PF 1 MG/ML
0.5 SYRINGE (ML) INJECTION ONCE
Status: COMPLETED | OUTPATIENT
Start: 2023-12-09 | End: 2023-12-09

## 2023-12-09 RX ORDER — KETOROLAC TROMETHAMINE 30 MG/ML
15 INJECTION, SOLUTION INTRAMUSCULAR; INTRAVENOUS ONCE
Status: COMPLETED | OUTPATIENT
Start: 2023-12-09 | End: 2023-12-09

## 2023-12-09 RX ORDER — ONDANSETRON 2 MG/ML
4 INJECTION INTRAMUSCULAR; INTRAVENOUS ONCE
Status: COMPLETED | OUTPATIENT
Start: 2023-12-09 | End: 2023-12-09

## 2023-12-09 RX ORDER — FLUCONAZOLE 200 MG/1
200 TABLET ORAL ONCE
Status: COMPLETED | OUTPATIENT
Start: 2023-12-09 | End: 2023-12-09

## 2023-12-09 RX ORDER — CIPROFLOXACIN 500 MG/1
500 TABLET, FILM COATED ORAL ONCE
Status: COMPLETED | OUTPATIENT
Start: 2023-12-09 | End: 2023-12-09

## 2023-12-09 RX ORDER — HYDROMORPHONE HCL IN WATER/PF 6 MG/30 ML
0.2 PATIENT CONTROLLED ANALGESIA SYRINGE INTRAVENOUS ONCE
Status: COMPLETED | OUTPATIENT
Start: 2023-12-09 | End: 2023-12-09

## 2023-12-09 RX ORDER — ACETAMINOPHEN 325 MG/1
650 TABLET ORAL ONCE
Status: COMPLETED | OUTPATIENT
Start: 2023-12-09 | End: 2023-12-09

## 2023-12-09 RX ADMIN — ACETAMINOPHEN 650 MG: 325 TABLET, FILM COATED ORAL at 15:34

## 2023-12-09 RX ADMIN — CIPROFLOXACIN 500 MG: 500 TABLET, FILM COATED ORAL at 18:15

## 2023-12-09 RX ADMIN — ONDANSETRON 4 MG: 2 INJECTION INTRAMUSCULAR; INTRAVENOUS at 15:15

## 2023-12-09 RX ADMIN — HYDROMORPHONE HYDROCHLORIDE 0.5 MG: 1 INJECTION, SOLUTION INTRAMUSCULAR; INTRAVENOUS; SUBCUTANEOUS at 15:52

## 2023-12-09 RX ADMIN — FLUCONAZOLE 200 MG: 200 TABLET ORAL at 18:15

## 2023-12-09 RX ADMIN — SODIUM CHLORIDE 1000 ML: 0.9 INJECTION, SOLUTION INTRAVENOUS at 15:15

## 2023-12-09 RX ADMIN — HYDROMORPHONE HYDROCHLORIDE 0.2 MG: 0.2 INJECTION, SOLUTION INTRAMUSCULAR; INTRAVENOUS; SUBCUTANEOUS at 17:40

## 2023-12-09 RX ADMIN — KETOROLAC TROMETHAMINE 15 MG: 30 INJECTION, SOLUTION INTRAMUSCULAR at 15:15

## 2023-12-09 NOTE — ED ATTENDING ATTESTATION
12/9/2023  IViky MD, saw and evaluated the patient. I have discussed the patient with the resident/non-physician practitioner and agree with the resident's/non-physician practitioner's findings, Plan of Care, and MDM as documented in the resident's/non-physician practitioner's note, except where noted. All available labs and Radiology studies were reviewed. I was present for key portions of any procedure(s) performed by the resident/non-physician practitioner and I was immediately available to provide assistance. At this point I agree with the current assessment done in the Emergency Department. I have conducted an independent evaluation of this patient a history and physical is as follows:    ED Course         Critical Care Time  Procedures    27 yo female with labial rash for five days, treating as fungal with otc meds. Pt since yesterday having increased dysuria, hematuria, and having right flank pain similar to previous kidney stone. Pt currently on period. No vaginal discharge. Pt is sexually active. Vss, afebrile, lungs cta, rrr, abdomen soft suprapubic tenderness, right cva tenderness. Stone study, urine, external genital exam, rule out fungal infection, pyelo, stone. Pain meds.

## 2023-12-09 NOTE — DISCHARGE INSTRUCTIONS
Cipro for 10 days  Zofran for 3 days  Tylenol motrin for pain control   "Thin linear 8 mm radiopaque density within the transverse colon, of uncertain etiology.  No focal inflammatory changes or evidence for bowel obstruction."

## 2023-12-09 NOTE — ED PROVIDER NOTES
History  Chief Complaint   Patient presents with    Medical Problem     Pt reports pain, itching, burning, frequency that started Wednesday. Pt states started to have flank pain today, has a history of kidney stones. HPI  MDM  Pt is a 45-year-old female, history of kidney stones and ovarian cysts, presents with labial rash, UTI-like symptoms, flank pain, nausea vomiting. She first developed a labial rash 5 days ago, thought it looked like fungal rash and took over-the-counter medications. She then developed UTI-like symptoms including dysuria and hematuria, with flank pain in the last couple of days. Patient currently on her. .    Initial presentation pt is uncomfortable secondary to pain, nontoxic    On exam   General: VSS, NAD, awake, alert. Talking normally. Head: Normocephalic, atraumatic, nontender. Eyes: EOM-No subconjunctival hemorrhages. ENT: Nose atraumatic. MMM  No malocclusion. No stridor. Normal phonation. No drooling. Normal swallowing. Neck: Trachea midline. No JVD. CV: RRR. Lungs: CTAB No tachypnea. No paradoxical motion. Abd: +BS, soft, mild suprapubic tenderness, nondistended. No guarding/rigidity. Right sided CVA tenderness. MSK: Full ROM throughout. No lower extremity edema. Skin: Dry, intact. Neuro: AAOx3, GCS 15, CN II-XII grossly intact. Motor/sensory grossly intact. Psychiatric/Behavioral: mood/affect normal; behavior normal; thought content normal; judgement normal   Exam: Labial area no signs of infection, mild erythematous papules, consistent with irritation      Ddx: Renal colic, pyelonephritis, UTI, fungal genital rash versus irritation from shaving    Plan: Patient was symptomatically treated for pain. Discharged with a course of Cipro for possible pyelonephritis. Also was given single dose of Diflucan for possible fungal rash. Discharged with outpatient follow-up and strict return precautions.         Final Dispo: Pt is hemodynamically stable and clear for discharge with outpatient f/u with their PCP. Return precautions given pt verbalized understanding,d  Prior to Admission Medications   Prescriptions Last Dose Informant Patient Reported? Taking? Multiple Vitamin (Multi-Day) TABS  Self Yes No   Sig: Take 1 tablet by mouth   Probiotic Product (Misc Intestinal Vikki Regulat) CAPS  Self Yes No   Sig: Take by mouth   Symbicort 160-4.5 MCG/ACT inhaler  Self No No   Sig: Inhale 2 puffs 2 (two) times a day Rinse mouth after use.    Zofran ODT 4 MG disintegrating tablet  Self No No   Sig: Take 1 tablet (4 mg total) by mouth every 6 (six) hours as needed for nausea or vomiting   Patient not taking: Reported on 2023   albuterol (PROVENTIL HFA,VENTOLIN HFA) 90 mcg/act inhaler  Self No No   Sig: Inhale 2 puffs every 6 (six) hours as needed for wheezing   ascorbic acid (VITAMIN C) 500 MG tablet  Self Yes No   Sig: Take 500 mg by mouth   bisacodyl (DULCOLAX) 5 mg EC tablet   No No   Sig: Take 2 tablets (10 mg total) by mouth in the morning for 14 doses Colonoscopy prep   Patient not taking: Reported on 2023   busPIRone (BUSPAR) 7.5 mg tablet  Self Yes No   Sig: Take 10 mg by mouth 2 (two) times a day   fluticasone (FLONASE) 50 mcg/act nasal spray  Self No No   Si spray into each nostril daily   ketorolac (TORADOL) 10 mg tablet   No No   Sig: Take 1 tablet (10 mg total) by mouth every 6 (six) hours as needed for moderate pain for up to 5 days   Patient not taking: Reported on 2023   ketorolac (TORADOL) 10 mg tablet   No No   Sig: Take 1 tablet (10 mg total) by mouth every 6 (six) hours as needed for moderate pain for up to 5 days   Patient not taking: Reported on 2023   methenamine hippurate (HIPREX) 1 g tablet  Self No No   Sig: Take 1 tablet (1 g total) by mouth 2 (two) times a day with meals   Patient not taking: Reported on 2023   naproxen (Naprosyn) 500 mg tablet   No No   Sig: Take 1 tablet (500 mg total) by mouth 2 (two) times a day with meals for 5 days   Patient not taking: Reported on 9/26/2023   naproxen (Naprosyn) 500 mg tablet   No No   Sig: Take 1 tablet (500 mg total) by mouth 2 (two) times a day with meals for 7 days   norethindrone-ethinyl estradiol (Junel 1/20) 1-20 MG-MCG per tablet  Self No No   Sig: Take 1 tablet by mouth daily   Patient not taking: Reported on 6/9/2023   omeprazole (PriLOSEC) 40 MG capsule  Self No No   Sig: Take 1 capsule (40 mg total) by mouth daily   ondansetron (ZOFRAN) 4 mg tablet  Self No No   Sig: Take 1 tablet (4 mg total) by mouth every 6 (six) hours   ondansetron (ZOFRAN) 4 mg tablet   No No   Sig: Take 1 tablet (4 mg total) by mouth every 6 (six) hours   oxyCODONE (ROXICODONE) 5 immediate release tablet  Self No No   Sig: Take 1 tablet (5 mg total) by mouth every 6 (six) hours as needed for moderate pain for up to 10 doses Max Daily Amount: 20 mg   Patient not taking: Reported on 6/9/2023   oxyCODONE (ROXICODONE) 5 immediate release tablet  Self No No   Sig: Take 1 tablet (5 mg total) by mouth every 6 (six) hours as needed for moderate pain for up to 10 doses Max Daily Amount: 20 mg   Patient not taking: Reported on 6/9/2023   oxybutynin (DITROPAN) 5 mg tablet  Self No No   Sig: Take 1 tablet (5 mg total) by mouth 3 (three) times a day as needed (bladder spasm)   Patient not taking: Reported on 6/9/2023   oxybutynin (DITROPAN) 5 mg tablet  Self No No   Sig: Take 1 tablet (5 mg total) by mouth 3 (three) times a day as needed (bladder spasm)   Patient not taking: Reported on 6/9/2023   phenazopyridine (PYRIDIUM) 200 mg tablet  Self No No   Sig: Take 1 tablet (200 mg total) by mouth 3 (three) times a day as needed for bladder spasms   phenazopyridine (PYRIDIUM) 200 mg tablet  Self No No   Sig: Take 1 tablet (200 mg total) by mouth 3 (three) times a day as needed for bladder spasms   Patient not taking: Reported on 9/26/2023   potassium citrate (UROCIT-K) 10 mEq  Self No No   Sig: TAKE 1 TABLET BY MOUTH 3 TIMES A DAY WITH MEALS   promethazine (PHENERGAN) 12.5 MG tablet  Self No No   Sig: Take 1 tablet (12.5 mg total) by mouth every 6 (six) hours as needed for nausea or vomiting   sucralfate (CARAFATE) 1 g tablet  Self No No   Sig: Take 1 tablet (1 g total) by mouth 4 (four) times a day for 12 doses   Patient not taking: Reported on 9/26/2023   tamsulosin (FLOMAX) 0.4 mg   No No   Sig: Take 1 capsule (0.4 mg total) by mouth daily with dinner for 5 days      Facility-Administered Medications: None       Past Medical History:   Diagnosis Date    Exercise-induced asthma     Kidney stone     Ovarian cyst        Past Surgical History:   Procedure Laterality Date    APPENDECTOMY      MN CYSTO/URETERO W/LITHOTRIPSY &INDWELL STENT INSRT Right 5/29/2023    Procedure: CYSTOSCOPY URETEROSCOPY WITH LITHOTRIPSY HOLMIUM LASER,  INSERTION STENT URETERAL;  Surgeon: Dheeraj Melgar MD;  Location: [facility];  Service: Urology    UPPER GASTROINTESTINAL ENDOSCOPY      WISDOM TOOTH EXTRACTION      WISDOM TOOTH EXTRACTION         Family History   Problem Relation Age of Onset    No Known Problems Father     No Known Problems Mother     Heart disease Maternal Grandfather      I have reviewed and agree with the history as documented.     E-Cigarette/Vaping    E-Cigarette Use Never User      E-Cigarette/Vaping Substances    Nicotine No     THC No     CBD No     Flavoring No     Other No     Unknown No      Social History     Tobacco Use    Smoking status: Never    Smokeless tobacco: Never   Vaping Use    Vaping status: Never Used   Substance Use Topics    Alcohol use: Yes     Comment: social     Drug use: Never        Review of Systems    Physical Exam  ED Triage Vitals [12/09/23 1446]   Temperature Pulse Respirations Blood Pressure SpO2   98.6 °F (37 °C) 89 18 144/90 100 %      Temp Source Heart Rate Source Patient Position - Orthostatic VS BP Location FiO2 (%)   Temporal Monitor Sitting Right arm --      Pain Score       7             Orthostatic Vital Signs  Vitals:    12/09/23 1600 12/09/23 1700 12/09/23 1730 12/09/23 1830   BP: 113/60 106/58 96/53 108/58   Pulse: 76 62 68 70   Patient Position - Orthostatic VS:   Lying Lying       Physical Exam    ED Medications  Medications   ondansetron (ZOFRAN) injection 4 mg (4 mg Intravenous Given 12/9/23 1515)   sodium chloride 0.9 % bolus 1,000 mL (0 mL Intravenous Stopped 12/9/23 1842)   ketorolac (TORADOL) injection 15 mg (15 mg Intravenous Given 12/9/23 1515)   acetaminophen (TYLENOL) tablet 650 mg (650 mg Oral Given 12/9/23 1534)   HYDROmorphone (DILAUDID) injection 0.5 mg (0.5 mg Intravenous Given 12/9/23 1552)   HYDROmorphone HCl (DILAUDID) injection 0.2 mg (0.2 mg Intravenous Given 12/9/23 1740)   ciprofloxacin (CIPRO) tablet 500 mg (500 mg Oral Given 12/9/23 1815)   fluconazole (DIFLUCAN) tablet 200 mg (200 mg Oral Given 12/9/23 1815)       Diagnostic Studies  Results Reviewed       Procedure Component Value Units Date/Time    Urine culture [[id number]]  (Abnormal) Collected: 12/09/23 1500    Lab Status: Final result Specimen: Urine, Clean Catch Updated: 12/11/23 0814     Urine Culture 80,000-89,000 cfu/ml Beta Hemolytic Streptococcus Group A      10,000-19,000 cfu/ml Beta Hemolytic Streptococcus Group B      10,000-19,000 cfu/ml    Urine Microscopic [[id number]]  (Abnormal) Collected: 12/09/23 1500    Lab Status: Final result Specimen: Urine, Clean Catch Updated: 12/09/23 1547     RBC, UA Innumerable /hpf      WBC, UA Innumerable /hpf      Epithelial Cells Occasional /hpf      Bacteria, UA Occasional /hpf      MUCUS THREADS Occasional    Basic metabolic panel [[id number]]  (Abnormal) Collected: 12/09/23 1515    Lab Status: Final result Specimen: Blood from Arm, Right Updated: 12/09/23 1545     Sodium 138 mmol/L      Potassium 4.4 mmol/L      Chloride 107 mmol/L      CO2 24 mmol/L      ANION GAP 7 mmol/L      BUN 11 mg/dL      Creatinine 0.59 mg/dL      Glucose 79 mg/dL      Calcium 9.4 mg/dL      eGFR 131 ml/min/1.73sq m     Narrative:      National Kid[employer] Disease Foundation guidelines for Chronic Kidney Disease (CKD):     Stage 1 with normal or high GFR (GFR > 90 mL/min/1.73 square meters)    Stage 2 Mild CKD (GFR = 60-89 mL/min/1.73 square meters)    Stage 3A Moderate CKD (GFR = 45-59 mL/min/1.73 square meters)    Stage 3B Moderate CKD (GFR = 30-44 mL/min/1.73 square meters)    Stage 4 Severe CKD (GFR = 15-29 mL/min/1.73 square meters)    Stage 5 End Stage CKD (GFR <15 mL/min/1.73 square meters)  Note: GFR calculation is accurate only with a steady state creatinine    CBC and differential [[id number]]  (Abnormal) Collected: 12/09/23 1515    Lab Status: Final result Specimen: Blood from Arm, Right Updated: 12/09/23 1534     WBC 10.59 Thousand/uL      RBC 4.58 Million/uL      Hemoglobin 13.5 g/dL      Hematocrit 40.7 %      MCV 89 fL      MCH 29.5 pg      MCHC 33.2 g/dL      RDW 12.8 %      MPV 10.0 fL      Platelets 119 Thousands/uL      nRBC 0 /100 WBCs      Neutrophils Relative 59 %      Immat GRANS % 1 %      Lymphocytes Relative 24 %      Monocytes Relative 9 %      Eosinophils Relative 6 %      Basophils Relative 1 %      Neutrophils Absolute 6.40 Thousands/µL      Immature Grans Absolute 0.06 Thousand/uL      Lymphocytes Absolute 2.51 Thousands/µL      Monocytes Absolute 0.92 Thousand/µL      Eosinophils Absolute 0.59 Thousand/µL      Basophils Absolute 0.11 Thousands/µL     UA w Reflex to Microscopic w Reflex to Culture [[id number]]  (Abnormal) Collected: 12/09/23 1500    Lab Status: Final result Specimen: Urine, Clean Catch Updated: 12/09/23 1517     Color, UA Light Brown     Clarity, UA Turbid     Specific Gravity, UA 1.017     pH, UA 6.0     Leukocytes, UA Large     Nitrite, UA Negative     Protein, UA 50 (1+) mg/dl      Glucose, UA Negative mg/dl      Ketones, UA Negative mg/dl      Urobilinogen, UA <2.0 mg/dl      Bilirubin, UA Negative     Occult Blood, UA Large    POCT pregnancy, urine [[id number]] (Normal) Resulted: 12/09/23 1502    Lab Status: Final result Updated: 12/09/23 1502     EXT Preg Test, Ur Negative     Control Valid                   CT renal stone study abdomen pelvis wo contrast   Final Result by Nichol Membreno DO (12/09 1738)      Small nonobstructing bilateral renal calculi. No ureteral calculi. Thin linear 8 mm radiopaque density within the transverse colon, of uncertain etiology. No focal inflammatory changes or evidence for bowel obstruction. Limited study without contrast.      Workstation performed: [id number]               Procedures  Procedures      ED Course  ED Course as of 12/13/23 1726   Sat Dec 09, 2023   1738 Pending CT                                       Medical Decision Making  Amount and/or Complexity of Data Reviewed  Labs: ordered. Radiology: ordered. Risk  OTC drugs. Prescription drug management. Disposition  Final diagnoses:   Pyelonephritis   Nausea     Time reflects when diagnosis was documented in both MDM as applicable and the Disposition within this note       Time User Action Codes Description Comment    12/9/2023  6:28 PM Canary Chad Add [N12] Pyelonephritis     12/9/2023  6:28 PM Canary Chad Add [R11.0] Nausea           ED Disposition       ED Disposition   Discharge    Condition   Stable    Date/Time   Sat Dec 9, 2023  6:29 PM    Comment   Rene Manrique discharge to home/self care.                    Follow-up Information       Follow up With Specialties Details Why Contact Info Additional [address] [facility], [facility] Medical Surgical, Nurse Practitioner In 1 week  1 [facility] [address] [address] [address] Emergency Department Emergency Medicine  If symptoms worsen [address] [id number]  [facility] Emergency Department, [address], [facility] Discharge Medication List as of 12/9/2023  6:32 PM        START taking these medications    Details   ciprofloxacin (CIPRO) 500 mg tablet Take 1 tablet (500 mg total) by mouth 2 (two) times a day for 10 days, Starting Sat 12/9/2023, Until Tue 12/19/2023, Normal      !! ondansetron (ZOFRAN) 4 mg tablet Take 1 tablet (4 mg total) by mouth every 6 (six) hours, Starting Sat 12/9/2023, Normal       !! - Potential duplicate medications found. Please discuss with provider.         CONTINUE these medications which have NOT CHANGED    Details   albuterol (PROVENTIL HFA,VENTOLIN HFA) 90 mcg/act inhaler Inhale 2 puffs every 6 (six) hours as needed for wheezing, Starting Mon 9/11/2023, Normal      ascorbic acid (VITAMIN C) 500 MG tablet Take 500 mg by mouth, Historical Med      bisacodyl (DULCOLAX) 5 mg EC tablet Take 2 tablets (10 mg total) by mouth in the morning for 14 doses Colonoscopy prep, Starting Thu 11/17/2022, Until Thu 12/1/2022, Normal      busPIRone (BUSPAR) 7.5 mg tablet Take 10 mg by mouth 2 (two) times a day, Starting Mon 3/20/2023, Historical Med      fluticasone (FLONASE) 50 mcg/act nasal spray 1 spray into each nostril daily, Starting Thu 9/1/2022, Normal      ketorolac (TORADOL) 10 mg tablet Take 1 tablet (10 mg total) by mouth every 6 (six) hours as needed for moderate pain for up to 5 days, Starting Mon 5/29/2023, Until Sat 6/3/2023 at 2359, Normal      ketorolac (TORADOL) 10 mg tablet Take 1 tablet (10 mg total) by mouth every 6 (six) hours as needed for moderate pain for up to 5 days, Starting Mon 5/29/2023, Until Sat 6/3/2023 at 2359, Normal      methenamine hippurate (HIPREX) 1 g tablet Take 1 tablet (1 g total) by mouth 2 (two) times a day with meals, Starting Fri 6/9/2023, Print      Multiple Vitamin (Multi-Day) TABS Take 1 tablet by mouth, Historical Med      naproxen (Naprosyn) 500 mg tablet Take 1 tablet (500 mg total) by mouth 2 (two) times a day with meals for 5 days, Starting Tue 5/23/2023, Until Sun 5/28/2023, Normal      naproxen (Naprosyn) 500 mg tablet Take 1 tablet (500 mg total) by mouth 2 (two) times a day with meals for 7 days, Starting Mon 11/6/2023, Until Mon 11/13/2023, Normal      norethindrone-ethinyl estradiol (Junel 1/20) 1-20 MG-MCG per tablet Take 1 tablet by mouth daily, Starting Wed 11/2/2022, Normal      omeprazole (PriLOSEC) 40 MG capsule Take 1 capsule (40 mg total) by mouth daily, Starting Wed 3/29/2023, Until Tue 9/26/2023, Normal      !! ondansetron (ZOFRAN) 4 mg tablet Take 1 tablet (4 mg total) by mouth every 6 (six) hours, Starting Tue 9/5/2023, Normal      !! ondansetron (ZOFRAN) 4 mg tablet Take 1 tablet (4 mg total) by mouth every 6 (six) hours, Starting Mon 11/6/2023, Normal      !! oxybutynin (DITROPAN) 5 mg tablet Take 1 tablet (5 mg total) by mouth 3 (three) times a day as needed (bladder spasm), Starting Mon 5/29/2023, Normal      !! oxybutynin (DITROPAN) 5 mg tablet Take 1 tablet (5 mg total) by mouth 3 (three) times a day as needed (bladder spasm), Starting Mon 5/29/2023, Normal      !! oxyCODONE (ROXICODONE) 5 immediate release tablet Take 1 tablet (5 mg total) by mouth every 6 (six) hours as needed for moderate pain for up to 10 doses Max Daily Amount: 20 mg, Starting Mon 5/29/2023, Normal      !! oxyCODONE (ROXICODONE) 5 immediate release tablet Take 1 tablet (5 mg total) by mouth every 6 (six) hours as needed for moderate pain for up to 10 doses Max Daily Amount: 20 mg, Starting Mon 5/29/2023, Normal      !! phenazopyridine (PYRIDIUM) 200 mg tablet Take 1 tablet (200 mg total) by mouth 3 (three) times a day as needed for bladder spasms, Starting Mon 5/29/2023, Normal      !! phenazopyridine (PYRIDIUM) 200 mg tablet Take 1 tablet (200 mg total) by mouth 3 (three) times a day as needed for bladder spasms, Starting Mon 5/29/2023, Normal      potassium citrate (UROCIT-K) 10 mEq TAKE 1 TABLET BY MOUTH 3 TIMES A DAY WITH MEALS, Starting Tue 9/26/2023, Normal      Probiotic Product (Misc Intestinal Vikki Regulat) CAPS Take by mouth, Historical Med      promethazine (PHENERGAN) 12.5 MG tablet Take 1 tablet (12.5 mg total) by mouth every 6 (six) hours as needed for nausea or vomiting, Starting Tue 10/25/2022, Normal      sucralfate (CARAFATE) 1 g tablet Take 1 tablet (1 g total) by mouth 4 (four) times a day for 12 doses, Starting Sun 10/23/2022, Until Wed 10/26/2022, Normal      Symbicort 160-4.5 MCG/ACT inhaler Inhale 2 puffs 2 (two) times a day Rinse mouth after use., Starting Tue 12/20/2022, Normal      tamsulosin (FLOMAX) 0.4 mg Take 1 capsule (0.4 mg total) by mouth daily with dinner for 5 days, Starting Mon 11/6/2023, Until Sat 11/11/2023, Normal      Zofran ODT 4 MG disintegrating tablet Take 1 tablet (4 mg total) by mouth every 6 (six) hours as needed for nausea or vomiting, Starting Sun 10/23/2022, Normal       !! - Potential duplicate medications found. Please discuss with provider. No discharge procedures on file. PDMP Review       None             ED Provider  Attending physically available and evaluated Nitish Saleem. I managed the patient along with the ED Attending.     Electronically Signed by           Candy Philip DO  12/13/23 [id number]

## 2023-12-11 LAB
BACTERIA UR CULT: ABNORMAL

## 2024-01-03 ENCOUNTER — APPOINTMENT (OUTPATIENT)
Dept: LAB | Facility: CLINIC | Age: 24
End: 2024-01-03
Payer: COMMERCIAL

## 2024-01-03 DIAGNOSIS — J02.9 ACUTE PHARYNGITIS, UNSPECIFIED ETIOLOGY: ICD-10-CM

## 2024-01-03 PROCEDURE — 87070 CULTURE OTHR SPECIMN AEROBIC: CPT

## 2024-01-03 PROCEDURE — 87147 CULTURE TYPE IMMUNOLOGIC: CPT

## 2024-01-04 LAB — BACTERIA THROAT CULT: ABNORMAL

## 2024-01-25 DIAGNOSIS — J45.40 MODERATE PERSISTENT ASTHMA WITHOUT COMPLICATION: ICD-10-CM

## 2024-01-25 RX ORDER — BUDESONIDE AND FORMOTEROL FUMARATE DIHYDRATE 160; 4.5 UG/1; UG/1
2 AEROSOL RESPIRATORY (INHALATION) 2 TIMES DAILY
Qty: 30.6 G | Refills: 5 | Status: SHIPPED | OUTPATIENT
Start: 2024-01-25 | End: 2024-01-29

## 2024-02-12 PROCEDURE — 87070 CULTURE OTHR SPECIMN AEROBIC: CPT | Performed by: NURSE PRACTITIONER

## 2024-02-13 ENCOUNTER — LAB REQUISITION (OUTPATIENT)
Dept: LAB | Facility: HOSPITAL | Age: 24
End: 2024-02-13
Payer: COMMERCIAL

## 2024-02-13 DIAGNOSIS — J02.9 ACUTE PHARYNGITIS, UNSPECIFIED: ICD-10-CM

## 2024-02-15 LAB — BACTERIA THROAT CULT: NORMAL

## 2024-02-21 PROBLEM — N39.0 RECURRENT URINARY TRACT INFECTION: Status: RESOLVED | Noted: 2023-06-09 | Resolved: 2024-02-21

## 2024-04-06 DIAGNOSIS — J45.40 MODERATE PERSISTENT ASTHMA WITHOUT COMPLICATION: ICD-10-CM

## 2024-04-08 RX ORDER — BUDESONIDE AND FORMOTEROL FUMARATE DIHYDRATE 160; 4.5 UG/1; UG/1
2 AEROSOL RESPIRATORY (INHALATION) 2 TIMES DAILY
Qty: 10.2 G | Refills: 5 | Status: SHIPPED | OUTPATIENT
Start: 2024-04-08

## 2024-04-17 DIAGNOSIS — E87.6 HYPOKALEMIA: ICD-10-CM

## 2024-04-17 DIAGNOSIS — N20.0 RECURRENT NEPHROLITHIASIS: ICD-10-CM

## 2024-04-17 RX ORDER — POTASSIUM CITRATE 10 MEQ/1
10 TABLET, EXTENDED RELEASE ORAL
Qty: 90 TABLET | Refills: 5 | Status: SHIPPED | OUTPATIENT
Start: 2024-04-17

## 2024-04-17 NOTE — TELEPHONE ENCOUNTER
----- Message from Denisse Navarro sent at 4/17/2024  2:31 PM EDT -----  Regarding: Prescription question  Contact: [phone]  Hello,    Can I get my prescription for potassium citrate moved to the [facility] in [facility]?    Thank you!  Denisse

## 2024-04-22 ENCOUNTER — TELEPHONE (OUTPATIENT)
Dept: PULMONOLOGY | Facility: CLINIC | Age: 24
End: 2024-04-22

## 2024-05-15 DIAGNOSIS — J45.40 MODERATE PERSISTENT ASTHMA WITHOUT COMPLICATION: ICD-10-CM

## 2024-05-15 RX ORDER — BUDESONIDE AND FORMOTEROL FUMARATE DIHYDRATE 160; 4.5 UG/1; UG/1
2 AEROSOL RESPIRATORY (INHALATION) 2 TIMES DAILY
Qty: 10.2 G | Refills: 2 | Status: SHIPPED | OUTPATIENT
Start: 2024-05-15

## 2024-05-29 ENCOUNTER — NURSE TRIAGE (OUTPATIENT)
Age: 24
End: 2024-05-29

## 2024-05-29 NOTE — PROGRESS NOTES
Diagnoses and all orders for this visit:    Breast nodule  -     US breast right limited (diagnostic); Future        See after visit summary for further information and recommendations to the above mentioned subjects which we may or may not have covered in detail during your visit     Subjective    CC: Problem visit     Denisse Navarro is a 23 y.o. female  presents with right sided breast pain for the past few days, 4/10, pain increases with touch  Thinks she may feel a small lump, she is mid cycle at this time   No birth control   Also mentions occasional right lower pelvic pain, hx of ovarian cysts , advised to monitor and return to office if pain re occurs or has any further concerns, recent imaging x 2 Neg     Patient's last menstrual period was 2024 (exact date).    Past Medical History:   Diagnosis Date    Exercise-induced asthma     Kidney stone     Ovarian cyst      Past Surgical History:   Procedure Laterality Date    APPENDECTOMY      NV CYSTO/URETERO W/LITHOTRIPSY &INDWELL STENT INSRT Right 2023    Procedure: CYSTOSCOPY URETEROSCOPY WITH LITHOTRIPSY HOLMIUM LASER,  INSERTION STENT URETERAL;  Surgeon: Deshawn Mohr MD;  Location: [facility];  Service: Urology    UPPER GASTROINTESTINAL ENDOSCOPY      WISDOM TOOTH EXTRACTION      WISDOM TOOTH EXTRACTION         Immunization History   Administered Date(s) Administered    INFLUENZA 10/23/2020       Family History   Problem Relation Age of Onset    No Known Problems Father     No Known Problems Mother     Heart disease Maternal Grandfather      Social History     Tobacco Use    Smoking status: Never    Smokeless tobacco: Never   Vaping Use    Vaping status: Never Used   Substance Use Topics    Alcohol use: Yes     Comment: social     Drug use: Never       Current Outpatient Medications:     albuterol (PROVENTIL HFA,VENTOLIN HFA) 90 mcg/act inhaler, Inhale 2 puffs every 6 (six) hours as needed for wheezing, Disp: 18 g, Rfl: 5    ascorbic acid  (VITAMIN C) 500 MG tablet, Take 500 mg by mouth, Disp: , Rfl:     budesonide-formoterol (SYMBICORT) 160-4.5 mcg/act inhaler, Inhale 2 puffs 2 (two) times a day Rinse mouth after use., Disp: 10.2 g, Rfl: 2    busPIRone (BUSPAR) 7.5 mg tablet, Take 10 mg by mouth 2 (two) times a day, Disp: , Rfl:     fluticasone (FLONASE) 50 mcg/act nasal spray, 1 spray into each nostril daily, Disp: 15.8 mL, Rfl: 5    Multiple Vitamin (Multi-Day) TABS, Take 1 tablet by mouth, Disp: , Rfl:     ondansetron (ZOFRAN) 4 mg tablet, Take 1 tablet (4 mg total) by mouth every 6 (six) hours, Disp: 20 tablet, Rfl: 0    phenazopyridine (PYRIDIUM) 200 mg tablet, Take 1 tablet (200 mg total) by mouth 3 (three) times a day as needed for bladder spasms, Disp: 30 tablet, Rfl: 0    potassium citrate (UROCIT-K) 10 mEq, Take 1 tablet (10 mEq total) by mouth 3 (three) times a day with meals, Disp: 90 tablet, Rfl: 5    Probiotic Product (Misc Intestinal Vikki Regulat) CAPS, Take by mouth, Disp: , Rfl:     promethazine (PHENERGAN) 12.5 MG tablet, Take 1 tablet (12.5 mg total) by mouth every 6 (six) hours as needed for nausea or vomiting, Disp: 30 tablet, Rfl: 0    naproxen (Naprosyn) 500 mg tablet, Take 1 tablet (500 mg total) by mouth 2 (two) times a day with meals for 7 days, Disp: 14 tablet, Rfl: 0    omeprazole (PriLOSEC) 40 MG capsule, Take 1 capsule (40 mg total) by mouth daily, Disp: 30 capsule, Rfl: 5    ondansetron (ZOFRAN) 4 mg tablet, Take 1 tablet (4 mg total) by mouth every 6 (six) hours, Disp: 12 tablet, Rfl: 0    ondansetron (ZOFRAN) 4 mg tablet, Take 1 tablet (4 mg total) by mouth every 6 (six) hours, Disp: 12 tablet, Rfl: 0    tamsulosin (FLOMAX) 0.4 mg, Take 1 capsule (0.4 mg total) by mouth daily with dinner for 5 days, Disp: 5 capsule, Rfl: 0  Patient Active Problem List    Diagnosis Date Noted    Recurrent nephrolithiasis 06/09/2023    Ureterolithiasis 06/09/2023    Anxiety 05/29/2023    Hypokalemia 05/29/2023    Renal cyst 03/27/2023  "   Right flank pain 2023    Bacteriuria 2023    SIRS (systemic inflammatory response syndrome) (HCC) 2023    Obesity (BMI 30.0-34.9) 10/26/2022    Moderate persistent asthma 2021    Allergies 2021    Gastroesophageal reflux disease 2021       No Known Allergies    OB History    Para Term  AB Living   0 0 0 0 0 0   SAB IAB Ectopic Multiple Live Births   0 0 0 0 0       Vitals:    24 1300   BP: 118/62   BP Location: Left arm   Patient Position: Sitting   Cuff Size: Large   Weight: 114 kg (250 lb 6.4 oz)   Height: 5' 10\" (1.778 m)     Body mass index is 35.93 kg/m².    Review of Systems     Constitutional: Negative for chills, fatigue, fever, headaches, visual disturbances, and unexpected weight change.   Respiratory: Negative for cough, & shortness of breath.  Cardiovascular: Negative for chest pain. .    Gastrointestinal: Negative for Abd pain, nausea & vomiting, constipation and diarrhea.   Genitourinary: Negative for difficulty urinating, dysuria, hematuria, dyspareunia, unusual vaginal bleeding or discharge  Skin: Negative skin changes    Physical Exam     Constitutional: Alert & Oriented x3, well-developed and well-nourished. No distress.   HENT: Atraumatic, Normocephalic,   Neck: Normal range of motion.   Pulmonary: Effort normal.   Musculoskeletal: Normal ROM  Skin: Warm & Dry  Psychological: Normal mood, thought content, behavior & judgement     Breasts:   Right: tissue soft without skin changes or nipple discharge. Small nodule noted at 2 o clock, slightly tender to touch. No areas of erythema. No subclavicular, axillary, pectoral adenopathy  Left:  tissue soft without masses, tenderness, skin changes or nipple discharge. No areas of erythema or pain. No subclavicular, axillary, pectoral adenopathy    Pelvic exam was performed with patient supine, lithotomy position.      Labia: Negative rash, tenderness, lesion or injury on the right labia.              " Negative rash, tenderness, lesion or injury on the left labia.   Urethral meatus:  Negative for  tenderness, inflammation or discharge.   Uterus: not deviated, enlarged, fixed or tender.   Cervix: No CMT, no discharge or friability.   Right adnexa: no mass, no tenderness and no fullness.  Left adnexa: no mass, no tenderness and no fullness.   Vagina: No erythema, tenderness, masses, or foreign body in the vagina. No signs of injury around the vagina. No unusual vaginal discharge   Perineum without lesions, signs of injury, erythema or swelling.  Inguinal Canal:        Right: No inguinal adenopathy or hernia present.        Left: No inguinal adenopathy or hernia present.     Physical Exam  Genitourinary:      Genitourinary Comments: Small breast nodule    Chest:

## 2024-05-29 NOTE — TELEPHONE ENCOUNTER
Regarding: lump on inner right breast with pain  ----- Message from Loree GALLEGOS sent at 5/29/2024  8:27 AM EDT -----  Pt called in stated, lump on inner right breast with pain

## 2024-05-29 NOTE — TELEPHONE ENCOUNTER
"Patient calling in reporting right inner breast lump that she felt approximately 3 days ago.  She states the lump is tender to touch.  Scheduled a breast check appointment to be further evaluated.  She voiced appreciation.     Reason for Disposition   Breast lump    Answer Assessment - Initial Assessment Questions  1. SYMPTOM: \"What's the main symptom you're concerned about?\"  (e.g., lump, pain, rash, nipple discharge)      Right inner breast lump   2. LOCATION: \"Where is the pain  located?\"      Right inner breast lump   3. ONSET: \"When did lump  start?\"      3 days ago   4. PRIOR HISTORY: \"Do you have any history of prior problems with your breasts?\" (e.g., lumps, cancer, fibrocystic breast disease)      Denies   5. CAUSE: \"What do you think is causing this symptom?\"      unknown  6. OTHER SYMPTOMS: \"Do you have any other symptoms?\" (e.g., fever, breast pain, redness or rash, nipple discharge)      Pain when touching   7. PREGNANCY-BREASTFEEDING: \"Is there any chance you are pregnant?\" \"When was your last menstrual period?\" \"Are you breastfeeding?\"      Denies    Protocols used: Breast Symptoms-ADULT-[place]    "

## 2024-05-30 ENCOUNTER — OFFICE VISIT (OUTPATIENT)
Dept: OBGYN CLINIC | Facility: CLINIC | Age: 24
End: 2024-05-30
Payer: COMMERCIAL

## 2024-05-30 VITALS
BODY MASS INDEX: 35.85 KG/M2 | DIASTOLIC BLOOD PRESSURE: 62 MMHG | WEIGHT: 250.4 LBS | SYSTOLIC BLOOD PRESSURE: 118 MMHG | HEIGHT: 70 IN

## 2024-05-30 DIAGNOSIS — N63.0 BREAST NODULE: Primary | ICD-10-CM

## 2024-05-30 PROCEDURE — 99213 OFFICE O/P EST LOW 20 MIN: CPT | Performed by: OBSTETRICS & GYNECOLOGY

## 2024-05-30 NOTE — PATIENT INSTRUCTIONS
Ovarian Cyst   AMBULATORY CARE:   An ovarian cyst  is a fluid-filled sac that grows in or on an ovary. You have 2 ovaries, 1 on each side of your uterus. They are small, about the shape of an almond. Ovarian cysts are common in women who have regular monthly cycles. During your monthly cycle, eggs are released from the ovaries. The cyst usually contains fluid but may sometimes have blood or tissue in it. Most ovarian cysts are harmless and go away without treatment in a few months. Some cysts can grow large, cause pain, or break open.       Common signs and symptoms  include pressure, bloating or swelling in your lower abdomen on the side of the cyst. You may also have dull or sharp pain that may come and go. The following are less common signs and symptoms:  A dull ache in your lower back and thighs    Unusual vaginal bleeding    Weight gain you did not expect or plan    Pain during your monthly cycle    Tenderness in your breasts    Trouble completely emptying your bowels or bladder    The need to urinate often    Pain during sex    Call your local emergency number (911 in the [place]) if:   You have severe pain with fever and vomiting.    You have sudden, severe abdominal pain.    You are too weak, faint, or dizzy to stand up.    You are breathing very quickly.    Call your doctor or gynecologist if:   Your periods are early, late, or more painful than usual.    You have questions or concerns about your condition or care.    Treatment  will depend on your age, symptoms, and the kind of cyst you have. You may need any of the following:  Watchful waiting  may be recommended. This means the cyst is not treated right away. You will need to watch for any signs or symptoms that the cyst is growing. You may need to return for one or more ultrasounds after a certain period of time. These will show if your cyst has changed in size.    Medicines:  You may need any of the following:     Birth control pills  may help control your  monthly cycle, prevent cysts, or cause them to shrink.    Acetaminophen  decreases pain and fever. It is available without a doctor's order. Ask how much to take and how often to take it. Follow directions. Read the labels of all other medicines you are using to see if they also contain acetaminophen, or ask your doctor or pharmacist. Acetaminophen can cause liver damage if not taken correctly.    NSAIDs , such as ibuprofen, help decrease swelling, pain, and fever. This medicine is available with or without a doctor's order. NSAIDs can cause stomach bleeding or kidney problems in certain people. If you take blood thinner medicine, always ask your healthcare provider if NSAIDs are safe for you. Always read the medicine label and follow directions.    Prescription pain medicine  may be given. Ask your healthcare provider how to take this medicine safely. Some prescription pain medicines contain acetaminophen. Do not take other medicines that contain acetaminophen without talking to your healthcare provider. Too much acetaminophen may cause liver damage. Prescription pain medicine may cause constipation. Ask your healthcare provider how to prevent or treat constipation.     Take your medicine as directed.  Contact your healthcare provider if you think your medicine is not helping or if you have side effects. Tell your provider if you are allergic to any medicine. Keep a list of the medicines, vitamins, and herbs you take. Include the amounts, and when and why you take them. Bring the list or the pill bottles to follow-up visits. Carry your medicine list with you in case of an emergency.    Surgery  may be needed to remove the ovarian cyst.    Manage ovarian cysts:  You can manage a current cyst and help healthcare providers find future cysts early.  Apply heat to decrease pain and cramping from a cyst.  Sit in a warm bath, or place a heating pad (turned on low) on your abdomen. Do this for 15 to 20 minutes every hour  for comfort.    Get regular pelvic exams or Pap smears.  This will help providers find any new ovarian cysts. Tell your healthcare provider about any unusual changes in your monthly cycle.    Follow up with your doctor or gynecologist as directed:  Write down your questions so you remember to ask them during your visits.  © Copyright [employer] 2023 Information is for End User's use only and may not be sold, redistributed or otherwise used for commercial purposes.  The above information is an  only. It is not intended as medical advice for individual conditions or treatments. Talk to your doctor, nurse or pharmacist before following any medical regimen to see if it is safe and effective for you.  Breast Mass   AMBULATORY CARE:   A breast mass  is a lump or growth in your breast or underarm. A cyst (fluid-filled pocket), an injury, or changes in your breast tissue are the most common causes. A breast mass can also be caused by cancer. You must follow up as directed to find the cause of your breast mass.  Call your doctor if:   Your breast mass returns or grows larger.    You have pain in your breast or underarm.    You have nipple discharge.    You notice other changes to your breasts or nipples, such as dimpling or a rash.    You had an aspiration and you have redness, pain, swelling, or warmth near the aspiration site.    You have a fever.    You have questions or concerns about your condition or care.    Continue to do breast self-exams as directed:  Check your breast for changes in size, shape, or feel of the breast tissue. Check under your arms and all around your breasts. If you have monthly periods, examine your breasts after your period is over. Contact your healthcare provider if you notice any changes in your breasts. If you have questions, ask for more information on how to do a breast self-exam.       Follow up with your doctor as directed:  You may need to return for more tests. Write down  your questions so you remember to ask them during your visits.  © Copyright [employer] 2023 Information is for End User's use only and may not be sold, redistributed or otherwise used for commercial purposes.  The above information is an  only. It is not intended as medical advice for individual conditions or treatments. Talk to your doctor, nurse or pharmacist before following any medical regimen to see if it is safe and effective for you.

## 2024-06-14 ENCOUNTER — TELEPHONE (OUTPATIENT)
Dept: ADMINISTRATIVE | Facility: OTHER | Age: 24
End: 2024-06-14

## 2024-06-14 NOTE — TELEPHONE ENCOUNTER
----- Message from Patricia SESAY sent at 6/13/2024 11:39 AM EDT -----  Regarding: HIV  06/13/24 11:39 AM    Hello, our patient Densise Navarro has had HIV completed/performed. Please assist in updating the patient chart by pulling the Care Everywhere (CE) document. The date of service is 07/06/2022.     Thank you,  Patricia Gonzalez  [facility]

## 2024-06-17 PROBLEM — F32.A ANXIETY AND DEPRESSION: Chronic | Status: ACTIVE | Noted: 2023-05-29

## 2024-06-17 PROBLEM — E66.09 CLASS 2 OBESITY DUE TO EXCESS CALORIES WITHOUT SERIOUS COMORBIDITY WITH BODY MASS INDEX (BMI) OF 35.0 TO 35.9 IN ADULT: Chronic | Status: ACTIVE | Noted: 2022-10-26

## 2024-06-17 PROBLEM — R65.10 SIRS (SYSTEMIC INFLAMMATORY RESPONSE SYNDROME) (HCC): Status: RESOLVED | Noted: 2023-03-27 | Resolved: 2024-06-17

## 2024-06-17 PROBLEM — F32.A ANXIETY AND DEPRESSION: Status: ACTIVE | Noted: 2023-05-29

## 2024-06-17 PROBLEM — N28.1 RENAL CYST: Status: RESOLVED | Noted: 2023-03-27 | Resolved: 2024-06-17

## 2024-06-17 PROBLEM — Z00.00 ANNUAL PHYSICAL EXAM: Status: ACTIVE | Noted: 2024-06-17

## 2024-06-17 PROBLEM — J45.40 MODERATE PERSISTENT ASTHMA: Chronic | Status: ACTIVE | Noted: 2021-09-22

## 2024-06-17 PROBLEM — N20.1 URETEROLITHIASIS: Status: RESOLVED | Noted: 2023-06-09 | Resolved: 2024-06-17

## 2024-06-17 PROBLEM — F41.1 GAD (GENERALIZED ANXIETY DISORDER): Chronic | Status: ACTIVE | Noted: 2023-05-29

## 2024-06-17 PROBLEM — F41.9 ANXIETY AND DEPRESSION: Chronic | Status: ACTIVE | Noted: 2023-05-29

## 2024-06-17 PROBLEM — F41.1 GAD (GENERALIZED ANXIETY DISORDER): Status: ACTIVE | Noted: 2023-05-29

## 2024-06-17 PROBLEM — Z11.59 NEED FOR HEPATITIS C SCREENING TEST: Status: ACTIVE | Noted: 2024-06-17

## 2024-06-17 PROBLEM — E66.812 CLASS 2 OBESITY DUE TO EXCESS CALORIES WITHOUT SERIOUS COMORBIDITY WITH BODY MASS INDEX (BMI) OF 35.0 TO 35.9 IN ADULT: Status: ACTIVE | Noted: 2022-10-26

## 2024-06-17 PROBLEM — K21.9 GASTROESOPHAGEAL REFLUX DISEASE WITHOUT ESOPHAGITIS: Chronic | Status: ACTIVE | Noted: 2021-09-22

## 2024-06-17 PROBLEM — N20.0 RECURRENT NEPHROLITHIASIS: Status: RESOLVED | Noted: 2023-06-09 | Resolved: 2024-06-17

## 2024-06-17 PROBLEM — Z13.29 SCREENING FOR THYROID DISORDER: Status: ACTIVE | Noted: 2024-06-17

## 2024-06-17 PROBLEM — E66.812 CLASS 2 OBESITY DUE TO EXCESS CALORIES WITHOUT SERIOUS COMORBIDITY WITH BODY MASS INDEX (BMI) OF 35.0 TO 35.9 IN ADULT: Chronic | Status: ACTIVE | Noted: 2022-10-26

## 2024-06-17 PROBLEM — E87.6 HYPOKALEMIA: Status: RESOLVED | Noted: 2023-05-29 | Resolved: 2024-06-17

## 2024-06-17 PROBLEM — Z12.4 SCREENING FOR CERVICAL CANCER: Status: ACTIVE | Noted: 2024-06-17

## 2024-06-17 PROBLEM — Z13.220 SCREENING FOR HYPERLIPIDEMIA: Status: ACTIVE | Noted: 2024-06-17

## 2024-06-17 PROBLEM — E66.09 CLASS 2 OBESITY DUE TO EXCESS CALORIES WITHOUT SERIOUS COMORBIDITY WITH BODY MASS INDEX (BMI) OF 35.0 TO 35.9 IN ADULT: Status: ACTIVE | Noted: 2022-10-26

## 2024-06-17 PROBLEM — R82.71 BACTERIURIA: Status: RESOLVED | Noted: 2023-03-27 | Resolved: 2024-06-17

## 2024-06-17 PROBLEM — Z13.1 SCREENING FOR DIABETES MELLITUS: Status: ACTIVE | Noted: 2024-06-17

## 2024-06-17 PROBLEM — R10.9 RIGHT FLANK PAIN: Status: RESOLVED | Noted: 2023-03-27 | Resolved: 2024-06-17

## 2024-06-17 PROBLEM — T78.40XA ALLERGIES: Status: RESOLVED | Noted: 2021-09-22 | Resolved: 2024-06-17

## 2024-06-17 RX ORDER — ALPRAZOLAM 0.25 MG/1
0.25 TABLET ORAL 2 TIMES DAILY PRN
Qty: 60 TABLET | Refills: 0 | Status: CANCELLED | OUTPATIENT
Start: 2024-06-17

## 2024-06-17 RX ORDER — OMEPRAZOLE 20 MG/1
20 CAPSULE, DELAYED RELEASE ORAL
Qty: 90 CAPSULE | Refills: 3 | Status: CANCELLED | OUTPATIENT
Start: 2024-06-17 | End: 2025-06-12

## 2024-06-17 RX ORDER — BUSPIRONE HYDROCHLORIDE 10 MG/1
10 TABLET ORAL 2 TIMES DAILY
Qty: 180 TABLET | Refills: 3 | Status: CANCELLED | OUTPATIENT
Start: 2024-06-17

## 2024-06-17 RX ORDER — ECHINACEA PURPUREA EXTRACT 125 MG
1 TABLET ORAL AS NEEDED
COMMUNITY
Start: 2024-01-15

## 2024-06-17 RX ORDER — BUSPIRONE HYDROCHLORIDE 10 MG/1
10 TABLET ORAL 2 TIMES DAILY
COMMUNITY
Start: 2024-03-19 | End: 2024-06-18

## 2024-06-17 RX ORDER — BUDESONIDE AND FORMOTEROL FUMARATE DIHYDRATE 160; 4.5 UG/1; UG/1
2 AEROSOL RESPIRATORY (INHALATION) 2 TIMES DAILY
COMMUNITY
End: 2024-06-18

## 2024-06-17 RX ORDER — ALPRAZOLAM 0.25 MG/1
0.25 TABLET ORAL DAILY PRN
COMMUNITY
Start: 2024-04-02 | End: 2024-06-18

## 2024-06-17 NOTE — ASSESSMENT & PLAN NOTE
This is a chronic and stable disease process.   Continue  Proventil HFA  Symbicort  Flonase  Will start the patient on Singulair

## 2024-06-17 NOTE — PATIENT INSTRUCTIONS
Wellness Visit for Adults   AMBULATORY CARE:   A wellness visit  is when you see your healthcare provider to get screened for health problems. Your healthcare provider will also give you advice on how to stay healthy. Write down your questions so you remember to ask them. Ask your healthcare provider how often you should have a wellness visit.  What happens at a wellness visit:  Your healthcare provider will ask about your health, and your family history of health problems. This includes high blood pressure, heart disease, and cancer. He or she will ask if you have symptoms that concern you, if you smoke, and about your mood. You may also be asked about your intake of medicines, supplements, food, and alcohol. Any of the following may be done:  Your weight  will be checked. Your height may also be checked so your body mass index (BMI) can be calculated. Your BMI shows if you are at a healthy weight.    Your blood pressure  and heart rate will be checked. Your temperature may also be checked.    Blood and urine tests  may be done. Blood tests may be done to check your cholesterol levels. Abnormal cholesterol levels increase your risk for heart disease and stroke. You may also need a blood or urine test to check for diabetes if you are at increased risk. Urine tests may be done to look for signs of an infection or kidney disease.    A physical exam  includes checking your heartbeat and lungs with a stethoscope. Your healthcare provider may also check your skin to look for sun damage.    Screening tests  may be recommended. A screening test is done to check for diseases that may not cause symptoms. The screening tests you may need depend on your age, gender, family history, and lifestyle habits. For example, colorectal screening may be recommended if you are 50 years old or older.    Screening tests you need if you are a woman:   A Pap smear  is used to screen for cervical cancer. Pap smears are usually done every 3 to  5 years depending on your age. You may need them more often if you have had abnormal Pap smear test results in the past. Ask your healthcare provider how often you should have a Pap smear.    A mammogram  is an x-ray of your breasts to screen for breast cancer. Experts recommend mammograms every 2 years starting at age 50 years. You may need a mammogram at age 49 years or younger if you have an increased risk for breast cancer. Talk to your healthcare provider about when you should start having mammograms and how often you need them.    Vaccines you may need:   Get an influenza vaccine  every year. The influenza vaccine protects you from the flu. Several types of viruses cause the flu. The viruses change over time, so new vaccines are made each year.    Get a tetanus-diphtheria (Td) booster vaccine  every 10 years. This vaccine protects you against tetanus and diphtheria. Tetanus is a severe infection that may cause painful muscle spasms and lockjaw. Diphtheria is a severe bacterial infection that causes a thick covering in the back of your mouth and throat.    Get a human papillomavirus (HPV) vaccine  if you are female and aged 19 to 26 or male 19 to 21 and never received it. This vaccine protects you from HPV infection. HPV is the most common infection spread by sexual contact. HPV may also cause vaginal, penile, and anal cancers.    Get a pneumococcal vaccine  if you are aged 65 years or older. The pneumococcal vaccine is an injection given to protect you from pneumococcal disease. Pneumococcal disease is an infection caused by pneumococcal bacteria. The infection may cause pneumonia, meningitis, or an ear infection.    Get a shingles vaccine  if you are 60 or older, even if you have had shingles before. The shingles vaccine is an injection to protect you from the varicella-zoster virus. This is the same virus that causes chickenpox. Shingles is a painful rash that develops in people who had chickenpox or have  been exposed to the virus.    How to eat healthy:  My Plate is a model for planning healthy meals. It shows the types and amounts of foods that should go on your plate. Fruits and vegetables make up about half of your plate, and grains and protein make up the other half. A serving of dairy is included on the side of your plate. The amount of calories and serving sizes you need depends on your age, gender, weight, and height. Examples of healthy foods are listed below:  Eat a variety of vegetables  such as dark green, red, and orange vegetables. You can also include canned vegetables low in sodium (salt) and frozen vegetables without added butter or sauces.    Eat a variety of fresh fruits , canned fruit in 100% juice, frozen fruit, and dried fruit.    Include whole grains.  At least half of the grains you eat should be whole grains. Examples include whole-wheat bread, wheat pasta, brown rice, and whole-grain cereals such as oatmeal.    Eat a variety of protein foods such as seafood (fish and shellfish), lean meat, and poultry without skin (turkey and chicken). Examples of lean meats include pork leg, shoulder, or tenderloin, and beef round, sirloin, tenderloin, and extra lean ground beef. Other protein foods include eggs and egg substitutes, beans, peas, soy products, nuts, and seeds.    Choose low-fat dairy products such as skim or 1% milk or low-fat yogurt, cheese, and cottage cheese.    Limit unhealthy fats  such as butter, hard margarine, and shortening.       Exercise:  Exercise at least 30 minutes per day on most days of the week. Some examples of exercise include walking, biking, dancing, and swimming. You can also fit in more physical activity by taking the stairs instead of the elevator or parking farther away from stores. Include muscle strengthening activities 2 days each week. Regular exercise provides many health benefits. It helps you manage your weight, and decreases your risk for type 2 diabetes,  heart disease, stroke, and high blood pressure. Exercise can also help improve your mood. Ask your healthcare provider about the best exercise plan for you.       General health and safety guidelines:   Do not smoke.  Nicotine and other chemicals in cigarettes and cigars can cause lung damage. Ask your healthcare provider for information if you currently smoke and need help to quit. E-cigarettes or smokeless tobacco still contain nicotine. Talk to your healthcare provider before you use these products.    Limit alcohol.  A drink of alcohol is 12 ounces of beer, 5 ounces of wine, or 1½ ounces of liquor.    Lose weight, if needed.  Being overweight increases your risk of certain health conditions. These include heart disease, high blood pressure, type 2 diabetes, and certain types of cancer.    Protect your skin.  Do not sunbathe or use tanning beds. Use sunscreen with a SPF 15 or higher. Apply sunscreen at least 15 minutes before you go outside. Reapply sunscreen every 2 hours. Wear protective clothing, hats, and sunglasses when you are outside.    Drive safely.  Always wear your seatbelt. Make sure everyone in your car wears a seatbelt. A seatbelt can save your life if you are in an accident. Do not use your cell phone when you are driving. This could distract you and cause an accident. Pull over if you need to make a call or send a text message.    Practice safe sex.  Use latex condoms if are sexually active and have more than one partner. Your healthcare provider may recommend screening tests for sexually transmitted infections (STIs).    Wear helmets, lifejackets, and protective gear.  Always wear a helmet when you ride a bike or motorcycle, go skiing, or play sports that could cause a head injury. Wear protective equipment when you play sports. Wear a lifejacket when you are on a boat or doing water sports.    © Copyright [employer] 2023 Information is for End User's use only and may not be sold, redistributed or  otherwise used for commercial purposes.  The above information is an  only. It is not intended as medical advice for individual conditions or treatments. Talk to your doctor, nurse or pharmacist before following any medical regimen to see if it is safe and effective for you.    Cholesterol and Your Health   AMBULATORY CARE:   Cholesterol  is a waxy, fat-like substance. Your body uses cholesterol to make hormones and new cells, and to protect nerves. Cholesterol is made by your body. It also comes from certain foods you eat, such as meat and dairy products. Your healthcare provider can help you set goals for your cholesterol levels. Your provider can help you create a plan to meet your goals.  Cholesterol level goals:  Your cholesterol level goals depend on your risk for heart disease, your age, and your other health conditions. The following are general guidelines:  Total cholesterol  includes low-density lipoprotein (LDL), high-density lipoprotein (HDL), and triglyceride levels. The total cholesterol level should be lower than 200 mg/dL and is best at about 150 mg/dL.    LDL cholesterol  is called bad cholesterol  because it forms plaque in your arteries. As plaque builds up, your arteries become narrow, and less blood flows through. When plaque decreases blood flow to your heart, you may have chest pain. If plaque completely blocks an artery that brings blood to your heart, you may have a heart attack. Plaque can break off and form blood clots. Blood clots may block arteries in your brain and cause a stroke. The level should be less than 130 mg/dL and is best at about 100 mg/dL.         HDL cholesterol  is called good cholesterol  because it helps remove LDL cholesterol from your arteries. It does this by attaching to LDL cholesterol and carrying it to your liver. Your liver breaks down LDL cholesterol so your body can get rid of it. High levels of HDL cholesterol can help prevent a heart attack and  stroke. Low levels of HDL cholesterol can increase your risk for heart disease, heart attack, and stroke. The level should be at least 40 mg/dL in males or at least 50 mg/dL in females.    Triglycerides  are a type of fat that store energy from foods you eat. High levels of triglycerides also cause plaque buildup. This can increase your risk for a heart attack or stroke. If your triglyceride level is high, your LDL cholesterol level may also be high. The level should be less than 150 mg/dL.    Any of the following can increase your risk for high cholesterol:   Smoking or drinking large amounts of alcohol    Having overweight or obesity, or not getting enough exercise    A medical condition such as hypertension (high blood pressure) or diabetes    A family history of high cholesterol    Age older than 65    What you need to know about having your cholesterol levels checked:  Adults 20 to 45 years of age should have their cholesterol levels checked every 4 to 6 years. Adults 45 years or older should have their cholesterol checked every 1 to 2 years. You may need your cholesterol checked more often, or at a younger age, if you have risk factors for heart disease. You may also need to have your cholesterol checked more often if you have other health conditions, such as diabetes. Blood tests are used to check cholesterol levels. Blood tests measure your levels of triglycerides, LDL cholesterol, and HDL cholesterol.  How healthy fats affect your cholesterol levels:  Healthy fats, also called unsaturated fats, help lower LDL cholesterol and triglyceride levels. Healthy fats include the following:  Monounsaturated fats  are found in foods such as olive oil, canola oil, avocado, nuts, and olives.    Polyunsaturated fats,  such as omega 3 fats, are found in fish, such as salmon, trout, and tuna. They can also be found in plant foods such as flaxseed, walnuts, and soybeans.    How unhealthy fats affect your cholesterol levels:   Unhealthy fats increase LDL cholesterol and triglyceride levels. They are found in foods high in cholesterol, saturated fat, and trans fat:  Cholesterol  is found in eggs, dairy, and meat.    Saturated fat  is found in butter, cheese, ice cream, whole milk, and coconut oil. Saturated fat is also found in meat, such as sausage, hot dogs, and bologna.    Trans fat  is found in liquid oils and is used in fried and baked foods. Foods that contain trans fats include chips, crackers, muffins, sweet rolls, microwave popcorn, and cookies.    Treatment  for high cholesterol will also decrease your risk of heart disease, heart attack, and stroke. Treatment may include any of the following:  Lifestyle changes  may include food, exercise, weight loss, and quitting smoking. You may also need to decrease the amount of alcohol you drink. Your healthcare provider will want you to start with lifestyle changes. Other treatment may be added if lifestyle changes are not enough. Your healthcare provider may recommend you work with a team to manage hyperlipidemia. The team may include medical experts such as a dietitian, an exercise or physical therapist, and a behavior therapist. Your family members may be included in helping you create lifestyle changes.    Medicines  may be given to lower your LDL cholesterol, triglyceride levels, or total cholesterol level. You may need medicines to lower your cholesterol if any of the following is true:    You have a history of stroke, TIA, unstable angina, or a heart attack.    Your LDL cholesterol level is 190 mg/dL or higher.    You are age 40 to 75 years, have diabetes or heart disease risk factors, and your LDL cholesterol is 70 mg/dL or higher.    Supplements  include fish oil, red yeast rice, and garlic. Fish oil may help lower your triglyceride and LDL cholesterol levels. It may also increase your HDL cholesterol level. Red yeast rice may help decrease your total cholesterol level and LDL  cholesterol level. Garlic may help lower your total cholesterol level. Do not take any supplements without talking to your healthcare provider.    Food changes you can make to lower your cholesterol levels:  A dietitian can help you create a healthy eating plan. Your dietitian can show you how to read food labels and choose foods low in saturated fat, trans fats, and cholesterol.     Decrease the total amount of fat you eat.  Choose lean meats, fat-free or 1% fat milk, and low-fat dairy products, such as yogurt and cheese. Try to limit or avoid red meats. Limit or do not eat fried foods or baked goods, such as cookies.    Replace unhealthy fats with healthy fats.  Cook foods in olive oil or canola oil. Choose soft margarines that are low in saturated fat and trans fat. Seeds, nuts, and avocados are other examples of healthy fats.    Eat foods with omega-3 fats.  Examples include salmon, tuna, mackerel, walnuts, and flaxseed. Eat fish 2 times per week. Pregnant women should not eat fish that have high levels of mercury, such as shark, swordfish, and katy mackerel.         Increase the amount of high-fiber foods you eat.  High-fiber foods can help lower your LDL cholesterol. Aim to get between 20 and 30 grams of fiber each day. Fruits and vegetables are high in fiber. Eat at least 5 servings each day. Other high-fiber foods are whole-grain or whole-wheat breads, pastas, or cereals, and brown rice. Eat 3 ounces of whole-grain foods each day. Increase fiber slowly. You may have abdominal discomfort, bloating, and gas if you add fiber to your diet too quickly.         Eat healthy protein foods.  Examples include low-fat dairy products, skinless chicken and turkey, fish, and nuts.    Limit foods and drinks that are high in sugar.  Your dietitian or healthcare provider can help you create daily limits for high-sugar foods and drinks. The limit may be lower if you have diabetes or another health condition. Limits can also  help you lose weight if needed.  Lifestyle changes you can make to lower your cholesterol levels:   Maintain a healthy weight.  Ask your healthcare provider what a healthy weight is for you. Ask your provider to help you create a weight loss plan if needed. Weight loss can decrease your total cholesterol and triglyceride levels. Weight loss may also help keep your blood pressure at a healthy level.    Be physically active throughout the day.  Physical activity, such as exercise, can help lower your total cholesterol level and maintain a healthy weight. Physical activity can also help increase your HDL cholesterol level. Work with your healthcare provider to create an program that is right for you. Get at least 30 to 40 minutes of moderate physical activity most days of the week. Examples of exercise include brisk walking, swimming, or biking. Also include strength training at least 2 times each week. Your healthcare providers can help you create a physical activity plan.            Do not smoke.  Nicotine and other chemicals in cigarettes and cigars can raise your cholesterol levels. Ask your healthcare provider for information if you currently smoke and need help to quit. E-cigarettes or smokeless tobacco still contain nicotine. Talk to your healthcare provider before you use these products.         Limit or do not drink alcohol.  Alcohol can increase your triglyceride levels. Ask your healthcare provider before you drink alcohol. Ask how much is okay for you to drink in 24 hours or 1 week.    Follow up with your doctor as directed:  Write down your questions so you remember to ask them during your visits.  © Copyright [employer] 2023 Information is for End User's use only and may not be sold, redistributed or otherwise used for commercial purposes.  The above information is an  only. It is not intended as medical advice for individual conditions or treatments. Talk to your doctor, nurse or pharmacist  before following any medical regimen to see if it is safe and effective for you.

## 2024-06-17 NOTE — ASSESSMENT & PLAN NOTE
This is a chronic and unstable disease process.   Patient was started on Xanax 0.25 mg as needed daily in the recent past  Tried taking BuSpar and that made her feel sick  She was also on Zoloft for some time as well but did not feel that this helped her  Will start her on Atarax  Has panic attacks

## 2024-06-17 NOTE — ASSESSMENT & PLAN NOTE
This is a chronic and stable disease process.   Be related to her anxiety  Continue omeprazole 20 mg daily

## 2024-06-17 NOTE — PROGRESS NOTES
Adult Annual Physical  Name: Denisse Navarro      : 2000      MRN: [id number]  Encounter Provider: YESSENIA Bradley  Encounter Date: 2024   Encounter department: [facility]    Assessment & Plan   1. Annual physical exam  Assessment & Plan:  Lifestyle modification, diet and exercise discussed  Medications discussed and refilled appropriately  Labs discussed and ordered   Hepatitis C screening discussed  Depression screening performed  Anxiety screening performed  Urinary Incontinence screening performed   BMI discussed  Cervical cancer screening discussed and ordered   Tdap administered  Quantiferon gold ordered for work    Orders:  -     CBC and differential; Future; Expected date: 2024  -     Comprehensive metabolic panel; Future; Expected date: 2024  -     Quantiferon TB Gold Plus Assay; Future  2. Need for hepatitis C screening test  -     Hepatitis C Antibody; Future  3. Gastroesophageal reflux disease without esophagitis  Assessment & Plan:  This is a chronic and stable disease process.   Be related to her anxiety  Continue omeprazole 20 mg daily  Orders:  -     omeprazole (PriLOSEC) 20 mg delayed release capsule; Take 1 capsule (20 mg total) by mouth daily before breakfast  4. Anxiety and depression  Assessment & Plan:  This is a chronic and unstable disease process.   Patient was started on Xanax 0.25 mg as needed daily in the recent past  Tried taking BuSpar and that made her feel sick  She was also on Zoloft for some time as well but did not feel that this helped her  Will start her on Atarax  Has panic attacks     Orders:  -     omeprazole (PriLOSEC) 20 mg delayed release capsule; Take 1 capsule (20 mg total) by mouth daily before breakfast  -     hydrOXYzine HCL (ATARAX) 25 mg tablet; Take 1 tablet (25 mg total) by mouth every 6 (six) hours as needed for itching  5. Class 2 obesity due to excess calories without serious comorbidity with body mass  index (BMI) of 39.0 to 39.9 in adult  Assessment & Plan:  This is a chronic disease process.   Lifestyle modification, diet and exercise discussed  6. Screening for thyroid disorder  -     TSH, 3rd generation with Free T4 reflex; Future; Expected date: 06/18/2024  7. Screening for hyperlipidemia  -     Lipid panel; Future; Expected date: 06/18/2024  8. Screening for diabetes mellitus  -     Hemoglobin A1C; Future; Expected date: 06/18/2024  9. Screening for STDs (sexually transmitted diseases)  -     Chlamydia/GC amplified DNA by PCR; Future; Expected date: 06/18/2024  -     Chlamydia/GC amplified DNA by PCR  10. Vitamin D deficiency  -     Vitamin D 25 hydroxy; Future; Expected date: 06/18/2024  11. Moderate persistent asthma without complication  Assessment & Plan:  This is a chronic and stable disease process.   Continue  Proventil HFA  Symbicort  Flonase  Will start the patient on Singulair  Orders:  -     montelukast (SINGULAIR) 10 mg tablet; Take 1 tablet (10 mg total) by mouth daily at bedtime  -     omeprazole (PriLOSEC) 20 mg delayed release capsule; Take 1 capsule (20 mg total) by mouth daily before breakfast  12. Screening for cervical cancer  13. Encounter for immunization  -     TDAP VACCINE GREATER THAN OR EQUAL TO 6YO IM  14. Renal cyst  -     CT renal protocol; Future; Expected date: 06/18/2024  15. Recurrent nephrolithiasis  -     CT renal protocol; Future; Expected date: 06/18/2024    Immunizations and preventive care screenings were discussed with patient today. Appropriate education was printed on patient's after visit summary.    Counseling:  Alcohol/drug use: discussed moderation in alcohol intake, the recommendations for healthy alcohol use, and avoidance of illicit drug use.  Dental Health: discussed importance of regular tooth brushing, flossing, and dental visits.  Injury prevention: discussed safety/seat belts, safety helmets, smoke detectors, carbon dioxide detectors, and smoking near  bedding or upholstery.  Sexual health: discussed sexually transmitted diseases, partner selection, use of condoms, avoidance of unintended pregnancy, and contraceptive alternatives.  Exercise: the importance of regular exercise/physical activity was discussed. Recommend exercise 3-5 times per week for at least 30 minutes.       Depression Screening and Follow-up Plan: Patient was screened for depression during today's encounter. They screened negative with a PHQ-9 score of 0.        History of Present Illness     Adult Annual Physical:  Patient presents for annual physical.     Diet and Physical Activity:  - Diet/Nutrition: well balanced diet.  - Exercise: 1-2 times a week on average.    Depression Screening:    - PHQ-9 Score: 0    General Health:  - Sleep: sleeps poorly.  - Hearing: normal hearing bilateral ears.  - Vision: wears glasses.  - Dental: regular dental visits.    /GYN Health:  - Follows with GYN: yes.   - History of STDs: no    Advanced Care Planning:  - Has an advanced directive?: no    - Has a durable medical POA?: no    - ACP document given to patient?: no      Review of Systems   Constitutional:  Negative for activity change, chills, fatigue and fever.   HENT:  Negative for rhinorrhea and sore throat.    Eyes:  Negative for pain.   Respiratory:  Negative for cough and shortness of breath.    Cardiovascular:  Negative for chest pain, palpitations and leg swelling.   Gastrointestinal:  Negative for abdominal pain, constipation, diarrhea, nausea and vomiting.   Genitourinary:  Negative for difficulty urinating, flank pain, frequency and urgency.   Musculoskeletal:  Negative for gait problem, joint swelling and myalgias.   Skin:  Negative for color change.   Neurological:  Negative for dizziness, weakness, light-headedness and headaches.   Psychiatric/Behavioral:  Positive for sleep disturbance. The patient is nervous/anxious.    All other systems reviewed and are negative.    Current Outpatient  "Medications on File Prior to Visit   Medication Sig Dispense Refill    albuterol (PROVENTIL HFA,VENTOLIN HFA) 90 mcg/act inhaler Inhale 2 puffs every 6 (six) hours as needed for wheezing 18 g 5    ascorbic acid (VITAMIN C) 500 MG tablet Take 500 mg by mouth      budesonide-formoterol (SYMBICORT) 160-4.5 mcg/act inhaler Inhale 2 puffs 2 (two) times a day Rinse mouth after use. 10.2 g 2    fluticasone (FLONASE) 50 mcg/act nasal spray 1 spray into each nostril daily 15.8 mL 5    Multiple Vitamin (Multi-Day) TABS Take 1 tablet by mouth      ondansetron (ZOFRAN) 4 mg tablet Take 1 tablet (4 mg total) by mouth every 6 (six) hours 20 tablet 0    potassium citrate (UROCIT-K) 10 mEq Take 1 tablet (10 mEq total) by mouth 3 (three) times a day with meals 90 tablet 5    Probiotic Product (Misc Intestinal Vikki Regulat) CAPS Take by mouth      sodium chloride (OCEAN) 0.65 % nasal spray 1 spray into each nostril as needed       No current facility-administered medications on file prior to visit.        Objective     /67 (BP Location: Left arm, Patient Position: Sitting, Cuff Size: Standard)   Pulse 72   Temp 98 °F (36.7 °C) (Tympanic)   Ht 5' 7.25\" (1.708 m)   Wt 115 kg (253 lb 6.4 oz)   LMP 06/18/2024 (Exact Date)   SpO2 98%   BMI 39.39 kg/m²     Physical Exam  Vitals and nursing note reviewed.   Constitutional:       General: She is awake. She is not in acute distress.     Appearance: Normal appearance. She is well-developed.   HENT:      Head: Normocephalic and atraumatic.      Nose: Nose normal.      Mouth/Throat:      Mouth: Mucous membranes are moist.   Eyes:      Conjunctiva/sclera: Conjunctivae normal.   Cardiovascular:      Rate and Rhythm: Normal rate and regular rhythm.      Pulses: Normal pulses.      Heart sounds: Normal heart sounds. No murmur heard.  Pulmonary:      Effort: Pulmonary effort is normal. No respiratory distress.      Breath sounds: Normal breath sounds.   Abdominal:      General: Bowel " sounds are normal.      Palpations: Abdomen is soft.      Tenderness: There is no abdominal tenderness.   Musculoskeletal:      Cervical back: Neck supple.      Right lower leg: No edema.      Left lower leg: No edema.   Skin:     General: Skin is warm and dry.   Neurological:      Mental Status: She is alert and oriented to person, place, and time.   Psychiatric:         Attention and Perception: Attention normal.         Mood and Affect: Mood is anxious. Mood is not depressed.         Speech: Speech normal.         Behavior: Behavior normal. Behavior is cooperative.         Thought Content: Thought content normal.         Cognition and Memory: Cognition normal.         Judgment: Judgment normal.       Administrative Statements   I have spent a total time of 40 minutes on 06/19/24 In caring for this patient including Diagnostic results, Prognosis, Risks and benefits of tx options, Instructions for management, Patient and family education, Importance of tx compliance, Risk factor reductions, Impressions, Counseling / Coordination of care, Documenting in the medical record, Reviewing / ordering tests, medicine, procedures  , and Obtaining or reviewing history  .

## 2024-06-17 NOTE — ASSESSMENT & PLAN NOTE
Lifestyle modification, diet and exercise discussed  Medications discussed and refilled appropriately  Labs discussed and ordered   Hepatitis C screening discussed  Depression screening performed  Anxiety screening performed  Urinary Incontinence screening performed   BMI discussed  Cervical cancer screening discussed and ordered   Tdap administered  Quantiferon gold ordered for work

## 2024-06-18 ENCOUNTER — OFFICE VISIT (OUTPATIENT)
Dept: INTERNAL MEDICINE CLINIC | Facility: CLINIC | Age: 24
End: 2024-06-18
Payer: COMMERCIAL

## 2024-06-18 VITALS
OXYGEN SATURATION: 98 % | HEART RATE: 72 BPM | BODY MASS INDEX: 39.77 KG/M2 | WEIGHT: 253.4 LBS | TEMPERATURE: 98 F | HEIGHT: 67 IN | DIASTOLIC BLOOD PRESSURE: 67 MMHG | SYSTOLIC BLOOD PRESSURE: 130 MMHG

## 2024-06-18 DIAGNOSIS — F32.A ANXIETY AND DEPRESSION: ICD-10-CM

## 2024-06-18 DIAGNOSIS — E55.9 VITAMIN D DEFICIENCY: ICD-10-CM

## 2024-06-18 DIAGNOSIS — Z12.4 SCREENING FOR CERVICAL CANCER: ICD-10-CM

## 2024-06-18 DIAGNOSIS — E66.09 CLASS 2 OBESITY DUE TO EXCESS CALORIES WITHOUT SERIOUS COMORBIDITY WITH BODY MASS INDEX (BMI) OF 39.0 TO 39.9 IN ADULT: ICD-10-CM

## 2024-06-18 DIAGNOSIS — Z13.29 SCREENING FOR THYROID DISORDER: ICD-10-CM

## 2024-06-18 DIAGNOSIS — Z13.1 SCREENING FOR DIABETES MELLITUS: ICD-10-CM

## 2024-06-18 DIAGNOSIS — Z11.59 NEED FOR HEPATITIS C SCREENING TEST: ICD-10-CM

## 2024-06-18 DIAGNOSIS — Z00.00 ANNUAL PHYSICAL EXAM: Primary | ICD-10-CM

## 2024-06-18 DIAGNOSIS — N20.0 RECURRENT NEPHROLITHIASIS: Chronic | ICD-10-CM

## 2024-06-18 DIAGNOSIS — K21.9 GASTROESOPHAGEAL REFLUX DISEASE WITHOUT ESOPHAGITIS: ICD-10-CM

## 2024-06-18 DIAGNOSIS — J45.40 MODERATE PERSISTENT ASTHMA WITHOUT COMPLICATION: Chronic | ICD-10-CM

## 2024-06-18 DIAGNOSIS — Z23 ENCOUNTER FOR IMMUNIZATION: ICD-10-CM

## 2024-06-18 DIAGNOSIS — N28.1 RENAL CYST: Chronic | ICD-10-CM

## 2024-06-18 DIAGNOSIS — Z11.3 SCREENING FOR STDS (SEXUALLY TRANSMITTED DISEASES): ICD-10-CM

## 2024-06-18 DIAGNOSIS — Z13.220 SCREENING FOR HYPERLIPIDEMIA: ICD-10-CM

## 2024-06-18 DIAGNOSIS — F41.9 ANXIETY AND DEPRESSION: ICD-10-CM

## 2024-06-18 PROCEDURE — 90715 TDAP VACCINE 7 YRS/> IM: CPT

## 2024-06-18 PROCEDURE — 99385 PREV VISIT NEW AGE 18-39: CPT | Performed by: NURSE PRACTITIONER

## 2024-06-18 PROCEDURE — 90471 IMMUNIZATION ADMIN: CPT

## 2024-06-18 PROCEDURE — 87591 N.GONORRHOEAE DNA AMP PROB: CPT | Performed by: NURSE PRACTITIONER

## 2024-06-18 PROCEDURE — 87491 CHLMYD TRACH DNA AMP PROBE: CPT | Performed by: NURSE PRACTITIONER

## 2024-06-18 RX ORDER — MONTELUKAST SODIUM 10 MG/1
10 TABLET ORAL
Qty: 90 TABLET | Refills: 3 | Status: SHIPPED | OUTPATIENT
Start: 2024-06-18

## 2024-06-18 RX ORDER — OMEPRAZOLE 20 MG/1
20 CAPSULE, DELAYED RELEASE ORAL
Qty: 90 CAPSULE | Refills: 3 | Status: SHIPPED | OUTPATIENT
Start: 2024-06-18

## 2024-06-18 RX ORDER — HYDROXYZINE HYDROCHLORIDE 25 MG/1
25 TABLET, FILM COATED ORAL EVERY 6 HOURS PRN
Qty: 90 TABLET | Refills: 3 | Status: SHIPPED | OUTPATIENT
Start: 2024-06-18

## 2024-06-18 NOTE — TELEPHONE ENCOUNTER
06/18/24 1:03 PM     Chart reviewed for HIV was/were submitted to the patient's insurance.     Tanika Bautista   [facility]

## 2024-06-19 PROBLEM — E55.9 VITAMIN D DEFICIENCY: Status: ACTIVE | Noted: 2024-06-19

## 2024-06-19 PROBLEM — Z23 ENCOUNTER FOR IMMUNIZATION: Status: ACTIVE | Noted: 2024-06-19

## 2024-06-19 PROBLEM — Z11.3 SCREENING FOR STDS (SEXUALLY TRANSMITTED DISEASES): Status: ACTIVE | Noted: 2024-06-19

## 2024-06-19 LAB
C TRACH DNA SPEC QL NAA+PROBE: NEGATIVE
N GONORRHOEA DNA SPEC QL NAA+PROBE: NEGATIVE

## 2024-06-25 DIAGNOSIS — F41.9 ANXIETY AND DEPRESSION: Primary | Chronic | ICD-10-CM

## 2024-06-25 DIAGNOSIS — F32.A ANXIETY AND DEPRESSION: Primary | Chronic | ICD-10-CM

## 2024-06-25 NOTE — PROGRESS NOTES
"6/19/2024  This is a chronic and unstable disease process.   Patient was started on Xanax 0.25 mg as needed daily in the recent past  Tried taking BuSpar and that made her feel sick  She was also on Zoloft for some time as well but did not feel that this helped her  Will start her on Atarax  Has panic attacks     6/25/2024  Worsening anxiety  Public crying  Will start her on   Trintellix 10mg daily       Denisse Navarro is a 23 y.o. female who presents for follow up of anxiety disorder. Current symptoms: difficulty concentrating, fatigue, feelings of losing control, insomnia, irritable, palpitations, psychomotor agitation, racing thoughts, sweating, crying . She denies current suicidal and homicidal ideation. She complains of the following side effects from the treatment: nervousness and worsening anxiety. 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       Assessment & Plan   Anxiety Disorder - worsening    Reviewed concept of anxiety as biochemical imbalance of neurotransmitters and rationale for treatment..  "

## 2024-07-17 PROBLEM — Z12.4 SCREENING FOR CERVICAL CANCER: Status: RESOLVED | Noted: 2024-06-17 | Resolved: 2024-07-17

## 2024-07-17 PROBLEM — Z13.220 SCREENING FOR HYPERLIPIDEMIA: Status: RESOLVED | Noted: 2024-06-17 | Resolved: 2024-07-17

## 2024-07-17 PROBLEM — Z13.1 SCREENING FOR DIABETES MELLITUS: Status: RESOLVED | Noted: 2024-06-17 | Resolved: 2024-07-17

## 2024-07-17 PROBLEM — Z11.59 NEED FOR HEPATITIS C SCREENING TEST: Status: RESOLVED | Noted: 2024-06-17 | Resolved: 2024-07-17

## 2024-07-17 PROBLEM — Z13.29 SCREENING FOR THYROID DISORDER: Status: RESOLVED | Noted: 2024-06-17 | Resolved: 2024-07-17

## 2024-07-19 PROBLEM — Z11.3 SCREENING FOR STDS (SEXUALLY TRANSMITTED DISEASES): Status: RESOLVED | Noted: 2024-06-19 | Resolved: 2024-07-19

## 2024-07-21 ENCOUNTER — NURSE TRIAGE (OUTPATIENT)
Dept: OTHER | Facility: OTHER | Age: 24
End: 2024-07-21

## 2024-07-21 DIAGNOSIS — R11.0 NAUSEA IN ADULT: Primary | ICD-10-CM

## 2024-07-21 RX ORDER — PROCHLORPERAZINE 25 MG
25 SUPPOSITORY, RECTAL RECTAL EVERY 12 HOURS PRN
Qty: 12 SUPPOSITORY | Refills: 1 | Status: SHIPPED | OUTPATIENT
Start: 2024-07-21

## 2024-07-21 NOTE — TELEPHONE ENCOUNTER
"Regarding: sick appt / sun poisoning  ----- Message from Philly VERDUGO sent at 7/21/2024  6:14 PM EDT -----  \"I have sun poisoning and I am throwing up. I would like to schedule an appointment for tomorrow.\"    "

## 2024-07-21 NOTE — TELEPHONE ENCOUNTER
Reason for Disposition  • Caller has already spoken with the PCP and has no further questions.    Answer Assessment - Initial Assessment Questions  Patient states she spoke with Annie Art today regarding her sunburn, discoloration of skin, and vomiting. Script was sent to her pharmacy and she was advised to call and make an appt for Monday.    Protocols used: No Contact or Duplicate Contact Call-ADULT-      Appt scheduled for Monday at 1040am with Annie HOUSTON

## 2024-07-22 ENCOUNTER — OFFICE VISIT (OUTPATIENT)
Dept: INTERNAL MEDICINE CLINIC | Facility: CLINIC | Age: 24
End: 2024-07-22
Payer: COMMERCIAL

## 2024-07-22 VITALS
SYSTOLIC BLOOD PRESSURE: 120 MMHG | HEIGHT: 67 IN | WEIGHT: 255.25 LBS | DIASTOLIC BLOOD PRESSURE: 68 MMHG | TEMPERATURE: 97.6 F | HEART RATE: 74 BPM | OXYGEN SATURATION: 99 % | BODY MASS INDEX: 40.06 KG/M2

## 2024-07-22 DIAGNOSIS — R11.2 NAUSEA AND VOMITING, UNSPECIFIED VOMITING TYPE: Primary | ICD-10-CM

## 2024-07-22 DIAGNOSIS — L55.9 SUNBURN: Chronic | ICD-10-CM

## 2024-07-22 DIAGNOSIS — D22.9 MULTIPLE ATYPICAL SKIN MOLES: ICD-10-CM

## 2024-07-22 LAB
25(OH)D3+25(OH)D2 SERPL-MCNC: 22 NG/ML (ref 30–100)
ALBUMIN SERPL-MCNC: 3.8 G/DL (ref 3.5–5.7)
ALP SERPL-CCNC: 57 U/L (ref 35–120)
ALT SERPL-CCNC: 7 U/L
ANION GAP SERPL CALCULATED.3IONS-SCNC: 9 MMOL/L (ref 3–11)
AST SERPL-CCNC: 12 U/L
BASOPHILS # BLD AUTO: 0.1 THOU/CMM (ref 0–0.1)
BASOPHILS NFR BLD AUTO: 1 %
BILIRUB SERPL-MCNC: 0.4 MG/DL (ref 0.2–1)
BUN SERPL-MCNC: 8 MG/DL (ref 7–25)
CALCIUM SERPL-MCNC: 9 MG/DL (ref 8.5–10.1)
CHLORIDE SERPL-SCNC: 108 MMOL/L (ref 100–109)
CHOLEST SERPL-MCNC: 112 MG/DL
CHOLEST/HDLC SERPL: 2.7 {RATIO}
CO2 SERPL-SCNC: 24 MMOL/L (ref 21–31)
CREAT SERPL-MCNC: 0.54 MG/DL (ref 0.4–1.1)
CYTOLOGY CMNT CVX/VAG CYTO-IMP: NORMAL
DIFFERENTIAL METHOD BLD: ABNORMAL
EOSINOPHIL # BLD AUTO: 0.3 THOU/CMM (ref 0–0.5)
EOSINOPHIL NFR BLD AUTO: 4 %
ERYTHROCYTE [DISTWIDTH] IN BLOOD BY AUTOMATED COUNT: 13.5 % (ref 12–16)
EST. AVERAGE GLUCOSE BLD GHB EST-MCNC: 100 MG/DL
GFR/BSA.PRED SERPLBLD CYS-BASED-ARV: 133 ML/MIN/{1.73_M2}
GLUCOSE SERPL-MCNC: 83 MG/DL (ref 65–99)
HBA1C MFR BLD: 5.1 %
HCT VFR BLD AUTO: 34.7 % (ref 35–43)
HCV AB SERPL QL IA: NONREACTIVE
HDLC SERPL-MCNC: 42 MG/DL (ref 23–92)
HGB BLD-MCNC: 11.5 G/DL (ref 11.5–14.5)
LDLC SERPL CALC-MCNC: 55 MG/DL
LYMPHOCYTES # BLD AUTO: 1.9 THOU/CMM (ref 1–3)
LYMPHOCYTES NFR BLD AUTO: 25 %
MCH RBC QN AUTO: 28.1 PG (ref 26–34)
MCHC RBC AUTO-ENTMCNC: 33.1 G/DL (ref 32–37)
MCV RBC AUTO: 85 FL (ref 80–100)
MONOCYTES # BLD AUTO: 0.6 THOU/CMM (ref 0.3–1)
MONOCYTES NFR BLD AUTO: 8 %
NEUTROPHILS # BLD AUTO: 4.7 THOU/CMM (ref 1.8–7.8)
NEUTROPHILS NFR BLD AUTO: 62 %
NONHDLC SERPL-MCNC: 70 MG/DL
PLATELET # BLD AUTO: 331 THOU/CMM (ref 140–350)
PMV BLD REES-ECKER: 9.2 FL (ref 7.5–11.3)
POTASSIUM SERPL-SCNC: 4.4 MMOL/L (ref 3.5–5.2)
PROT SERPL-MCNC: 6.6 G/DL (ref 6.3–8.3)
RBC # BLD AUTO: 4.09 MILL/CMM (ref 3.7–4.7)
SODIUM SERPL-SCNC: 141 MMOL/L (ref 135–145)
TRIGL SERPL-MCNC: 76 MG/DL
TSH SERPL-ACNC: 1.55 UIU/ML (ref 0.45–5.33)
WBC # BLD AUTO: 7.5 THOU/CMM (ref 4–10)

## 2024-07-22 PROCEDURE — 99214 OFFICE O/P EST MOD 30 MIN: CPT | Performed by: NURSE PRACTITIONER

## 2024-07-22 RX ORDER — DOXYCYCLINE HYCLATE 100 MG/1
100 CAPSULE ORAL EVERY 12 HOURS SCHEDULED
Qty: 20 CAPSULE | Refills: 0 | Status: SHIPPED | OUTPATIENT
Start: 2024-07-22 | End: 2024-08-01

## 2024-07-22 RX ORDER — FLUCONAZOLE 200 MG/1
200 TABLET ORAL DAILY
Qty: 5 TABLET | Refills: 0 | Status: SHIPPED | OUTPATIENT
Start: 2024-07-22 | End: 2024-07-27

## 2024-07-22 NOTE — PROGRESS NOTES
Ambulatory Visit  Name: Denisse Navarro      : 2000      MRN: [id number]  Encounter Provider: YESSENIA Bradley  Encounter Date: 2024   Encounter department: [facility]    Assessment & Plan   1. Nausea and vomiting, unspecified vomiting type  Assessment & Plan:  Compazine suppositories   Orders:  -     doxycycline hyclate (VIBRAMYCIN) 100 mg capsule; Take 1 capsule (100 mg total) by mouth every 12 (twelve) hours for 10 days  -     fluconazole (DIFLUCAN) 200 mg tablet; Take 1 tablet (200 mg total) by mouth daily for 5 days  2. Sunburn  Assessment & Plan:  Dermaplast  Take doxycycline and diflucan   Keep hydrated  Okay to use aloe gel  Take cold/cool bath and shower  Orders:  -     doxycycline hyclate (VIBRAMYCIN) 100 mg capsule; Take 1 capsule (100 mg total) by mouth every 12 (twelve) hours for 10 days  -     fluconazole (DIFLUCAN) 200 mg tablet; Take 1 tablet (200 mg total) by mouth daily for 5 days  -     benzocaine-menthol-lanolin-aloe (DERMOPLAST) 20-0.5 % topical spray; Apply 1 Application topically 4 (four) times a day as needed for mild pain  3. Multiple atypical skin moles  -     Ambulatory Referral to Dermatology; Future       History of Present Illness     The patient is here today to discuss her nausea and sunburn. She did wear a very high SPF, but still got burnt. She states that the burning sensation started about 2-3 hours after returning from the beach. Please continue to the PORCH section of the note for details of today's visit.          Review of Systems   Skin:  Positive for color change.     Current Outpatient Medications on File Prior to Visit   Medication Sig Dispense Refill    albuterol (PROVENTIL HFA,VENTOLIN HFA) 90 mcg/act inhaler Inhale 2 puffs every 6 (six) hours as needed for wheezing 18 g 5    ascorbic acid (VITAMIN C) 500 MG tablet Take 500 mg by mouth      budesonide-formoterol (SYMBICORT) 160-4.5 mcg/act inhaler Inhale 2 puffs 2 (two) times a  "day Rinse mouth after use. 10.2 g 2    fluticasone (FLONASE) 50 mcg/act nasal spray 1 spray into each nostril daily 15.8 mL 5    hydrOXYzine HCL (ATARAX) 25 mg tablet Take 1 tablet (25 mg total) by mouth every 6 (six) hours as needed for itching 90 tablet 3    montelukast (SINGULAIR) 10 mg tablet Take 1 tablet (10 mg total) by mouth daily at bedtime 90 tablet 3    Multiple Vitamin (Multi-Day) TABS Take 1 tablet by mouth      omeprazole (PriLOSEC) 20 mg delayed release capsule Take 1 capsule (20 mg total) by mouth daily before breakfast 90 capsule 3    ondansetron (ZOFRAN) 4 mg tablet Take 1 tablet (4 mg total) by mouth every 6 (six) hours 20 tablet 0    potassium citrate (UROCIT-K) 10 mEq Take 1 tablet (10 mEq total) by mouth 3 (three) times a day with meals 90 tablet 5    Probiotic Product (Misc Intestinal Vikki Regulat) CAPS Take by mouth      prochlorperazine (COMPAZINE) 25 mg suppository Insert 1 suppository (25 mg total) into the rectum every 12 (twelve) hours as needed for nausea or vomiting 12 suppository 1    sodium chloride (OCEAN) 0.65 % nasal spray 1 spray into each nostril as needed      vortioxetine (TRINTELLIX) 10 MG tablet Take 1 tablet (10 mg total) by mouth daily 30 tablet 5     No current facility-administered medications on file prior to visit.      Objective     /68   Pulse 74   Temp 97.6 °F (36.4 °C)   Ht 5' 7.25\" (1.708 m)   Wt 116 kg (255 lb 4 oz)   LMP 07/21/2024 (Exact Date)   SpO2 99%   BMI 39.68 kg/m²     Physical Exam  Skin:             Administrative Statements   I have spent a total time of 30 minutes in caring for this patient on the day of the visit/encounter including Diagnostic results, Prognosis, Risks and benefits of tx options, Instructions for management, Patient and family education, Importance of tx compliance, Risk factor reductions, Impressions, Counseling / Coordination of care, Documenting in the medical record, Reviewing / ordering tests, medicine, procedures "  , and Obtaining or reviewing history  .

## 2024-07-22 NOTE — ASSESSMENT & PLAN NOTE
Dermaplast  Take doxycycline and diflucan   Keep hydrated  Okay to use aloe gel  Take cold/cool bath and shower

## 2024-07-22 NOTE — LETTER
July 22, 2024     Patient: Denisse Navarro  YOB: 2000  Date of Visit: 7/22/2024      To Whom it May Concern:    Denisse Navarro is under my professional care. Denisse was seen in my office on 7/22/2024. Denisse may return to work on 7/23/2024 .    If you have any questions or concerns, please don't hesitate to call.         Sincerely,          YESSENIA Bradley        CC: No Recipients

## 2024-07-23 LAB
GAMMA INTERFERON BACKGROUND BLD IA-ACNC: 0.05 IU/ML
M TB IFN-G BLD-IMP: NEGATIVE
M TB IFN-G CD4+ BCKGRND COR BLD-ACNC: 0 IU/ML
M TB IFN-G CD4+ BCKGRND COR BLD-ACNC: 0 IU/ML
MITOGEN IGNF BLD-ACNC: >10 IU/ML
QUANTIFERON INCUBATION COMMENT: NORMAL

## 2024-07-29 DIAGNOSIS — E55.9 VITAMIN D DEFICIENCY: Primary | ICD-10-CM

## 2024-07-29 RX ORDER — CHOLECALCIFEROL (VITAMIN D3) 50 MCG
4000 TABLET ORAL DAILY
Qty: 180 TABLET | Refills: 3 | Status: SHIPPED | OUTPATIENT
Start: 2024-07-29

## 2024-08-10 DIAGNOSIS — J45.40 MODERATE PERSISTENT ASTHMA WITHOUT COMPLICATION: ICD-10-CM

## 2024-08-12 DIAGNOSIS — J45.40 MODERATE PERSISTENT ASTHMA WITHOUT COMPLICATION: Chronic | ICD-10-CM

## 2024-08-12 DIAGNOSIS — F32.A ANXIETY AND DEPRESSION: Primary | Chronic | ICD-10-CM

## 2024-08-12 DIAGNOSIS — F41.9 ANXIETY AND DEPRESSION: Chronic | ICD-10-CM

## 2024-08-12 DIAGNOSIS — F41.9 ANXIETY AND DEPRESSION: Primary | Chronic | ICD-10-CM

## 2024-08-12 DIAGNOSIS — F32.A ANXIETY AND DEPRESSION: Chronic | ICD-10-CM

## 2024-08-12 RX ORDER — ALBUTEROL SULFATE 0.83 MG/ML
2.5 SOLUTION RESPIRATORY (INHALATION) EVERY 6 HOURS PRN
Qty: 360 ML | Refills: 1 | Status: SHIPPED | OUTPATIENT
Start: 2024-08-12

## 2024-08-12 RX ORDER — BUDESONIDE AND FORMOTEROL FUMARATE DIHYDRATE 160; 4.5 UG/1; UG/1
2 AEROSOL RESPIRATORY (INHALATION) 2 TIMES DAILY
Qty: 10.2 G | Refills: 0 | Status: SHIPPED | OUTPATIENT
Start: 2024-08-12 | End: 2024-08-13 | Stop reason: SDUPTHER

## 2024-08-12 RX ORDER — ALBUTEROL SULFATE 0.83 MG/ML
2.5 SOLUTION RESPIRATORY (INHALATION) EVERY 6 HOURS PRN
Qty: 360 ML | Refills: 1 | Status: SHIPPED | OUTPATIENT
Start: 2024-08-12 | End: 2024-08-12 | Stop reason: SDUPTHER

## 2024-08-12 RX ORDER — ALPRAZOLAM 0.5 MG
0.5 TABLET ORAL 2 TIMES DAILY PRN
Qty: 60 TABLET | Refills: 0 | Status: SHIPPED | OUTPATIENT
Start: 2024-08-12

## 2024-08-12 RX ORDER — ALPRAZOLAM 0.5 MG
0.5 TABLET ORAL 2 TIMES DAILY PRN
Qty: 60 TABLET | Refills: 0 | Status: SHIPPED | OUTPATIENT
Start: 2024-08-12 | End: 2024-08-12 | Stop reason: SDUPTHER

## 2024-08-12 NOTE — TELEPHONE ENCOUNTER
Last Office Note state : Return in about 6 months (around 3/11/2024).  Patient needs an appointment. Please contact the patient to schedule an appointment. 30D Courtesy refill provided.

## 2024-08-13 DIAGNOSIS — J45.40 MODERATE PERSISTENT ASTHMA WITHOUT COMPLICATION: ICD-10-CM

## 2024-08-13 RX ORDER — ALBUTEROL SULFATE 90 UG/1
2 AEROSOL, METERED RESPIRATORY (INHALATION) EVERY 6 HOURS PRN
Qty: 18 G | Refills: 1 | Status: SHIPPED | OUTPATIENT
Start: 2024-08-13

## 2024-08-13 RX ORDER — BUDESONIDE AND FORMOTEROL FUMARATE DIHYDRATE 160; 4.5 UG/1; UG/1
2 AEROSOL RESPIRATORY (INHALATION) 2 TIMES DAILY
Qty: 10.2 G | Refills: 0 | Status: SHIPPED | OUTPATIENT
Start: 2024-08-13

## 2024-08-13 NOTE — TELEPHONE ENCOUNTER
Re sent trans failed    Route: Inhalation        E-Prescribing Status: Receipt confirmed by pharmacy (8/13/2024  9:01 AM EDT)

## 2024-09-10 DIAGNOSIS — J45.40 MODERATE PERSISTENT ASTHMA WITHOUT COMPLICATION: ICD-10-CM

## 2024-09-11 RX ORDER — BUDESONIDE AND FORMOTEROL FUMARATE DIHYDRATE 160; 4.5 UG/1; UG/1
2 AEROSOL RESPIRATORY (INHALATION) 2 TIMES DAILY
Qty: 10.2 G | Refills: 0 | Status: SHIPPED | OUTPATIENT
Start: 2024-09-11

## 2024-09-11 NOTE — TELEPHONE ENCOUNTER
Please advise, attempted to contact patient to schedule a follow up appointment but could not leave a message.

## 2024-10-07 DIAGNOSIS — F32.A ANXIETY AND DEPRESSION: Chronic | ICD-10-CM

## 2024-10-07 DIAGNOSIS — F41.9 ANXIETY AND DEPRESSION: Chronic | ICD-10-CM

## 2024-10-08 ENCOUNTER — TELEPHONE (OUTPATIENT)
Dept: INTERNAL MEDICINE CLINIC | Facility: CLINIC | Age: 24
End: 2024-10-08

## 2024-10-08 DIAGNOSIS — J45.40 MODERATE PERSISTENT ASTHMA WITHOUT COMPLICATION: Primary | Chronic | ICD-10-CM

## 2024-10-08 RX ORDER — ALPRAZOLAM 0.5 MG
0.5 TABLET ORAL 2 TIMES DAILY PRN
Qty: 60 TABLET | Refills: 0 | Status: SHIPPED | OUTPATIENT
Start: 2024-10-08

## 2024-10-08 RX ORDER — FLUTICASONE PROPIONATE AND SALMETEROL 250; 50 UG/1; UG/1
1 POWDER RESPIRATORY (INHALATION) 2 TIMES DAILY
Qty: 180 BLISTER | Refills: 1 | Status: SHIPPED | OUTPATIENT
Start: 2024-10-08

## 2024-10-08 NOTE — TELEPHONE ENCOUNTER
Lm for patient to call the office, is she switching PCP's to stay at [place] office or going to [place] office with Annie Art

## 2024-11-11 DIAGNOSIS — F41.9 ANXIETY AND DEPRESSION: Chronic | ICD-10-CM

## 2024-11-11 DIAGNOSIS — F32.A ANXIETY AND DEPRESSION: Chronic | ICD-10-CM

## 2024-11-13 RX ORDER — ALPRAZOLAM 0.5 MG
0.5 TABLET ORAL 2 TIMES DAILY PRN
Qty: 60 TABLET | Refills: 0 | Status: SHIPPED | OUTPATIENT
Start: 2024-11-13

## 2025-01-05 DIAGNOSIS — F41.9 ANXIETY AND DEPRESSION: Chronic | ICD-10-CM

## 2025-01-05 DIAGNOSIS — F32.A ANXIETY AND DEPRESSION: Chronic | ICD-10-CM

## 2025-01-07 RX ORDER — ALPRAZOLAM 0.5 MG
0.5 TABLET ORAL 2 TIMES DAILY PRN
Qty: 60 TABLET | Refills: 0 | Status: SHIPPED | OUTPATIENT
Start: 2025-01-07

## 2025-01-15 ENCOUNTER — OFFICE VISIT (OUTPATIENT)
Dept: FAMILY MEDICINE CLINIC | Facility: CLINIC | Age: 25
End: 2025-01-15
Payer: COMMERCIAL

## 2025-01-15 VITALS
SYSTOLIC BLOOD PRESSURE: 122 MMHG | BODY MASS INDEX: 36.36 KG/M2 | DIASTOLIC BLOOD PRESSURE: 72 MMHG | WEIGHT: 254 LBS | HEIGHT: 70 IN | RESPIRATION RATE: 20 BRPM | HEART RATE: 84 BPM | OXYGEN SATURATION: 99 % | TEMPERATURE: 98.5 F

## 2025-01-15 DIAGNOSIS — E83.42 HYPOMAGNESEMIA: ICD-10-CM

## 2025-01-15 DIAGNOSIS — Z13.220 SCREENING FOR HYPERLIPIDEMIA: ICD-10-CM

## 2025-01-15 DIAGNOSIS — G43.E01 CHRONIC MIGRAINE WITH AURA AND WITH STATUS MIGRAINOSUS, NOT INTRACTABLE: Chronic | ICD-10-CM

## 2025-01-15 DIAGNOSIS — E55.9 VITAMIN D DEFICIENCY: ICD-10-CM

## 2025-01-15 DIAGNOSIS — Z12.4 SCREENING FOR CERVICAL CANCER: ICD-10-CM

## 2025-01-15 DIAGNOSIS — Z13.1 SCREENING FOR DIABETES MELLITUS: Chronic | ICD-10-CM

## 2025-01-15 DIAGNOSIS — E66.09 CLASS 2 OBESITY DUE TO EXCESS CALORIES WITHOUT SERIOUS COMORBIDITY WITH BODY MASS INDEX (BMI) OF 39.0 TO 39.9 IN ADULT: Chronic | ICD-10-CM

## 2025-01-15 DIAGNOSIS — E66.812 CLASS 2 OBESITY DUE TO EXCESS CALORIES WITHOUT SERIOUS COMORBIDITY WITH BODY MASS INDEX (BMI) OF 39.0 TO 39.9 IN ADULT: Chronic | ICD-10-CM

## 2025-01-15 DIAGNOSIS — J45.40 MODERATE PERSISTENT ASTHMA WITHOUT COMPLICATION: Chronic | ICD-10-CM

## 2025-01-15 DIAGNOSIS — Z23 NEED FOR COVID-19 VACCINE: ICD-10-CM

## 2025-01-15 DIAGNOSIS — Z23 ENCOUNTER FOR IMMUNIZATION: ICD-10-CM

## 2025-01-15 DIAGNOSIS — F32.A ANXIETY AND DEPRESSION: Primary | Chronic | ICD-10-CM

## 2025-01-15 DIAGNOSIS — K21.9 GASTROESOPHAGEAL REFLUX DISEASE WITHOUT ESOPHAGITIS: Chronic | ICD-10-CM

## 2025-01-15 DIAGNOSIS — Z00.00 ANNUAL PHYSICAL EXAM: ICD-10-CM

## 2025-01-15 DIAGNOSIS — F41.9 ANXIETY AND DEPRESSION: Primary | Chronic | ICD-10-CM

## 2025-01-15 PROBLEM — L55.9 SUNBURN: Chronic | Status: RESOLVED | Noted: 2024-07-22 | Resolved: 2025-01-15

## 2025-01-15 PROCEDURE — 99215 OFFICE O/P EST HI 40 MIN: CPT | Performed by: NURSE PRACTITIONER

## 2025-01-15 RX ORDER — DULOXETIN HYDROCHLORIDE 20 MG/1
20 CAPSULE, DELAYED RELEASE ORAL DAILY
Qty: 90 CAPSULE | Refills: 3 | Status: SHIPPED | OUTPATIENT
Start: 2025-01-15

## 2025-01-15 RX ORDER — BUDESONIDE, GLYCOPYRROLATE, AND FORMOTEROL FUMARATE 160; 9; 4.8 UG/1; UG/1; UG/1
2 AEROSOL, METERED RESPIRATORY (INHALATION) 2 TIMES DAILY
Qty: 10.7 G | Refills: 6 | Status: SHIPPED | OUTPATIENT
Start: 2025-01-15

## 2025-01-15 RX ORDER — ALBUTEROL SULFATE AND BUDESONIDE 90; 80 UG/1; UG/1
2 AEROSOL, METERED RESPIRATORY (INHALATION) DAILY
Qty: 10.7 G | Refills: 6 | Status: SHIPPED | OUTPATIENT
Start: 2025-01-15

## 2025-01-15 NOTE — ASSESSMENT & PLAN NOTE
Gave sample of Breztri and Airsupra   Orders:  •  Budeson-Glycopyrrol-Formoterol (Breztri Aerosphere) 160-9-4.8 MCG/ACT AERO; Inhale 2 puffs 2 (two) times a day Rinse mouth after use.  •  Albuterol-Budesonide (Airsupra) 90-80 MCG/ACT AERO; Inhale 2 Inhalations in the morning  •  Complete PFT with post bronchodilator; Future

## 2025-01-15 NOTE — ASSESSMENT & PLAN NOTE
Gave sample of ubrelvy   Orders:  •  Atogepant 10 MG TABS; Take 10 mg by mouth in the morning  •  Ubrogepant (UBRELVY) 100 MG tablet; Take 1 tablet (100 mg) one time as needed for migraine. May repeat one additional tablet (100 mg) at least two hours after the first dose. Do not use more than two doses per day, or for more than eight days per month.

## 2025-01-15 NOTE — PROGRESS NOTES
Name: Denisse Navarro      : 2000      MRN: [id number]  Encounter Provider: YESSENIA Bradley  Encounter Date: 1/15/2025   Encounter department: [facility]  :  Assessment & Plan  Screening for cervical cancer    Orders:  •  Ambulatory referral to Obstetrics / Gynecology; Future    Encounter for immunization         Need for COVID-19 vaccine         Gastroesophageal reflux disease without esophagitis         Moderate persistent asthma without complication  Gave sample of Breztri and Airsupra   Orders:  •  Budeson-Glycopyrrol-Formoterol (Breztri Aerosphere) 160-9-4.8 MCG/ACT AERO; Inhale 2 puffs 2 (two) times a day Rinse mouth after use.  •  Albuterol-Budesonide (Airsupra) 90-80 MCG/ACT AERO; Inhale 2 Inhalations in the morning  •  Complete PFT with post bronchodilator; Future    Anxiety and depression    Orders:  •  DULoxetine (CYMBALTA) 20 mg capsule; Take 1 capsule (20 mg total) by mouth daily    Class 2 obesity due to excess calories without serious comorbidity with body mass index (BMI) of 39.0 to 39.9 in adult         Screening for diabetes mellitus    Orders:  •  Hemoglobin A1C; Future    Screening for hyperlipidemia    Orders:  •  Lipid Panel with Direct LDL reflex; Future    Vitamin D deficiency    Orders:  •  Vitamin D 25 hydroxy; Future    Annual physical exam    Orders:  •  CBC and differential; Future  •  Comprehensive metabolic panel; Future    Hypomagnesemia    Orders:  •  Magnesium; Future    Chronic migraine with aura and with status migrainosus, not intractable  Gave sample of ubrelvy   Orders:  •  Atogepant 10 MG TABS; Take 10 mg by mouth in the morning  •  Ubrogepant (UBRELVY) 100 MG tablet; Take 1 tablet (100 mg) one time as needed for migraine. May repeat one additional tablet (100 mg) at least two hours after the first dose. Do not use more than two doses per day, or for more than eight days per month.           History of Present Illness     The  "patient is here today to discuss her chronic migraine, and her anxiety. Also discussed her asthma.   I reviewed their medical conditions, medications, laboratory and radiology studies.  I reviewed their scheduled future lab studies with the patient.   The patient's current treatment plan is effective.   There is no change in the current therapy.   I ask the patient to continue their current therapy.   The patient voiced their understanding and agreement.   Follow up after 6/18/2025 for her annual physical.  Please continue to the PORCH section of the note for details of today's visit.             Review of Systems   Constitutional:  Negative for activity change, chills, fatigue and fever.   HENT:  Negative for rhinorrhea and sore throat.    Eyes:  Negative for pain.   Respiratory:  Positive for cough and shortness of breath.    Cardiovascular:  Negative for chest pain, palpitations and leg swelling.   Gastrointestinal:  Negative for abdominal pain, constipation, diarrhea, nausea and vomiting.   Genitourinary:  Negative for difficulty urinating, flank pain, frequency and urgency.   Musculoskeletal:  Negative for gait problem, joint swelling and myalgias.   Skin:  Negative for color change.   Neurological:  Positive for headaches. Negative for dizziness, weakness and light-headedness.   Psychiatric/Behavioral:  Negative for sleep disturbance. The patient is nervous/anxious.    All other systems reviewed and are negative.      Objective   /72 (Patient Position: Sitting, Cuff Size: Large)   Pulse 84   Temp 98.5 °F (36.9 °C) (Tympanic)   Resp 20   Ht 5' 10\" (1.778 m)   Wt 115 kg (254 lb)   SpO2 99%   BMI 36.45 kg/m²      Physical Exam  Vitals and nursing note reviewed.   Constitutional:       General: She is awake. She is not in acute distress.     Appearance: Normal appearance. She is well-developed.   HENT:      Head: Normocephalic and atraumatic.      Comments: Chronic migraine headaches      Nose: Nose " normal.      Mouth/Throat:      Mouth: Mucous membranes are moist.   Eyes:      Conjunctiva/sclera: Conjunctivae normal.   Cardiovascular:      Rate and Rhythm: Normal rate and regular rhythm.      Pulses: Normal pulses.      Heart sounds: Normal heart sounds. No murmur heard.  Pulmonary:      Effort: Pulmonary effort is normal. No respiratory distress.      Breath sounds: Normal breath sounds.      Comments: Shortness of breath related to asthma   Abdominal:      General: Bowel sounds are normal.      Palpations: Abdomen is soft.      Tenderness: There is no abdominal tenderness.   Musculoskeletal:      Cervical back: Neck supple.      Right lower leg: No edema.      Left lower leg: No edema.   Skin:     General: Skin is warm and dry.   Neurological:      Mental Status: She is alert and oriented to person, place, and time.   Psychiatric:         Attention and Perception: Attention normal.         Mood and Affect: Mood is anxious.         Speech: Speech normal.         Behavior: Behavior normal. Behavior is cooperative.         Thought Content: Thought content normal.         Cognition and Memory: Cognition normal.         Judgment: Judgment normal.       Administrative Statements   I have spent a total time of 40 minutes in caring for this patient on the day of the visit/encounter including Diagnostic results, Prognosis, Risks and benefits of tx options, Instructions for management, Patient and family education, Importance of tx compliance, Risk factor reductions, Impressions, Counseling / Coordination of care, Documenting in the medical record, Reviewing / ordering tests, medicine, procedures  , and Obtaining or reviewing history  . Topics discussed with the patient / family include symptom assessment and management, medication review, medication adjustment, psychosocial support, and supportive listening.

## 2025-01-15 NOTE — PATIENT INSTRUCTIONS
_________________________________________________________________  YOU ARE DUE FOR YOUR ANNUAL PHYSICAL EXAM AFTER 6/18/2025.  REPEAT LABS ORDERED, PLEASE HAVE THEM DRAWN THE WEEK PRIOR TO YOUR VISIT (APPROXIMATELY 6/11/2025).  LABS HAVE BEEN ORDERED FOR YOU.   THESE LABS SHOULD BE DRAWN PRIOR TO YOUR NEXT VISIT.  YOU CAN HAVE THEM DRAWN UP TO 1 WEEK PRIOR TO YOUR NEXT VISIT.  HAVING YOUR BLOOD WORK WHEN YOU COME TO YOUR NEXT VISIT WILL ALLOW ME TO PROVIDE THE BEST MEDICAL CARE FOR YOU WITHOUT HAVING EITHER OF US PERFORM MORE WORK THAN NECESSARY.  PLEASE BE COMPLIANT WITH THIS REQUEST.   _____________________________________________________________________

## 2025-01-17 ENCOUNTER — TELEPHONE (OUTPATIENT)
Dept: INTERNAL MEDICINE CLINIC | Facility: CLINIC | Age: 25
End: 2025-01-17

## 2025-01-17 NOTE — TELEPHONE ENCOUNTER
Fax received in regards to patient - stating they are waiting for med to be prior authed - Ubrelvy 100 mg tablets. Key is [id number].

## 2025-02-14 PROBLEM — Z12.4 SCREENING FOR CERVICAL CANCER: Status: RESOLVED | Noted: 2025-01-15 | Resolved: 2025-02-14

## 2025-02-24 DIAGNOSIS — J45.40 MODERATE PERSISTENT ASTHMA WITHOUT COMPLICATION: ICD-10-CM

## 2025-03-18 RX ORDER — ALBUTEROL SULFATE 90 UG/1
2 INHALANT RESPIRATORY (INHALATION) EVERY 6 HOURS PRN
Qty: 8.5 G | Refills: 0 | Status: SHIPPED | OUTPATIENT
Start: 2025-03-18

## 2025-04-09 ENCOUNTER — ANNUAL EXAM (OUTPATIENT)
Dept: OBGYN CLINIC | Facility: CLINIC | Age: 25
End: 2025-04-09
Payer: COMMERCIAL

## 2025-04-09 VITALS
BODY MASS INDEX: 34.65 KG/M2 | DIASTOLIC BLOOD PRESSURE: 10 MMHG | WEIGHT: 242 LBS | SYSTOLIC BLOOD PRESSURE: 110 MMHG | HEIGHT: 70 IN

## 2025-04-09 DIAGNOSIS — Z12.4 PAP SMEAR FOR CERVICAL CANCER SCREENING: Primary | ICD-10-CM

## 2025-04-09 DIAGNOSIS — Z12.4 SCREENING FOR CERVICAL CANCER: ICD-10-CM

## 2025-04-09 PROCEDURE — S0612 ANNUAL GYNECOLOGICAL EXAMINA: HCPCS | Performed by: OBSTETRICS & GYNECOLOGY

## 2025-04-09 PROCEDURE — G0145 SCR C/V CYTO,THINLAYER,RESCR: HCPCS | Performed by: OBSTETRICS & GYNECOLOGY

## 2025-04-09 NOTE — PROGRESS NOTES
"Name: Denisse Navarro      : 2000      MRN: [id number]  Encounter Provider: Suze Cramer MD  Encounter Date: 2025   Encounter department: [facility]  :  Assessment & Plan  Pap smear for cervical cancer screening    Orders:  •  Liquid-based pap, screening; Future  STI testing discussed  - declined   Has Gardasil vaccine   Discussed  safe sex practices   Feels safe at home     Screening for cervical cancer    Orders:  •  Ambulatory referral to Obstetrics / Gynecology        History of Present Illness   HPI  Denisse Navarro is a 24 y.o. female who presents for annual GYN exam no complains    History obtained from: patient    Review of Systems   Constitutional: Negative.    HENT: Negative.     Respiratory: Negative.     Cardiovascular: Negative.    Genitourinary: Negative.    Musculoskeletal: Negative.    Skin: Negative.    Neurological: Negative.    Psychiatric/Behavioral: Negative.          Objective   BP (!) 110/10   Ht 5' 10\" (1.778 m)   Wt 110 kg (242 lb)   LMP 2025 (Approximate)   BMI 34.72 kg/m²      Physical Exam  Constitutional:       Appearance: Normal appearance.   HENT:      Head: Normocephalic and atraumatic.      Nose: Nose normal.   Eyes:      Conjunctiva/sclera: Conjunctivae normal.   Pulmonary:      Effort: Pulmonary effort is normal.   Chest:   Breasts:     Right: Normal. No swelling, bleeding, inverted nipple, mass, nipple discharge, skin change or tenderness.      Left: Normal. No swelling, bleeding, inverted nipple, mass, nipple discharge, skin change or tenderness.   Abdominal:      General: There is no distension.      Palpations: Abdomen is soft. There is no mass.      Tenderness: There is no abdominal tenderness. There is no guarding or rebound.      Hernia: No hernia is present.   Genitourinary:     General: Normal vulva.      Vagina: Normal.      Cervix: Normal.      Uterus: Normal.       Adnexa: Right adnexa normal and left " adnexa normal.   Neurological:      General: No focal deficit present.      Mental Status: She is alert and oriented to person, place, and time.   Psychiatric:         Mood and Affect: Mood normal.         Behavior: Behavior normal.

## 2025-04-14 LAB
LAB AP GYN PRIMARY INTERPRETATION: NORMAL
Lab: NORMAL

## 2025-04-15 ENCOUNTER — RESULTS FOLLOW-UP (OUTPATIENT)
Dept: OBGYN CLINIC | Facility: MEDICAL CENTER | Age: 25
End: 2025-04-15

## 2025-04-29 ENCOUNTER — RESULTS FOLLOW-UP (OUTPATIENT)
Dept: URGENT CARE | Facility: CLINIC | Age: 25
End: 2025-04-29

## 2025-04-29 ENCOUNTER — OFFICE VISIT (OUTPATIENT)
Dept: URGENT CARE | Facility: CLINIC | Age: 25
End: 2025-04-29
Payer: COMMERCIAL

## 2025-04-29 ENCOUNTER — APPOINTMENT (OUTPATIENT)
Dept: RADIOLOGY | Facility: CLINIC | Age: 25
End: 2025-04-29
Payer: COMMERCIAL

## 2025-04-29 VITALS
WEIGHT: 233 LBS | SYSTOLIC BLOOD PRESSURE: 133 MMHG | HEART RATE: 77 BPM | RESPIRATION RATE: 18 BRPM | HEIGHT: 70 IN | BODY MASS INDEX: 33.36 KG/M2 | DIASTOLIC BLOOD PRESSURE: 75 MMHG | TEMPERATURE: 98.4 F | OXYGEN SATURATION: 97 %

## 2025-04-29 DIAGNOSIS — S67.192A CRUSHING INJURY OF RIGHT MIDDLE FINGER, INITIAL ENCOUNTER: ICD-10-CM

## 2025-04-29 DIAGNOSIS — S67.192A CRUSHING INJURY OF RIGHT MIDDLE FINGER, INITIAL ENCOUNTER: Primary | ICD-10-CM

## 2025-04-29 PROCEDURE — 99213 OFFICE O/P EST LOW 20 MIN: CPT

## 2025-04-29 PROCEDURE — 73140 X-RAY EXAM OF FINGER(S): CPT

## 2025-04-29 NOTE — PROGRESS NOTES
[facility]        NAME: Denisse Navarro is a 24 y.o. female  : 2000    MRN: [id number]  DATE: 2025  TIME: 12:29 PM    Assessment and Plan   Crushing injury of right middle finger, initial encounter [S67.192A]  1. Crushing injury of right middle finger, initial encounter  XR finger right third digit-middle        X-ray initial read: negative    Splint applied for comfort. RICE recommended.    Patient Instructions   Wear splint for comfort.  Apply ice  Rest, elevate  Tylenol and Motrin for pain    Follow up with PCP in 3-5 days.  Proceed to  ER if symptoms worsen.    If tests have been performed at [facility], our office will contact you with results if changes need to be made to the care plan discussed with you at the visit.  You can review your full results on [facility] MyChart.    Chief Complaint     Chief Complaint   Patient presents with    Finger Injury     Happened last night around 7 pa, jammed between 2 pieces of metal  Right middle finger top joint  Patient is a  and uses her fingers all day         History of Present Illness       24-year-old female presents with right middle finger pain and swelling after an injury last night.  She reports she was in her basement and the distal part of her right middle finger was crushed between 2 metal poles.  She had pain and swelling to the area since then.  She is able to bend her finger but it is painful.  She has tried ice and ibuprofen with minimal relief.  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  Denies numbness or prior injuries to the area.        Review of Systems   Review of Systems   Musculoskeletal:  Positive for joint swelling.   Neurological:  Negative for weakness and numbness.   All other systems reviewed and are negative.        Current Medications       Current Outpatient Medications:     albuterol (2.5 mg/3 mL) 0.083 % nebulizer solution, Take 3 mL  (2.5 mg total) by nebulization every 6 (six) hours as needed for wheezing or shortness of breath, Disp: 360 mL, Rfl: 1    albuterol (PROVENTIL HFA,VENTOLIN HFA) 90 mcg/act inhaler, INHALE 2 PUFFS EVERY 6 (SIX) HOURS AS NEEDED FOR WHEEZING, Disp: 8.5 g, Rfl: 0    Albuterol-Budesonide (Airsupra) 90-80 MCG/ACT AERO, Inhale 2 Inhalations in the morning, Disp: 10.7 g, Rfl: 6    ALPRAZolam (XANAX) 0.5 mg tablet, Take 1 tablet (0.5 mg total) by mouth 2 (two) times a day as needed for anxiety, Disp: 60 tablet, Rfl: 0    ascorbic acid (VITAMIN C) 500 MG tablet, Take 500 mg by mouth, Disp: , Rfl:     Cholecalciferol (Vitamin D) 50 MCG (2000 UT) tablet, Take 2 tablets (4,000 Units total) by mouth daily, Disp: 180 tablet, Rfl: 3    fluticasone (FLONASE) 50 mcg/act nasal spray, 1 spray into each nostril daily, Disp: 15.8 mL, Rfl: 5    Multiple Vitamin (Multi-Day) TABS, Take 1 tablet by mouth, Disp: , Rfl:     ondansetron (ZOFRAN) 4 mg tablet, Take 1 tablet (4 mg total) by mouth every 6 (six) hours, Disp: 20 tablet, Rfl: 0    potassium citrate (UROCIT-K) 10 mEq, Take 1 tablet (10 mEq total) by mouth 3 (three) times a day with meals, Disp: 90 tablet, Rfl: 5    Probiotic Product (Misc Intestinal Vikki Regulat) CAPS, Take by mouth, Disp: , Rfl:     sodium chloride (OCEAN) 0.65 % nasal spray, 1 spray into each nostril as needed, Disp: , Rfl:     Ubrogepant (UBRELVY) 100 MG tablet, Take 1 tablet (100 mg) one time as needed for migraine. May repeat one additional tablet (100 mg) at least two hours after the first dose. Do not use more than two doses per day, or for more than eight days per month., Disp: 10 tablet, Rfl: 0    Atogepant 10 MG TABS, Take 10 mg by mouth in the morning (Patient not taking: Reported on 4/29/2025), Disp: 30 tablet, Rfl: 11    Budeson-Glycopyrrol-Formoterol (Breztri Aerosphere) 160-9-4.8 MCG/ACT AERO, Inhale 2 puffs 2 (two) times a day Rinse mouth after use. (Patient not taking: Reported on 4/29/2025), Disp:  "10.7 g, Rfl: 6    DULoxetine (CYMBALTA) 20 mg capsule, Take 1 capsule (20 mg total) by mouth daily (Patient not taking: Reported on 4/29/2025), Disp: 90 capsule, Rfl: 3    Fluticasone-Salmeterol (Advair Diskus) 250-50 mcg/dose inhaler, Inhale 1 puff 2 (two) times a day Rinse mouth after use. (Patient not taking: Reported on 4/29/2025), Disp: 180 blister, Rfl: 1    Current Allergies     Allergies as of 04/29/2025    (No Known Allergies)            The following portions of the patient's history were reviewed and updated as appropriate: allergies, current medications, past family history, past medical history, past social history, past surgical history and problem list.     Past Medical History:   Diagnosis Date    Allergic     Anxiety     Asthma     Exercise-induced asthma     GERD (gastroesophageal reflux disease)     Inflammatory bowel disease     Kidney stone     Migraine     Ovarian cyst        Past Surgical History:   Procedure Laterality Date    APPENDECTOMY      [place] CYSTO/URETERO W/LITHOTRIPSY &INDWELL STENT INSRT Right 5/29/2023    Procedure: CYSTOSCOPY URETEROSCOPY WITH LITHOTRIPSY HOLMIUM LASER,  INSERTION STENT URETERAL;  Surgeon: Deshawn Mohr MD;  Location: [facility];  Service: Urology    UPPER GASTROINTESTINAL ENDOSCOPY      WISDOM TOOTH EXTRACTION      WISDOM TOOTH EXTRACTION         Family History   Problem Relation Age of Onset    No Known Problems Father     No Known Problems Mother     Heart disease Maternal Grandfather     Arthritis Maternal Grandmother          Medications have been verified.        Objective   /75   Pulse 77   Temp 98.4 °F (36.9 °C)   Resp 18   Ht 5' 10\" (1.778 m)   Wt 106 kg (233 lb)   LMP 04/15/2025 (Approximate)   SpO2 97%   BMI 33.43 kg/m²   Patient's last menstrual period was 04/15/2025 (approximate).       Physical Exam     Physical Exam  Vitals and nursing note reviewed.   Constitutional:       General: She is not in acute distress.     Appearance: Normal " appearance. She is not ill-appearing.   HENT:      Head: Normocephalic and atraumatic.      Right Ear: External ear normal.      Left Ear: External ear normal.      Nose: Nose normal.   Eyes:      Conjunctiva/sclera: Conjunctivae normal.   Pulmonary:      Effort: Pulmonary effort is normal.   Musculoskeletal:      Right hand: Swelling and tenderness present. No deformity. Normal range of motion. Normal strength. Normal sensation. Normal capillary refill. Normal pulse.      Left hand: Normal.      Comments: Mild swelling over the DIP joint of the right middle finger.  Tenderness to palpation over the same area.  Sensation and strength at DIP and PIP joints are intact.  Cap refill less than 2 seconds.   Skin:     General: Skin is warm and dry.      Capillary Refill: Capillary refill takes less than 2 seconds.   Neurological:      General: No focal deficit present.      Mental Status: She is alert.   Psychiatric:         Mood and Affect: Mood normal.         Behavior: Behavior normal.

## 2025-05-05 DIAGNOSIS — J45.40 MODERATE PERSISTENT ASTHMA WITHOUT COMPLICATION: ICD-10-CM

## 2025-05-05 RX ORDER — ALBUTEROL SULFATE 90 UG/1
2 INHALANT RESPIRATORY (INHALATION) EVERY 6 HOURS PRN
Qty: 8.5 G | Refills: 0 | Status: SHIPPED | OUTPATIENT
Start: 2025-05-05

## 2025-05-05 NOTE — TELEPHONE ENCOUNTER
Refill must be reviewed and completed by the office or provider. The refill is unable to be approved or denied by the medication management team.    Albuterol as needed - Please review to see if the refill is appropriate.

## 2025-06-15 ENCOUNTER — OFFICE VISIT (OUTPATIENT)
Dept: URGENT CARE | Facility: CLINIC | Age: 25
End: 2025-06-15
Payer: COMMERCIAL

## 2025-06-15 ENCOUNTER — APPOINTMENT (OUTPATIENT)
Dept: RADIOLOGY | Facility: CLINIC | Age: 25
End: 2025-06-15
Attending: PHYSICIAN ASSISTANT
Payer: COMMERCIAL

## 2025-06-15 VITALS
TEMPERATURE: 98 F | HEIGHT: 70 IN | WEIGHT: 230 LBS | BODY MASS INDEX: 32.93 KG/M2 | DIASTOLIC BLOOD PRESSURE: 58 MMHG | SYSTOLIC BLOOD PRESSURE: 116 MMHG | OXYGEN SATURATION: 99 % | RESPIRATION RATE: 17 BRPM | HEART RATE: 86 BPM

## 2025-06-15 DIAGNOSIS — N30.01 ACUTE CYSTITIS WITH HEMATURIA: ICD-10-CM

## 2025-06-15 DIAGNOSIS — R39.9 UTI SYMPTOMS: ICD-10-CM

## 2025-06-15 DIAGNOSIS — S69.92XA INJURY OF LEFT HAND, INITIAL ENCOUNTER: Primary | ICD-10-CM

## 2025-06-15 LAB
SL AMB  POCT GLUCOSE, UA: NEGATIVE
SL AMB LEUKOCYTE ESTERASE,UA: ABNORMAL
SL AMB POCT BILIRUBIN,UA: NEGATIVE
SL AMB POCT BLOOD,UA: ABNORMAL
SL AMB POCT CLARITY,UA: CLEAR
SL AMB POCT COLOR,UA: YELLOW
SL AMB POCT KETONES,UA: NEGATIVE
SL AMB POCT NITRITE,UA: NEGATIVE
SL AMB POCT PH,UA: 7
SL AMB POCT SPECIFIC GRAVITY,UA: 1.01
SL AMB POCT URINE PROTEIN: NEGATIVE
SL AMB POCT UROBILINOGEN: 0.2

## 2025-06-15 PROCEDURE — 87147 CULTURE TYPE IMMUNOLOGIC: CPT | Performed by: PHYSICIAN ASSISTANT

## 2025-06-15 PROCEDURE — 73130 X-RAY EXAM OF HAND: CPT

## 2025-06-15 PROCEDURE — 81002 URINALYSIS NONAUTO W/O SCOPE: CPT | Performed by: PHYSICIAN ASSISTANT

## 2025-06-15 PROCEDURE — 99214 OFFICE O/P EST MOD 30 MIN: CPT | Performed by: PHYSICIAN ASSISTANT

## 2025-06-15 PROCEDURE — 87086 URINE CULTURE/COLONY COUNT: CPT | Performed by: PHYSICIAN ASSISTANT

## 2025-06-15 RX ORDER — NITROFURANTOIN 25; 75 MG/1; MG/1
100 CAPSULE ORAL 2 TIMES DAILY
Qty: 14 CAPSULE | Refills: 0 | Status: SHIPPED | OUTPATIENT
Start: 2025-06-15 | End: 2025-06-22

## 2025-06-15 NOTE — PROGRESS NOTES
[facility]        NAME: Denisse Navarro is a 24 y.o. female  : 2000    MRN: [id number]  DATE: Sena 15, 2025  TIME: 3:49 PM    Assessment and Plan   Injury of left hand, initial encounter [S69.92XA]  1. Injury of left hand, initial encounter  XR hand 3+ vw left    Diclofenac Sodium (VOLTAREN) 1 %      2. UTI symptoms  POCT urine dip    Urine culture    Urine culture      3. Acute cystitis with hematuria  Urine culture    Urine culture    nitrofurantoin (MACROBID) 100 mg capsule        Xray of left hand  MARIA DE JESUS initial interpretation negative for acute fractures.    Patient Instructions     Take Nitrofurantoin as prescribed  Drink plenty of water   Cranberry supplements  Urinate within 5 minutes following intercourse  Follow up with OB/GYN    Tylenol/Ibuprofen for pain  Apply gel on the affected area on the skin  Ice 20 minutes 3-4 times per day for 3 days  Insulate the skin from the ice to prevent frostbite  Rest and Elevate  Follow up with orthopedic if symptoms do not improve   Follow up with PCP in 3-5 days.  Proceed to  ER if symptoms worsen.    If tests have been performed at [facility], our office will contact you with results if changes need to be made to the care plan discussed with you at the visit.  [facility]    Chief Complaint     Chief Complaint   Patient presents with    Finger Injury     Thumb on left hand.  Happened today.  Got it caught in the pull down attic steps/door.   Swelling, bruising, painful. Hard to move finger. Throbbing pain and when trying to bend it, it's a sharp pain.     Urinary Tract Infection     Started yesterday.  Lower RQ back pain, nausea, bladder pressure, frequency.          History of Present Illness       Injury  The incident occurred 6 to 12 hours ago. The incident occurred at home. Injury mechanism: Got it caught in the pull down attic steps/door. There is an injury to the Left thumb. The pain is moderate.  Pertinent negatives include no abdominal pain, chest pain, coughing, headaches, light-headedness, nausea, vomiting or weakness. There have been no prior injuries to these areas. Her tetanus status is unknown.   Urinary Tract Infection   This is a new problem. The current episode started in the past 7 days. The problem occurs intermittently. The problem has been gradually worsening. The quality of the pain is described as burning. The pain is at a severity of 4/10. The pain is moderate. There has been no fever. She is Sexually active. There is No history of pyelonephritis. Associated symptoms include flank pain, hesitancy and urgency. Pertinent negatives include no chills, nausea or vomiting. She has tried acetaminophen for the symptoms. The treatment provided mild relief. Her past medical history is significant for recurrent UTIs. There is no history of kidney stones, a single kidney, urinary stasis or a urological procedure.       Review of Systems   Review of Systems   Constitutional:  Negative for activity change, chills and fever.   HENT:  Negative for facial swelling and trouble swallowing.    Respiratory:  Negative for apnea, cough, chest tightness, shortness of breath and wheezing.    Cardiovascular:  Negative for chest pain, palpitations and leg swelling.   Gastrointestinal:  Negative for abdominal pain, nausea and vomiting.   Genitourinary:  Positive for flank pain, hesitancy and urgency.   Skin:  Positive for wound. Negative for rash.   Allergic/Immunologic: Negative for environmental allergies, food allergies and immunocompromised state.   Neurological:  Negative for dizziness, weakness, light-headedness and headaches.   Psychiatric/Behavioral:  Negative for confusion. The patient is not nervous/anxious.          Current Medications     Current Medications[1]    Current Allergies     Allergies as of 06/15/2025 - Reviewed 06/15/2025   Allergen Reaction Noted    Pollen extract Nasal Congestion 03/03/2025  "           The following portions of the patient's history were reviewed and updated as appropriate: allergies, current medications, past family history, past medical history, past social history, past surgical history and problem list.     Past Medical History[2]    Past Surgical History[3]    Family History[4]      Medications have been verified.        Objective   /58   Pulse 86   Temp 98 °F (36.7 °C)   Resp 17   Ht 5' 10\" (1.778 m)   Wt 104 kg (230 lb)   LMP 06/15/2025 (Exact Date)   SpO2 99%   BMI 33.00 kg/m²   Patient's last menstrual period was 06/15/2025 (exact date).       Physical Exam     Physical Exam  Vitals and nursing note reviewed.   Constitutional:       General: She is not in acute distress.     Appearance: She is well-developed. She is not diaphoretic.   HENT:      Head: Normocephalic and atraumatic.     Cardiovascular:      Rate and Rhythm: Normal rate and regular rhythm.      Heart sounds: Normal heart sounds. No murmur heard.     No friction rub. No gallop.   Pulmonary:      Effort: Pulmonary effort is normal. No respiratory distress.      Breath sounds: Normal breath sounds. No wheezing or rales.   Chest:      Chest wall: No tenderness.   Abdominal:      General: Abdomen is flat.      Tenderness: There is no abdominal tenderness. There is right CVA tenderness. There is no left CVA tenderness, guarding or rebound.     Musculoskeletal:         General: No deformity.      Left hand: Tenderness present. Decreased range of motion. Normal strength. Normal sensation. Normal capillary refill. Normal pulse.     Skin:     General: Skin is warm and dry.      Coloration: Skin is not pale.      Findings: No erythema or rash.     Neurological:      Mental Status: She is alert and oriented to person, place, and time.      Motor: No abnormal muscle tone.      Deep Tendon Reflexes: Reflexes are normal and symmetric. Reflexes normal.                          [1]   Current Outpatient Medications: "     albuterol (2.5 mg/3 mL) 0.083 % nebulizer solution, Take 3 mL (2.5 mg total) by nebulization every 6 (six) hours as needed for wheezing or shortness of breath, Disp: 360 mL, Rfl: 1    albuterol (PROVENTIL HFA,VENTOLIN HFA) 90 mcg/act inhaler, INHALE 2 PUFFS BY MOUTH EVERY 6 HOURS AS NEEDED FOR WHEEZING, Disp: 8.5 g, Rfl: 0    Albuterol-Budesonide (Airsupra) 90-80 MCG/ACT AERO, Inhale 2 Inhalations in the morning, Disp: 10.7 g, Rfl: 6    ALPRAZolam (XANAX) 0.5 mg tablet, Take 1 tablet (0.5 mg total) by mouth 2 (two) times a day as needed for anxiety, Disp: 60 tablet, Rfl: 0    ascorbic acid (VITAMIN C) 500 MG tablet, Take 500 mg by mouth, Disp: , Rfl:     Cholecalciferol (Vitamin D) 50 MCG (2000 UT) tablet, Take 2 tablets (4,000 Units total) by mouth daily, Disp: 180 tablet, Rfl: 3    Diclofenac Sodium (VOLTAREN) 1 %, Apply 2 g topically 4 (four) times a day, Disp: 20 g, Rfl: 0    fluticasone (FLONASE) 50 mcg/act nasal spray, 1 spray into each nostril daily, Disp: 15.8 mL, Rfl: 5    nitrofurantoin (MACROBID) 100 mg capsule, Take 1 capsule (100 mg total) by mouth 2 (two) times a day for 7 days, Disp: 14 capsule, Rfl: 0    ondansetron (ZOFRAN) 4 mg tablet, Take 1 tablet (4 mg total) by mouth every 6 (six) hours, Disp: 20 tablet, Rfl: 0    potassium citrate (UROCIT-K) 10 mEq, Take 1 tablet (10 mEq total) by mouth 3 (three) times a day with meals, Disp: 90 tablet, Rfl: 5    Probiotic Product (Misc Intestinal Vikki Regulat) CAPS, Take by mouth, Disp: , Rfl:     sodium chloride (OCEAN) 0.65 % nasal spray, 1 spray into each nostril as needed, Disp: , Rfl:     Ubrogepant (UBRELVY) 100 MG tablet, Take 1 tablet (100 mg) one time as needed for migraine. May repeat one additional tablet (100 mg) at least two hours after the first dose. Do not use more than two doses per day, or for more than eight days per month., Disp: 10 tablet, Rfl: 0    Atogepant 10 MG TABS, Take 10 mg by mouth in the morning (Patient not taking:  Reported on 6/15/2025), Disp: 30 tablet, Rfl: 11    Budeson-Glycopyrrol-Formoterol (Breztri Aerosphere) 160-9-4.8 MCG/ACT AERO, Inhale 2 puffs 2 (two) times a day Rinse mouth after use. (Patient not taking: Reported on 6/15/2025), Disp: 10.7 g, Rfl: 6    DULoxetine (CYMBALTA) 20 mg capsule, Take 1 capsule (20 mg total) by mouth daily (Patient not taking: Reported on 6/15/2025), Disp: 90 capsule, Rfl: 3    Fluticasone-Salmeterol (Advair Diskus) 250-50 mcg/dose inhaler, Inhale 1 puff 2 (two) times a day Rinse mouth after use. (Patient not taking: Reported on 6/15/2025), Disp: 180 blister, Rfl: 1    Multiple Vitamin (Multi-Day) TABS, Take 1 tablet by mouth, Disp: , Rfl:   [2]   Past Medical History:  Diagnosis Date    Allergic     Anxiety     Asthma     Exercise-induced asthma     GERD (gastroesophageal reflux disease)     Inflammatory bowel disease     Kidney stone     Migraine     Ovarian cyst    [3]   Past Surgical History:  Procedure Laterality Date    APPENDECTOMY      [place] CYSTO/URETERO W/LITHOTRIPSY &INDWELL STENT INSRT Right 5/29/2023    Procedure: CYSTOSCOPY URETEROSCOPY WITH LITHOTRIPSY HOLMIUM LASER,  INSERTION STENT URETERAL;  Surgeon: Deshawn Mohr MD;  Location: [facility];  Service: Urology    UPPER GASTROINTESTINAL ENDOSCOPY      WISDOM TOOTH EXTRACTION      WISDOM TOOTH EXTRACTION     [4]   Family History  Problem Relation Name Age of Onset    No Known Problems Father      No Known Problems Mother      Heart disease Maternal Grandfather -     Arthritis Maternal Grandmother -

## 2025-06-15 NOTE — PATIENT INSTRUCTIONS
Take Nitrofurantoin as prescribed  Drink plenty of water   Cranberry supplements  Urinate within 5 minutes following intercourse  Follow up with OB/GYN    Tylenol/Ibuprofen for pain  Apply gel on the affected area on the skin  Ice 20 minutes 3-4 times per day for 3 days  Insulate the skin from the ice to prevent frostbite  Rest and Elevate  Follow up with orthopedic if symptoms do not improve   Follow up with PCP in 3-5 days.  Proceed to  ER if symptoms worsen.    If tests have been performed at [facility], our office will contact you with results if changes need to be made to the care plan discussed with you at the visit.  You can review your full results on [facility] MyChart.

## 2025-06-16 ENCOUNTER — RESULTS FOLLOW-UP (OUTPATIENT)
Dept: URGENT CARE | Facility: CLINIC | Age: 25
End: 2025-06-16

## 2025-06-16 LAB — BACTERIA UR CULT: ABNORMAL

## 2025-06-17 ENCOUNTER — TELEPHONE (OUTPATIENT)
Dept: URGENT CARE | Facility: CLINIC | Age: 25
End: 2025-06-17

## 2025-06-17 DIAGNOSIS — J45.40 MODERATE PERSISTENT ASTHMA WITHOUT COMPLICATION: ICD-10-CM

## 2025-06-17 NOTE — TELEPHONE ENCOUNTER
Contacted the patient and relayed the results of her urine culture.  Ask her to  her antibiotic which was sent to pharmacy yesterday.  Patient verbalized understanding.

## 2025-06-18 DIAGNOSIS — J45.40 MODERATE PERSISTENT ASTHMA WITHOUT COMPLICATION: ICD-10-CM

## 2025-06-19 RX ORDER — ALBUTEROL SULFATE 90 UG/1
2 INHALANT RESPIRATORY (INHALATION) EVERY 6 HOURS PRN
Qty: 8.5 G | Refills: 0 | Status: SHIPPED | OUTPATIENT
Start: 2025-06-19

## 2025-06-19 NOTE — TELEPHONE ENCOUNTER
Requested medication(s) are due for refill today: Yes  Patient has already received a courtesy refill: No  Other reason request has been forwarded to provider: please assist

## 2025-07-14 ENCOUNTER — APPOINTMENT (OUTPATIENT)
Dept: LAB | Facility: CLINIC | Age: 25
End: 2025-07-14
Attending: NURSE PRACTITIONER
Payer: COMMERCIAL

## 2025-07-14 DIAGNOSIS — Z00.00 ANNUAL PHYSICAL EXAM: ICD-10-CM

## 2025-07-14 DIAGNOSIS — E83.42 HYPOMAGNESEMIA: ICD-10-CM

## 2025-07-14 DIAGNOSIS — Z13.1 SCREENING FOR DIABETES MELLITUS: Chronic | ICD-10-CM

## 2025-07-14 DIAGNOSIS — Z13.220 SCREENING FOR HYPERLIPIDEMIA: ICD-10-CM

## 2025-07-14 DIAGNOSIS — E55.9 VITAMIN D DEFICIENCY: ICD-10-CM

## 2025-07-14 LAB
25(OH)D3 SERPL-MCNC: 18.8 NG/ML (ref 30–100)
ALBUMIN SERPL BCG-MCNC: 4.1 G/DL (ref 3.5–5)
ALP SERPL-CCNC: 82 U/L (ref 34–104)
ALT SERPL W P-5'-P-CCNC: 7 U/L (ref 7–52)
ANION GAP SERPL CALCULATED.3IONS-SCNC: 7 MMOL/L (ref 4–13)
AST SERPL W P-5'-P-CCNC: 15 U/L (ref 13–39)
BASOPHILS # BLD AUTO: 0.08 THOUSANDS/ÂΜL (ref 0–0.1)
BASOPHILS NFR BLD AUTO: 1 % (ref 0–1)
BILIRUB SERPL-MCNC: 0.34 MG/DL (ref 0.2–1)
BUN SERPL-MCNC: 10 MG/DL (ref 5–25)
CALCIUM SERPL-MCNC: 9.2 MG/DL (ref 8.4–10.2)
CHLORIDE SERPL-SCNC: 106 MMOL/L (ref 96–108)
CHOLEST SERPL-MCNC: 113 MG/DL (ref ?–200)
CO2 SERPL-SCNC: 26 MMOL/L (ref 21–32)
CREAT SERPL-MCNC: 0.54 MG/DL (ref 0.6–1.3)
EOSINOPHIL # BLD AUTO: 0.38 THOUSAND/ÂΜL (ref 0–0.61)
EOSINOPHIL NFR BLD AUTO: 5 % (ref 0–6)
ERYTHROCYTE [DISTWIDTH] IN BLOOD BY AUTOMATED COUNT: 12.9 % (ref 11.6–15.1)
EST. AVERAGE GLUCOSE BLD GHB EST-MCNC: 100 MG/DL
GFR SERPL CREATININE-BSD FRML MDRD: 134 ML/MIN/1.73SQ M
GLUCOSE P FAST SERPL-MCNC: 90 MG/DL (ref 65–99)
HBA1C MFR BLD: 5.1 %
HCT VFR BLD AUTO: 40.5 % (ref 34.8–46.1)
HDLC SERPL-MCNC: 45 MG/DL
HGB BLD-MCNC: 13 G/DL (ref 11.5–15.4)
IMM GRANULOCYTES # BLD AUTO: 0.03 THOUSAND/UL (ref 0–0.2)
IMM GRANULOCYTES NFR BLD AUTO: 0 % (ref 0–2)
LDLC SERPL CALC-MCNC: 50 MG/DL (ref 0–100)
LYMPHOCYTES # BLD AUTO: 1.51 THOUSANDS/ÂΜL (ref 0.6–4.47)
LYMPHOCYTES NFR BLD AUTO: 19 % (ref 14–44)
MAGNESIUM SERPL-MCNC: 2 MG/DL (ref 1.9–2.7)
MCH RBC QN AUTO: 28.4 PG (ref 26.8–34.3)
MCHC RBC AUTO-ENTMCNC: 32.1 G/DL (ref 31.4–37.4)
MCV RBC AUTO: 88 FL (ref 82–98)
MONOCYTES # BLD AUTO: 0.59 THOUSAND/ÂΜL (ref 0.17–1.22)
MONOCYTES NFR BLD AUTO: 7 % (ref 4–12)
NEUTROPHILS # BLD AUTO: 5.42 THOUSANDS/ÂΜL (ref 1.85–7.62)
NEUTS SEG NFR BLD AUTO: 68 % (ref 43–75)
NRBC BLD AUTO-RTO: 0 /100 WBCS
PLATELET # BLD AUTO: 423 THOUSANDS/UL (ref 149–390)
PMV BLD AUTO: 11 FL (ref 8.9–12.7)
POTASSIUM SERPL-SCNC: 4.2 MMOL/L (ref 3.5–5.3)
PROT SERPL-MCNC: 7.4 G/DL (ref 6.4–8.4)
RBC # BLD AUTO: 4.58 MILLION/UL (ref 3.81–5.12)
SODIUM SERPL-SCNC: 139 MMOL/L (ref 135–147)
TRIGL SERPL-MCNC: 88 MG/DL (ref ?–150)
WBC # BLD AUTO: 8.01 THOUSAND/UL (ref 4.31–10.16)

## 2025-07-14 PROCEDURE — 80061 LIPID PANEL: CPT

## 2025-07-14 PROCEDURE — 83036 HEMOGLOBIN GLYCOSYLATED A1C: CPT

## 2025-07-14 PROCEDURE — 36415 COLL VENOUS BLD VENIPUNCTURE: CPT

## 2025-07-14 PROCEDURE — 85025 COMPLETE CBC W/AUTO DIFF WBC: CPT

## 2025-07-14 PROCEDURE — 83735 ASSAY OF MAGNESIUM: CPT

## 2025-07-14 PROCEDURE — 82306 VITAMIN D 25 HYDROXY: CPT

## 2025-07-14 PROCEDURE — 80053 COMPREHEN METABOLIC PANEL: CPT

## 2025-07-16 PROBLEM — Z79.899 ON LONG TERM DRUG THERAPY: Status: ACTIVE | Noted: 2025-07-16

## 2025-07-16 PROBLEM — Z79.899 CHRONIC USE OF BENZODIAZEPINE FOR THERAPEUTIC PURPOSE: Status: ACTIVE | Noted: 2025-07-16

## 2025-07-16 PROBLEM — Z13.220 SCREENING FOR HYPERLIPIDEMIA: Chronic | Status: RESOLVED | Noted: 2024-06-17 | Resolved: 2025-07-16

## 2025-07-16 PROBLEM — Z51.81 ENCOUNTER FOR THERAPEUTIC DRUG LEVEL MONITORING: Status: ACTIVE | Noted: 2025-07-16

## 2025-07-16 PROBLEM — Z13.1 SCREENING FOR DIABETES MELLITUS: Chronic | Status: RESOLVED | Noted: 2024-06-17 | Resolved: 2025-07-16

## 2025-07-16 PROBLEM — E66.811 CLASS 1 OBESITY DUE TO EXCESS CALORIES WITH SERIOUS COMORBIDITY AND BODY MASS INDEX (BMI) OF 33.0 TO 33.9 IN ADULT: Chronic | Status: ACTIVE | Noted: 2022-10-26

## 2025-07-16 PROBLEM — E83.42 HYPOMAGNESEMIA: Status: RESOLVED | Noted: 2025-01-15 | Resolved: 2025-07-16

## 2025-07-16 PROBLEM — R94.6 ABNORMAL RESULTS OF THYROID FUNCTION STUDIES: Status: ACTIVE | Noted: 2025-07-16

## 2025-07-16 PROBLEM — R73.01 IMPAIRED FASTING GLUCOSE: Status: ACTIVE | Noted: 2025-07-16

## 2025-07-16 PROBLEM — E83.40 DISORDERS OF MAGNESIUM METABOLISM, UNSPECIFIED: Status: ACTIVE | Noted: 2025-07-16

## 2025-07-16 PROBLEM — E78.00 PURE HYPERCHOLESTEROLEMIA: Status: ACTIVE | Noted: 2025-07-16

## 2025-07-16 NOTE — ASSESSMENT & PLAN NOTE
Component  Ref Range & Units (hover) 7/14/25 11:15 AM   Vit D, 25-Hydroxy 18.8 Low      Vitamin D   Your Vitamin D level should be close to 100.  Helps with:  Decreasing Fatigue - Improves Energy  Fights Depression  Decreases Anxiety  Is a Neurotransporter Increasing Serotonin Levels  Improves Bone Health and Helps Prevent Osteoporosis  Is an Essential Nutrient  You Can ONLY Absorb the Proper Amount of Calcium When You Have the Correct Vitamin D Level  Decreases Wrinkles and Fine Lines by working with Collagen Production  Improves Immune Health  Muscle Strength  Brain Cells - To Decrease Brain Fog  Decreases Inflammation  Improved Glucose Metabolism - Helping With Weight Loss  Hair Follicle Health and Growth  Balances Melatonin Levels to Improve Sleep Patterns     START  Vitamin D2 50,000 units weekly.  Vitamin D3 5000 unit capsules daily.    Orders:    Vitamin D 25 hydroxy; Future    ergocalciferol (VITAMIN D2) 50,000 units; Take 1 capsule (50,000 Units total) by mouth once a week    Cholecalciferol (Vitamin D3) 125 MCG (5000 UT) CAPS; Take 1 capsule (5,000 Units total) by mouth in the morning

## 2025-07-16 NOTE — ASSESSMENT & PLAN NOTE
Orders:    buPROPion (WELLBUTRIN) 100 mg tablet; Take 1 tablet (100 mg total) by mouth 2 (two) times a day

## 2025-07-16 NOTE — PATIENT INSTRUCTIONS
"Patient Education     Routine physical for adults   The Basics   Written by the doctors and editors at [facility]   What is a physical? -- A physical is a routine visit, or \"check-up,\" with your doctor. You might also hear it called a \"wellness visit\" or \"preventive visit.\"  During each visit, the doctor will:   Ask about your physical and mental health   Ask about your habits, behaviors, and lifestyle   Do an exam   Give you vaccines if needed   Talk to you about any medicines you take   Give advice about your health   Answer your questions  Getting regular check-ups is an important part of taking care of your health. It can help your doctor find and treat any problems you have. But it's also important for preventing health problems.  A routine physical is different from a \"sick visit.\" A sick visit is when you see a doctor because of a health concern or problem. Since physicals are scheduled ahead of time, you can think about what you want to ask the doctor.  How often should I get a physical? -- It depends on your age and health. In general, for people age 21 years and older:   If you are younger than 50 years, you might be able to get a physical every 3 years.   If you are 50 years or older, your doctor might recommend a physical every year.  If you have an ongoing health condition, like diabetes or high blood pressure, your doctor will probably want to see you more often.  What happens during a physical? -- In general, each visit will include:   Physical exam - The doctor or nurse will check your height, weight, heart rate, and blood pressure. They will also look at your eyes and ears. They will ask about how you are feeling and whether you have any symptoms that bother you.   Medicines - It's a good idea to bring a list of all the medicines you take to each doctor visit. Your doctor will talk to you about your medicines and answer any questions. Tell them if you are having any side effects that bother you. You " "should also tell them if you are having trouble paying for any of your medicines.   Habits and behaviors - This includes:   Your diet   Your exercise habits   Whether you smoke, drink alcohol, or use drugs   Whether you are sexually active   Whether you feel safe at home  Your doctor will talk to you about things you can do to improve your health and lower your risk of health problems. They will also offer help and support. For example, if you want to quit smoking, they can give you advice and might prescribe medicines. If you want to improve your diet or get more physical activity, they can help you with this, too.   Lab tests, if needed - The tests you get will depend on your age and situation. For example, your doctor might want to check your:   Cholesterol   Blood sugar   Iron level   Vaccines - The recommended vaccines will depend on your age, health, and what vaccines you already had. Vaccines are very important because they can prevent certain serious or deadly infections.   Discussion of screening - \"Screening\" means checking for diseases or other health problems before they cause symptoms. Your doctor can recommend screening based on your age, risk, and preferences. This might include tests to check for:   Cancer, such as breast, prostate, cervical, ovarian, colorectal, prostate, lung, or skin cancer   Sexually transmitted infections, such as chlamydia and gonorrhea   Mental health conditions like depression and anxiety  Your doctor will talk to you about the different types of screening tests. They can help you decide which screenings to have. They can also explain what the results might mean.   Answering questions - The physical is a good time to ask the doctor or nurse questions about your health. If needed, they can refer you to other doctors or specialists, too.  Adults older than 65 years often need other care, too. As you get older, your doctor will talk to you about:   How to prevent falling at " home   Hearing or vision tests   Memory testing   How to take your medicines safely   Making sure that you have the help and support you need at home  All topics are updated as new evidence becomes available and our peer review process is complete.  This topic retrieved from [employer] on: May 02, 2024.  Topic 750537 Version 1.0  Release: 32.4.3 - C32.122  © 2024 UpToDate, Inc. and/or its affiliates. All rights reserved.  Consumer Information Use and Disclaimer   Disclaimer: This generalized information is a limited summary of diagnosis, treatment, and/or medication information. It is not meant to be comprehensive and should be used as a tool to help the user understand and/or assess potential diagnostic and treatment options. It does NOT include all information about conditions, treatments, medications, side effects, or risks that may apply to a specific patient. It is not intended to be medical advice or a substitute for the medical advice, diagnosis, or treatment of a health care provider based on the health care provider's examination and assessment of a patient's specific and unique circumstances. Patients must speak with a health care provider for complete information about their health, medical questions, and treatment options, including any risks or benefits regarding use of medications. This information does not endorse any treatments or medications as safe, effective, or approved for treating a specific patient. UpToDate, Inc. and its affiliates disclaim any warranty or liability relating to this information or the use thereof.The use of this information is governed by the Terms of Use, available at https://www.woltersHeadplayuwer.com/en/know/clinical-effectiveness-terms. 2024© UpToDate, Inc. and its affiliates and/or licensors. All rights reserved.  Copyright   © 2024 UpToDate, Inc. and/or its affiliates. All rights reserved.

## 2025-07-16 NOTE — ASSESSMENT & PLAN NOTE
Xanax makes her too sleepy to take on a regular basis  She will continue this as a prn medication  She has been on Atarax, Buspar, and zoloft  She was on Trintellix but it made things worse   Will start her on  Wellbutrin   And monitor her       Orders:    Drug Screen Routine w /Conf and Adulteration, urine.; Future    buPROPion (WELLBUTRIN) 100 mg tablet; Take 1 tablet (100 mg total) by mouth 2 (two) times a day

## 2025-07-16 NOTE — PROGRESS NOTES
Adult Annual Physical  Name: Denisse Navarro      : 2000      MRN: [id number]  Encounter Provider: YESSENIA Bradley  Encounter Date: 2025   Encounter department: [facility]    :  Assessment & Plan  Screening for STDs (sexually transmitted diseases)    Orders:    Chlamydia/GC amplified DNA by PCR; Future    Encounter for immunization         Chronic migraine with aura and with status migrainosus, not intractable         Moderate persistent asthma without complication         Gastroesophageal reflux disease without esophagitis         Nausea and vomiting, unspecified vomiting type         Anxiety and depression  Xanax makes her too sleepy to take on a regular basis  She will continue this as a prn medication  She has been on Atarax, Buspar, and zoloft  She was on Trintellix but it made things worse   Will start her on  Wellbutrin   And monitor her       Orders:    Drug Screen Routine w /Conf and Adulteration, urine.; Future    buPROPion (WELLBUTRIN) 100 mg tablet; Take 1 tablet (100 mg total) by mouth 2 (two) times a day    Annual physical exam    Orders:    CBC and differential; Future    Comprehensive metabolic panel; Future    Class 1 obesity due to excess calories with serious comorbidity and body mass index (BMI) of 33.0 to 33.9 in adult    Orders:    buPROPion (WELLBUTRIN) 100 mg tablet; Take 1 tablet (100 mg total) by mouth 2 (two) times a day    Vitamin D deficiency  Component  Ref Range & Units (hover) 25 11:15 AM   Vit D, 25-Hydroxy 18.8 Low      Vitamin D   Your Vitamin D level should be close to 100.  Helps with:  Decreasing Fatigue - Improves Energy  Fights Depression  Decreases Anxiety  Is a Neurotransporter Increasing Serotonin Levels  Improves Bone Health and Helps Prevent Osteoporosis  Is an Essential Nutrient  You Can ONLY Absorb the Proper Amount of Calcium When You Have the Correct Vitamin D Level  Decreases Wrinkles and Fine Lines by working  with Collagen Production  Improves Immune Health  Muscle Strength  Brain Cells - To Decrease Brain Fog  Decreases Inflammation  Improved Glucose Metabolism - Helping With Weight Loss  Hair Follicle Health and Growth  Balances Melatonin Levels to Improve Sleep Patterns     START  Vitamin D2 50,000 units weekly.  Vitamin D3 5000 unit capsules daily.    Orders:    Vitamin D 25 hydroxy; Future    ergocalciferol (VITAMIN D2) 50,000 units; Take 1 capsule (50,000 Units total) by mouth once a week    Cholecalciferol (Vitamin D3) 125 MCG (5000 UT) CAPS; Take 1 capsule (5,000 Units total) by mouth in the morning    Impaired fasting glucose    Orders:    Hemoglobin A1C; Future    On long term drug therapy    Orders:    Hemoglobin A1C; Future    Lipid Panel with Direct LDL reflex; Future    TSH, 3rd generation with Free T4 reflex; Future    Vitamin D 25 hydroxy; Future    Pure hypercholesterolemia    Orders:    Lipid Panel with Direct LDL reflex; Future    Abnormal results of thyroid function studies    Orders:    TSH, 3rd generation with Free T4 reflex; Future    Disorders of magnesium metabolism, unspecified    Orders:    Magnesium; Future    Chronic use of benzodiazepine for therapeutic purpose    Orders:    Drug Screen Routine w /Conf and Adulteration, urine.; Future    Encounter for therapeutic drug level monitoring    Orders:    Drug Screen Routine w /Conf and Adulteration, urine.; Future    Chronic allergic rhinitis  Patient started allergy shots a few months ago  She does not think they are working  She is still significantly stuffed up        Abdominal cramping    Orders:    US abdomen complete; Future    US transvaginal only; Future    Pelvic pain  Around the same time as her menstrual cycle  Orders:    US abdomen complete; Future    US transvaginal only; Future      Preventive Screenings:  - Diabetes Screening: screening up-to-date  - Cholesterol Screening: screening not indicated and has hyperlipidemia   - Hepatitis  C screening: screening up-to-date   - HIV screening: screening up-to-date   - Cervical cancer screening: screening up-to-date   - Lung cancer screening: screening not indicated     Immunizations:  - Immunizations due: HPV (Gardasil 9)    Counseling/Anticipatory Guidance:  - Alcohol: discussed moderation in alcohol intake and recommendations for healthy alcohol use.   - Drug use: discussed harms of illicit drug use and how it can negatively impact mental/physical health.   - Tobacco use: discussed harms of tobacco use and management options for quitting.   - Dental health: discussed importance of regular tooth brushing, flossing, and dental visits.   - Sexual health: discussed sexually transmitted diseases, partner selection, use of condoms, avoidance of unintended pregnancy, and contraceptive alternatives.   - Diet: discussed recommendations for a healthy/well-balanced diet.   - Exercise: the importance of regular exercise/physical activity was discussed. Recommend exercise 3-5 times per week for at least 30 minutes.   - Injury prevention: discussed safety/seat belts, safety helmets, smoke detectors, carbon monoxide detectors, and smoking near bedding or upholstery.          History of Present Illness     Adult Annual Physical:  Patient presents for annual physical.     Diet and Physical Activity:  - Diet/Nutrition: well balanced diet and no special diet.  - Exercise: walking.    General Health:  - Sleep: 4-6 hours of sleep on average.  - Hearing: normal hearing bilateral ears.  - Vision: no vision problems.  - Dental: regular dental visits.    /GYN Health:  - Follows with GYN: yes.   - History of STDs: no    Advanced Care Planning:  - Has an advanced directive?: no    - Has a durable medical POA?: no    - ACP document given to patient?: no      Review of Systems   Constitutional:  Negative for activity change, chills, fatigue and fever.   HENT:  Positive for sinus pressure and sinus pain. Negative for rhinorrhea  "and sore throat.    Eyes:  Negative for pain.   Respiratory:  Negative for cough and shortness of breath.    Cardiovascular:  Negative for chest pain, palpitations and leg swelling.   Gastrointestinal:  Negative for abdominal pain, constipation, diarrhea, nausea and vomiting.   Genitourinary:  Negative for difficulty urinating, flank pain, frequency and urgency.   Musculoskeletal:  Negative for gait problem, joint swelling and myalgias.   Skin:  Negative for color change.   Neurological:  Negative for dizziness, weakness, light-headedness and headaches.   Psychiatric/Behavioral:  Negative for sleep disturbance. The patient is nervous/anxious.    All other systems reviewed and are negative.    Medications Ordered Prior to Encounter[1]     Objective   /62 (Patient Position: Sitting, Cuff Size: Standard)   Pulse 88   Temp 98.2 °F (36.8 °C) (Tympanic)   Resp 18   Ht 5' 10\" (1.778 m)   Wt 108 kg (238 lb 12.8 oz)   SpO2 97%   BMI 34.26 kg/m²     Physical Exam  Vitals and nursing note reviewed.   Constitutional:       General: She is awake. She is not in acute distress.     Appearance: Normal appearance. She is well-developed.   HENT:      Head: Normocephalic and atraumatic.      Comments: Chronic sinusitis      Nose: Nose normal.      Mouth/Throat:      Mouth: Mucous membranes are moist.     Eyes:      Conjunctiva/sclera: Conjunctivae normal.       Cardiovascular:      Rate and Rhythm: Normal rate and regular rhythm.      Pulses: Normal pulses.      Heart sounds: Normal heart sounds. No murmur heard.  Pulmonary:      Effort: Pulmonary effort is normal. No respiratory distress.      Breath sounds: Normal breath sounds.   Abdominal:      General: Bowel sounds are normal.      Palpations: Abdomen is soft.      Tenderness: There is generalized abdominal tenderness.     Musculoskeletal:      Cervical back: Neck supple.      Right lower leg: No edema.      Left lower leg: No edema.     Skin:     General: Skin is " warm and dry.     Neurological:      Mental Status: She is alert and oriented to person, place, and time.     Psychiatric:         Attention and Perception: Attention normal.         Mood and Affect: Mood is anxious.         Speech: Speech normal.         Behavior: Behavior normal. Behavior is cooperative.         Thought Content: Thought content normal.         Cognition and Memory: Cognition normal.         Judgment: Judgment normal.       Administrative Statements   I have spent a total time of 30 minutes in caring for this patient on the day of the visit/encounter including Diagnostic results, Prognosis, Risks and benefits of tx options, Instructions for management, Patient and family education, Importance of tx compliance, Risk factor reductions, Impressions, Counseling / Coordination of care, Documenting in the medical record, Reviewing/placing orders in the medical record (including tests, medications, and/or procedures), and Obtaining or reviewing history  .         [1]   Current Outpatient Medications on File Prior to Visit   Medication Sig Dispense Refill    albuterol (2.5 mg/3 mL) 0.083 % nebulizer solution Take 3 mL (2.5 mg total) by nebulization every 6 (six) hours as needed for wheezing or shortness of breath 360 mL 1    albuterol (PROVENTIL HFA,VENTOLIN HFA) 90 mcg/act inhaler INHALE 2 PUFFS BY MOUTH EVERY 6 HOURS AS NEEDED FOR WHEEZING 8.5 g 0    Albuterol-Budesonide 90-80 MCG/ACT AERO Inhale 2 puffs every 6 (six) hours 10.7 g 5    ALPRAZolam (XANAX) 0.5 mg tablet Take 1 tablet (0.5 mg total) by mouth 2 (two) times a day as needed for anxiety 60 tablet 0    ascorbic acid (VITAMIN C) 500 MG tablet Take 500 mg by mouth      Diclofenac Sodium (VOLTAREN) 1 % Apply 2 g topically 4 (four) times a day 20 g 0    fluticasone (FLONASE) 50 mcg/act nasal spray 1 spray into each nostril daily 15.8 mL 5    Multiple Vitamin (Multi-Day) TABS Take 1 tablet by mouth      ondansetron (ZOFRAN) 4 mg tablet Take 1 tablet  (4 mg total) by mouth every 6 (six) hours 20 tablet 0    potassium citrate (UROCIT-K) 10 mEq Take 1 tablet (10 mEq total) by mouth 3 (three) times a day with meals 90 tablet 5    Probiotic Product (Misc Intestinal Vikki Regulat) CAPS Take by mouth      sodium chloride (OCEAN) 0.65 % nasal spray 1 spray into each nostril as needed      Ubrogepant (UBRELVY) 100 MG tablet Take 1 tablet (100 mg) one time as needed for migraine. May repeat one additional tablet (100 mg) at least two hours after the first dose. Do not use more than two doses per day, or for more than eight days per month. 10 tablet 0     No current facility-administered medications on file prior to visit.

## 2025-07-17 ENCOUNTER — OFFICE VISIT (OUTPATIENT)
Dept: FAMILY MEDICINE CLINIC | Facility: CLINIC | Age: 25
End: 2025-07-17
Payer: COMMERCIAL

## 2025-07-17 VITALS
SYSTOLIC BLOOD PRESSURE: 114 MMHG | OXYGEN SATURATION: 97 % | WEIGHT: 238.8 LBS | RESPIRATION RATE: 18 BRPM | BODY MASS INDEX: 34.19 KG/M2 | TEMPERATURE: 98.2 F | DIASTOLIC BLOOD PRESSURE: 62 MMHG | HEIGHT: 70 IN | HEART RATE: 88 BPM

## 2025-07-17 DIAGNOSIS — E78.00 PURE HYPERCHOLESTEROLEMIA: ICD-10-CM

## 2025-07-17 DIAGNOSIS — G43.E01 CHRONIC MIGRAINE WITH AURA AND WITH STATUS MIGRAINOSUS, NOT INTRACTABLE: Chronic | ICD-10-CM

## 2025-07-17 DIAGNOSIS — Z79.899 CHRONIC USE OF BENZODIAZEPINE FOR THERAPEUTIC PURPOSE: ICD-10-CM

## 2025-07-17 DIAGNOSIS — E66.09 CLASS 1 OBESITY DUE TO EXCESS CALORIES WITH SERIOUS COMORBIDITY AND BODY MASS INDEX (BMI) OF 33.0 TO 33.9 IN ADULT: ICD-10-CM

## 2025-07-17 DIAGNOSIS — Z11.3 SCREENING FOR STDS (SEXUALLY TRANSMITTED DISEASES): ICD-10-CM

## 2025-07-17 DIAGNOSIS — E83.40 DISORDERS OF MAGNESIUM METABOLISM, UNSPECIFIED: ICD-10-CM

## 2025-07-17 DIAGNOSIS — E66.811 CLASS 1 OBESITY DUE TO EXCESS CALORIES WITH SERIOUS COMORBIDITY AND BODY MASS INDEX (BMI) OF 33.0 TO 33.9 IN ADULT: ICD-10-CM

## 2025-07-17 DIAGNOSIS — R10.9 ABDOMINAL CRAMPING: Chronic | ICD-10-CM

## 2025-07-17 DIAGNOSIS — R73.01 IMPAIRED FASTING GLUCOSE: ICD-10-CM

## 2025-07-17 DIAGNOSIS — R94.6 ABNORMAL RESULTS OF THYROID FUNCTION STUDIES: ICD-10-CM

## 2025-07-17 DIAGNOSIS — F41.9 ANXIETY AND DEPRESSION: Chronic | ICD-10-CM

## 2025-07-17 DIAGNOSIS — Z23 ENCOUNTER FOR IMMUNIZATION: ICD-10-CM

## 2025-07-17 DIAGNOSIS — Z00.00 ANNUAL PHYSICAL EXAM: Primary | ICD-10-CM

## 2025-07-17 DIAGNOSIS — K21.9 GASTROESOPHAGEAL REFLUX DISEASE WITHOUT ESOPHAGITIS: Chronic | ICD-10-CM

## 2025-07-17 DIAGNOSIS — E55.9 VITAMIN D DEFICIENCY: Chronic | ICD-10-CM

## 2025-07-17 DIAGNOSIS — Z51.81 ENCOUNTER FOR THERAPEUTIC DRUG LEVEL MONITORING: ICD-10-CM

## 2025-07-17 DIAGNOSIS — Z79.899 ON LONG TERM DRUG THERAPY: ICD-10-CM

## 2025-07-17 DIAGNOSIS — R10.2 PELVIC PAIN: Chronic | ICD-10-CM

## 2025-07-17 DIAGNOSIS — J30.9 CHRONIC ALLERGIC RHINITIS: Chronic | ICD-10-CM

## 2025-07-17 DIAGNOSIS — R11.2 NAUSEA AND VOMITING, UNSPECIFIED VOMITING TYPE: Chronic | ICD-10-CM

## 2025-07-17 DIAGNOSIS — F32.A ANXIETY AND DEPRESSION: Chronic | ICD-10-CM

## 2025-07-17 DIAGNOSIS — J45.40 MODERATE PERSISTENT ASTHMA WITHOUT COMPLICATION: Chronic | ICD-10-CM

## 2025-07-17 PROCEDURE — 99395 PREV VISIT EST AGE 18-39: CPT | Performed by: NURSE PRACTITIONER

## 2025-07-17 RX ORDER — BUPROPION HYDROCHLORIDE 100 MG/1
100 TABLET ORAL 2 TIMES DAILY
Qty: 180 TABLET | Refills: 1 | Status: SHIPPED | OUTPATIENT
Start: 2025-07-17

## 2025-07-17 RX ORDER — ERGOCALCIFEROL 1.25 MG/1
50000 CAPSULE, LIQUID FILLED ORAL WEEKLY
Qty: 27 CAPSULE | Refills: 1 | Status: SHIPPED | OUTPATIENT
Start: 2025-07-17

## 2025-07-17 NOTE — ASSESSMENT & PLAN NOTE
Around the same time as her menstrual cycle  Orders:    US abdomen complete; Future    US transvaginal only; Future

## 2025-07-17 NOTE — ASSESSMENT & PLAN NOTE
Patient started allergy shots a few months ago  She does not think they are working  She is still significantly stuffed up

## 2025-07-24 ENCOUNTER — PATIENT MESSAGE (OUTPATIENT)
Dept: FAMILY MEDICINE CLINIC | Facility: CLINIC | Age: 25
End: 2025-07-24

## 2025-07-25 NOTE — PATIENT COMMUNICATION
Called Pt to advise we don't have her childhood immunizations & offered her titers but Pt states that she has her records & will fill them in and she will go to [facility] for the PPD placement/read.  She just needs the 2nd page filled out from her recent physical. Spoke to Mindy & she will fill out the paperwork & will have it scanned back for Pt.

## 2025-07-30 ENCOUNTER — TELEPHONE (OUTPATIENT)
Dept: FAMILY MEDICINE CLINIC | Facility: CLINIC | Age: 25
End: 2025-07-30

## 2025-08-06 DIAGNOSIS — J45.40 MODERATE PERSISTENT ASTHMA WITHOUT COMPLICATION: ICD-10-CM

## 2025-08-07 DIAGNOSIS — J45.40 MODERATE PERSISTENT ASTHMA WITHOUT COMPLICATION: ICD-10-CM

## 2025-08-07 RX ORDER — ALBUTEROL SULFATE 90 UG/1
2 INHALANT RESPIRATORY (INHALATION) EVERY 6 HOURS PRN
Qty: 8.5 G | Refills: 0 | OUTPATIENT
Start: 2025-08-07

## (undated) DEVICE — SPECIMEN CONTAINER STERILE PEEL PACK

## (undated) DEVICE — GUIDEWIRE STRGHT TIP 0.035 IN  SOLO PLUS

## (undated) DEVICE — PACK TUR

## (undated) DEVICE — FIBER STD QUANTA 272 MICRON

## (undated) DEVICE — 3M™ TEGADERM™ +PAD FILM DRESSING WITH NON-ADHERENT PAD, 3587, 3-1/2 IN X 4-1/8 IN (9 CM X 10.5 CM), 25/CAR, 4 CAR/CS: Brand: 3M™ TEGADERM™

## (undated) DEVICE — SYRINGE 10ML LL

## (undated) DEVICE — GLOVE SRG BIOGEL 7

## (undated) DEVICE — 3M™ TEGADERM™ TRANSPARENT FILM DRESSING FRAME STYLE, 1624W, 2-3/8 IN X 2-3/4 IN (6 CM X 7 CM), 100/CT 4CT/CASE: Brand: 3M™ TEGADERM™